# Patient Record
Sex: FEMALE | Race: WHITE | Employment: OTHER | URBAN - METROPOLITAN AREA
[De-identification: names, ages, dates, MRNs, and addresses within clinical notes are randomized per-mention and may not be internally consistent; named-entity substitution may affect disease eponyms.]

---

## 2017-10-20 ENCOUNTER — HOSPITAL ENCOUNTER (OUTPATIENT)
Dept: MAMMOGRAPHY | Age: 82
Discharge: HOME | End: 2017-10-20
Attending: SURGERY | Admitting: SURGERY

## 2017-10-20 DIAGNOSIS — Z12.31 ENCOUNTER FOR SCREENING MAMMOGRAM FOR BREAST CANCER: ICD-10-CM

## 2017-10-20 DIAGNOSIS — Z85.3 PERSONAL HISTORY OF MALIGNANT NEOPLASM OF BREAST: ICD-10-CM

## 2018-10-30 ENCOUNTER — HOSPITAL ENCOUNTER (OUTPATIENT)
Dept: PET IMAGING | Age: 83
Discharge: HOME | End: 2018-10-30
Payer: MEDICARE

## 2018-10-30 ENCOUNTER — HOSPITAL ENCOUNTER (OUTPATIENT)
Age: 83
Discharge: HOME | End: 2018-10-30
Payer: MEDICARE

## 2018-10-30 VITALS — HEIGHT: 62 IN | WEIGHT: 152 LBS | BODY MASS INDEX: 27.97 KG/M2

## 2018-10-30 DIAGNOSIS — C50.312 MALIGNANT NEOPLASM OF LOWER-INNER QUADRANT OF LEFT FEMALE BREAST, UNSPECIFIED ESTROGEN RECEPTOR STATUS (HCC): ICD-10-CM

## 2018-10-30 PROCEDURE — 3430000000 HC RX DIAGNOSTIC RADIOPHARMACEUTICAL: Performed by: INTERNAL MEDICINE

## 2018-10-30 PROCEDURE — 78815 PET IMAGE W/CT SKULL-THIGH: CPT

## 2018-10-30 PROCEDURE — A9552 F18 FDG: HCPCS | Performed by: INTERNAL MEDICINE

## 2018-10-30 RX ORDER — FLUDEOXYGLUCOSE F 18 200 MCI/ML
14.51 INJECTION, SOLUTION INTRAVENOUS
Status: COMPLETED | OUTPATIENT
Start: 2018-10-30 | End: 2018-10-30

## 2018-10-30 RX ADMIN — FLUDEOXYGLUCOSE F 18 14.51 MILLICURIE: 200 INJECTION, SOLUTION INTRAVENOUS at 08:10

## 2019-02-19 ENCOUNTER — HOSPITAL ENCOUNTER (OUTPATIENT)
Dept: PET IMAGING | Age: 84
Discharge: HOME | End: 2019-02-19
Payer: MEDICARE

## 2019-02-19 VITALS — WEIGHT: 150 LBS | HEIGHT: 62 IN | BODY MASS INDEX: 27.6 KG/M2

## 2019-02-19 DIAGNOSIS — C50.312 MALIGNANT NEOPLASM OF LOWER-INNER QUADRANT OF LEFT FEMALE BREAST, UNSPECIFIED ESTROGEN RECEPTOR STATUS (HCC): ICD-10-CM

## 2019-02-19 PROCEDURE — 3430000000 HC RX DIAGNOSTIC RADIOPHARMACEUTICAL: Performed by: INTERNAL MEDICINE

## 2019-02-19 PROCEDURE — A9552 F18 FDG: HCPCS | Performed by: INTERNAL MEDICINE

## 2019-02-19 PROCEDURE — 78815 PET IMAGE W/CT SKULL-THIGH: CPT

## 2019-02-19 RX ORDER — FLUDEOXYGLUCOSE F 18 200 MCI/ML
15.46 INJECTION, SOLUTION INTRAVENOUS
Status: COMPLETED | OUTPATIENT
Start: 2019-02-19 | End: 2019-02-19

## 2019-02-19 RX ADMIN — FLUDEOXYGLUCOSE F 18 15.46 MILLICURIE: 200 INJECTION, SOLUTION INTRAVENOUS at 10:04

## 2019-07-23 ENCOUNTER — HOSPITAL ENCOUNTER (OUTPATIENT)
Dept: PET IMAGING | Age: 84
Discharge: HOME | End: 2019-07-23
Payer: MEDICARE

## 2019-07-23 VITALS — WEIGHT: 147 LBS | HEIGHT: 62 IN | BODY MASS INDEX: 27.05 KG/M2

## 2019-07-23 DIAGNOSIS — C50.312 MALIGNANT NEOPLASM OF LOWER-INNER QUADRANT OF LEFT FEMALE BREAST, UNSPECIFIED ESTROGEN RECEPTOR STATUS (HCC): ICD-10-CM

## 2019-07-23 PROCEDURE — 3430000000 HC RX DIAGNOSTIC RADIOPHARMACEUTICAL: Performed by: INTERNAL MEDICINE

## 2019-07-23 PROCEDURE — 78815 PET IMAGE W/CT SKULL-THIGH: CPT

## 2019-07-23 PROCEDURE — A9552 F18 FDG: HCPCS | Performed by: INTERNAL MEDICINE

## 2019-07-23 RX ORDER — FLUDEOXYGLUCOSE F 18 200 MCI/ML
14.37 INJECTION, SOLUTION INTRAVENOUS
Status: COMPLETED | OUTPATIENT
Start: 2019-07-23 | End: 2019-07-23

## 2019-07-23 RX ADMIN — FLUDEOXYGLUCOSE F 18 14.37 MILLICURIE: 200 INJECTION, SOLUTION INTRAVENOUS at 10:01

## 2020-01-31 ENCOUNTER — HOSPITAL ENCOUNTER (OUTPATIENT)
Dept: PET IMAGING | Age: 85
Discharge: HOME | End: 2020-01-31
Payer: MEDICARE

## 2020-01-31 VITALS — WEIGHT: 152 LBS | BODY MASS INDEX: 27.97 KG/M2 | HEIGHT: 62 IN

## 2020-01-31 PROCEDURE — A9552 F18 FDG: HCPCS | Performed by: INTERNAL MEDICINE

## 2020-01-31 PROCEDURE — 78815 PET IMAGE W/CT SKULL-THIGH: CPT

## 2020-01-31 PROCEDURE — 3430000000 HC RX DIAGNOSTIC RADIOPHARMACEUTICAL: Performed by: INTERNAL MEDICINE

## 2020-01-31 RX ORDER — FLUDEOXYGLUCOSE F 18 200 MCI/ML
15.6 INJECTION, SOLUTION INTRAVENOUS
Status: COMPLETED | OUTPATIENT
Start: 2020-01-31 | End: 2020-01-31

## 2020-01-31 RX ADMIN — FLUDEOXYGLUCOSE F 18 15.6 MILLICURIE: 200 INJECTION, SOLUTION INTRAVENOUS at 08:45

## 2020-06-26 ENCOUNTER — HOSPITAL ENCOUNTER (OUTPATIENT)
Dept: PET IMAGING | Age: 85
Discharge: HOME | End: 2020-06-26
Payer: MEDICARE

## 2020-06-26 VITALS — BODY MASS INDEX: 28.52 KG/M2 | WEIGHT: 155 LBS | HEIGHT: 62 IN

## 2020-06-26 PROCEDURE — 3430000000 HC RX DIAGNOSTIC RADIOPHARMACEUTICAL: Performed by: INTERNAL MEDICINE

## 2020-06-26 PROCEDURE — 78815 PET IMAGE W/CT SKULL-THIGH: CPT

## 2020-06-26 PROCEDURE — A9552 F18 FDG: HCPCS | Performed by: INTERNAL MEDICINE

## 2020-06-26 RX ORDER — FLUDEOXYGLUCOSE F 18 200 MCI/ML
13.3 INJECTION, SOLUTION INTRAVENOUS
Status: COMPLETED | OUTPATIENT
Start: 2020-06-26 | End: 2020-06-26

## 2020-06-26 RX ADMIN — FLUDEOXYGLUCOSE F 18 13.3 MILLICURIE: 200 INJECTION, SOLUTION INTRAVENOUS at 10:57

## 2020-11-13 ENCOUNTER — HOSPITAL ENCOUNTER (OUTPATIENT)
Dept: PET IMAGING | Age: 85
Discharge: HOME | End: 2020-11-13
Payer: MEDICARE

## 2020-11-13 VITALS — BODY MASS INDEX: 22.45 KG/M2 | HEIGHT: 62 IN | WEIGHT: 122 LBS

## 2020-11-13 PROCEDURE — 3430000000 HC RX DIAGNOSTIC RADIOPHARMACEUTICAL: Performed by: INTERNAL MEDICINE

## 2020-11-13 PROCEDURE — A9552 F18 FDG: HCPCS | Performed by: INTERNAL MEDICINE

## 2020-11-13 PROCEDURE — 78815 PET IMAGE W/CT SKULL-THIGH: CPT

## 2020-11-13 RX ORDER — FLUDEOXYGLUCOSE F 18 200 MCI/ML
14.25 INJECTION, SOLUTION INTRAVENOUS
Status: COMPLETED | OUTPATIENT
Start: 2020-11-13 | End: 2020-11-13

## 2020-11-13 RX ADMIN — FLUDEOXYGLUCOSE F 18 14.25 MILLICURIE: 200 INJECTION, SOLUTION INTRAVENOUS at 11:52

## 2021-03-05 ENCOUNTER — HOSPITAL ENCOUNTER (OUTPATIENT)
Dept: PET IMAGING | Age: 86
Discharge: HOME | End: 2021-03-05
Payer: MEDICARE

## 2021-03-05 VITALS — HEIGHT: 62 IN | BODY MASS INDEX: 28.16 KG/M2 | WEIGHT: 153 LBS

## 2021-03-05 DIAGNOSIS — C50.312 MALIGNANT NEOPLASM OF LOWER-INNER QUADRANT OF LEFT FEMALE BREAST, UNSPECIFIED ESTROGEN RECEPTOR STATUS (HCC): ICD-10-CM

## 2021-03-05 PROCEDURE — 3430000000 HC RX DIAGNOSTIC RADIOPHARMACEUTICAL: Performed by: INTERNAL MEDICINE

## 2021-03-05 PROCEDURE — A9552 F18 FDG: HCPCS | Performed by: INTERNAL MEDICINE

## 2021-03-05 PROCEDURE — 78815 PET IMAGE W/CT SKULL-THIGH: CPT

## 2021-03-05 RX ORDER — FLUDEOXYGLUCOSE F 18 200 MCI/ML
12.77 INJECTION, SOLUTION INTRAVENOUS
Status: COMPLETED | OUTPATIENT
Start: 2021-03-05 | End: 2021-03-05

## 2021-03-05 RX ADMIN — FLUDEOXYGLUCOSE F 18 12.77 MILLICURIE: 200 INJECTION, SOLUTION INTRAVENOUS at 11:33

## 2021-05-13 ENCOUNTER — APPOINTMENT (OUTPATIENT)
Dept: GENERAL RADIOLOGY | Age: 86
DRG: 194 | End: 2021-05-13
Payer: MEDICARE

## 2021-05-13 ENCOUNTER — HOSPITAL ENCOUNTER (INPATIENT)
Age: 86
LOS: 2 days | Discharge: HOME | DRG: 194 | End: 2021-05-15
Attending: EMERGENCY MEDICINE | Admitting: HOSPITALIST
Payer: MEDICARE

## 2021-05-13 DIAGNOSIS — I50.9 ACUTE ON CHRONIC CONGESTIVE HEART FAILURE, UNSPECIFIED HEART FAILURE TYPE (HCC): Primary | ICD-10-CM

## 2021-05-13 DIAGNOSIS — J18.9 COMMUNITY ACQUIRED PNEUMONIA, UNSPECIFIED LATERALITY: ICD-10-CM

## 2021-05-13 LAB
A/G RATIO: 1.1 (ref 1.1–2.2)
ALBUMIN SERPL-MCNC: 3.7 G/DL (ref 3.4–5)
ALP BLD-CCNC: 96 U/L (ref 40–129)
ALT SERPL-CCNC: 18 U/L (ref 10–40)
ANION GAP SERPL CALCULATED.3IONS-SCNC: 13 MMOL/L (ref 3–16)
AST SERPL-CCNC: 29 U/L (ref 15–37)
BASOPHILS ABSOLUTE: 0.1 K/UL (ref 0–0.2)
BASOPHILS RELATIVE PERCENT: 2.8 %
BILIRUB SERPL-MCNC: 0.4 MG/DL (ref 0–1)
BILIRUBIN URINE: NEGATIVE
BLOOD, URINE: NEGATIVE
BUN BLDV-MCNC: 33 MG/DL (ref 7–20)
CALCIUM SERPL-MCNC: 8.8 MG/DL (ref 8.3–10.6)
CHLORIDE BLD-SCNC: 94 MMOL/L (ref 99–110)
CLARITY: CLEAR
CO2: 22 MMOL/L (ref 21–32)
COLOR: YELLOW
CREAT SERPL-MCNC: 1.9 MG/DL (ref 0.6–1.2)
EKG ATRIAL RATE: 68 BPM
EKG DIAGNOSIS: NORMAL
EKG P AXIS: 95 DEGREES
EKG P-R INTERVAL: 202 MS
EKG Q-T INTERVAL: 460 MS
EKG QRS DURATION: 102 MS
EKG QTC CALCULATION (BAZETT): 489 MS
EKG R AXIS: -46 DEGREES
EKG T AXIS: 75 DEGREES
EKG VENTRICULAR RATE: 68 BPM
EOSINOPHILS ABSOLUTE: 0.1 K/UL (ref 0–0.6)
EOSINOPHILS RELATIVE PERCENT: 2.6 %
GFR AFRICAN AMERICAN: 30
GFR NON-AFRICAN AMERICAN: 25
GLOBULIN: 3.3 G/DL
GLUCOSE BLD-MCNC: 110 MG/DL (ref 70–99)
GLUCOSE URINE: NEGATIVE MG/DL
HCT VFR BLD CALC: 29.5 % (ref 36–48)
HEMOGLOBIN: 10.2 G/DL (ref 12–16)
KETONES, URINE: NEGATIVE MG/DL
LEUKOCYTE ESTERASE, URINE: NEGATIVE
LIPASE: 31 U/L (ref 13–60)
LYMPHOCYTES ABSOLUTE: 0.6 K/UL (ref 1–5.1)
LYMPHOCYTES RELATIVE PERCENT: 25.8 %
MCH RBC QN AUTO: 31.7 PG (ref 26–34)
MCHC RBC AUTO-ENTMCNC: 34.4 G/DL (ref 31–36)
MCV RBC AUTO: 92.1 FL (ref 80–100)
MICROSCOPIC EXAMINATION: NORMAL
MONOCYTES ABSOLUTE: 0.2 K/UL (ref 0–1.3)
MONOCYTES RELATIVE PERCENT: 9.9 %
NEUTROPHILS ABSOLUTE: 1.5 K/UL (ref 1.7–7.7)
NEUTROPHILS RELATIVE PERCENT: 58.9 %
NITRITE, URINE: NEGATIVE
PDW BLD-RTO: 18.5 % (ref 12.4–15.4)
PH UA: 6 (ref 5–8)
PLATELET # BLD: 180 K/UL (ref 135–450)
PMV BLD AUTO: 7.3 FL (ref 5–10.5)
POTASSIUM SERPL-SCNC: 4.7 MMOL/L (ref 3.5–5.1)
PRO-BNP: 2199 PG/ML (ref 0–449)
PROCALCITONIN: 0.36 NG/ML (ref 0–0.15)
PROTEIN UA: NEGATIVE MG/DL
RBC # BLD: 3.21 M/UL (ref 4–5.2)
SARS-COV-2, NAAT: NOT DETECTED
SODIUM BLD-SCNC: 129 MMOL/L (ref 136–145)
SPECIFIC GRAVITY UA: 1.01 (ref 1–1.03)
TOTAL PROTEIN: 7 G/DL (ref 6.4–8.2)
TROPONIN: 0.04 NG/ML
TROPONIN: 0.04 NG/ML
TROPONIN: 0.05 NG/ML
URINE TYPE: NORMAL
UROBILINOGEN, URINE: 0.2 E.U./DL
WBC # BLD: 2.5 K/UL (ref 4–11)

## 2021-05-13 PROCEDURE — 85025 COMPLETE CBC W/AUTO DIFF WBC: CPT

## 2021-05-13 PROCEDURE — 71045 X-RAY EXAM CHEST 1 VIEW: CPT

## 2021-05-13 PROCEDURE — 2580000003 HC RX 258: Performed by: EMERGENCY MEDICINE

## 2021-05-13 PROCEDURE — 87635 SARS-COV-2 COVID-19 AMP PRB: CPT

## 2021-05-13 PROCEDURE — 2580000003 HC RX 258: Performed by: HOSPITALIST

## 2021-05-13 PROCEDURE — 99285 EMERGENCY DEPT VISIT HI MDM: CPT

## 2021-05-13 PROCEDURE — 93010 ELECTROCARDIOGRAM REPORT: CPT | Performed by: INTERNAL MEDICINE

## 2021-05-13 PROCEDURE — 84484 ASSAY OF TROPONIN QUANT: CPT

## 2021-05-13 PROCEDURE — 6360000002 HC RX W HCPCS: Performed by: EMERGENCY MEDICINE

## 2021-05-13 PROCEDURE — 80053 COMPREHEN METABOLIC PANEL: CPT

## 2021-05-13 PROCEDURE — 36415 COLL VENOUS BLD VENIPUNCTURE: CPT

## 2021-05-13 PROCEDURE — 93005 ELECTROCARDIOGRAM TRACING: CPT | Performed by: EMERGENCY MEDICINE

## 2021-05-13 PROCEDURE — 1200000000 HC SEMI PRIVATE

## 2021-05-13 PROCEDURE — 6370000000 HC RX 637 (ALT 250 FOR IP): Performed by: EMERGENCY MEDICINE

## 2021-05-13 PROCEDURE — 6370000000 HC RX 637 (ALT 250 FOR IP): Performed by: HOSPITALIST

## 2021-05-13 PROCEDURE — 83690 ASSAY OF LIPASE: CPT

## 2021-05-13 PROCEDURE — 87040 BLOOD CULTURE FOR BACTERIA: CPT

## 2021-05-13 PROCEDURE — 83880 ASSAY OF NATRIURETIC PEPTIDE: CPT

## 2021-05-13 PROCEDURE — 96374 THER/PROPH/DIAG INJ IV PUSH: CPT

## 2021-05-13 PROCEDURE — 84145 PROCALCITONIN (PCT): CPT

## 2021-05-13 PROCEDURE — 81003 URINALYSIS AUTO W/O SCOPE: CPT

## 2021-05-13 RX ORDER — ACETAMINOPHEN 325 MG/1
650 TABLET ORAL EVERY 6 HOURS PRN
Status: DISCONTINUED | OUTPATIENT
Start: 2021-05-13 | End: 2021-05-15 | Stop reason: HOSPADM

## 2021-05-13 RX ORDER — SODIUM CHLORIDE 9 MG/ML
INJECTION, SOLUTION INTRAVENOUS CONTINUOUS
Status: DISCONTINUED | OUTPATIENT
Start: 2021-05-13 | End: 2021-05-15 | Stop reason: HOSPADM

## 2021-05-13 RX ORDER — SODIUM CHLORIDE 0.9 % (FLUSH) 0.9 %
5-40 SYRINGE (ML) INJECTION PRN
Status: DISCONTINUED | OUTPATIENT
Start: 2021-05-13 | End: 2021-05-15 | Stop reason: HOSPADM

## 2021-05-13 RX ORDER — ACETAMINOPHEN 325 MG/1
650 TABLET ORAL ONCE
Status: COMPLETED | OUTPATIENT
Start: 2021-05-13 | End: 2021-05-13

## 2021-05-13 RX ORDER — LEVOTHYROXINE SODIUM 112 UG/1
112 TABLET ORAL DAILY
Status: DISCONTINUED | OUTPATIENT
Start: 2021-05-14 | End: 2021-05-15 | Stop reason: HOSPADM

## 2021-05-13 RX ORDER — CLOPIDOGREL BISULFATE 75 MG/1
75 TABLET ORAL DAILY
Status: ON HOLD | COMMUNITY
Start: 2020-09-28 | End: 2021-05-25 | Stop reason: HOSPADM

## 2021-05-13 RX ORDER — FUROSEMIDE 10 MG/ML
40 INJECTION INTRAMUSCULAR; INTRAVENOUS ONCE
Status: COMPLETED | OUTPATIENT
Start: 2021-05-13 | End: 2021-05-13

## 2021-05-13 RX ORDER — POLYETHYLENE GLYCOL 3350 17 G/17G
17 POWDER, FOR SOLUTION ORAL DAILY PRN
Status: DISCONTINUED | OUTPATIENT
Start: 2021-05-13 | End: 2021-05-15 | Stop reason: HOSPADM

## 2021-05-13 RX ORDER — FUROSEMIDE 20 MG/1
20 TABLET ORAL DAILY
COMMUNITY
Start: 2020-10-21

## 2021-05-13 RX ORDER — PROMETHAZINE HYDROCHLORIDE 25 MG/1
12.5 TABLET ORAL EVERY 6 HOURS PRN
Status: DISCONTINUED | OUTPATIENT
Start: 2021-05-13 | End: 2021-05-15 | Stop reason: HOSPADM

## 2021-05-13 RX ORDER — LISINOPRIL 20 MG/1
20 TABLET ORAL DAILY
Status: ON HOLD | COMMUNITY
Start: 2021-03-15 | End: 2021-05-15 | Stop reason: HOSPADM

## 2021-05-13 RX ORDER — PRAVASTATIN SODIUM 40 MG
40 TABLET ORAL NIGHTLY
Status: DISCONTINUED | OUTPATIENT
Start: 2021-05-13 | End: 2021-05-15 | Stop reason: HOSPADM

## 2021-05-13 RX ORDER — SODIUM CHLORIDE 0.9 % (FLUSH) 0.9 %
5-40 SYRINGE (ML) INJECTION EVERY 12 HOURS SCHEDULED
Status: DISCONTINUED | OUTPATIENT
Start: 2021-05-13 | End: 2021-05-15 | Stop reason: HOSPADM

## 2021-05-13 RX ORDER — ASPIRIN 325 MG
325 TABLET ORAL ONCE
Status: COMPLETED | OUTPATIENT
Start: 2021-05-13 | End: 2021-05-13

## 2021-05-13 RX ORDER — LORATADINE 10 MG/1
10 TABLET ORAL DAILY
Status: ON HOLD | COMMUNITY
End: 2021-05-14 | Stop reason: CLARIF

## 2021-05-13 RX ORDER — ONDANSETRON 2 MG/ML
4 INJECTION INTRAMUSCULAR; INTRAVENOUS EVERY 6 HOURS PRN
Status: DISCONTINUED | OUTPATIENT
Start: 2021-05-13 | End: 2021-05-15 | Stop reason: HOSPADM

## 2021-05-13 RX ORDER — CIPROFLOXACIN 250 MG/1
TABLET, FILM COATED ORAL
Status: ON HOLD | COMMUNITY
Start: 2021-05-10 | End: 2021-05-14 | Stop reason: CLARIF

## 2021-05-13 RX ORDER — DILTIAZEM HYDROCHLORIDE 300 MG/1
300 CAPSULE, EXTENDED RELEASE ORAL DAILY
COMMUNITY
Start: 2020-09-28

## 2021-05-13 RX ORDER — ROPINIROLE 0.25 MG/1
0.25 TABLET, FILM COATED ORAL NIGHTLY
COMMUNITY
Start: 2021-04-16

## 2021-05-13 RX ORDER — ASPIRIN 81 MG/1
81 TABLET ORAL DAILY
Status: DISCONTINUED | OUTPATIENT
Start: 2021-05-13 | End: 2021-05-15 | Stop reason: HOSPADM

## 2021-05-13 RX ORDER — SODIUM CHLORIDE 9 MG/ML
25 INJECTION, SOLUTION INTRAVENOUS PRN
Status: DISCONTINUED | OUTPATIENT
Start: 2021-05-13 | End: 2021-05-15 | Stop reason: HOSPADM

## 2021-05-13 RX ORDER — TERAZOSIN 2 MG/1
2 CAPSULE ORAL DAILY
COMMUNITY
Start: 2021-05-10

## 2021-05-13 RX ORDER — ACETAMINOPHEN 650 MG/1
650 SUPPOSITORY RECTAL EVERY 6 HOURS PRN
Status: DISCONTINUED | OUTPATIENT
Start: 2021-05-13 | End: 2021-05-15 | Stop reason: HOSPADM

## 2021-05-13 RX ADMIN — AZITHROMYCIN MONOHYDRATE 500 MG: 500 INJECTION, POWDER, LYOPHILIZED, FOR SOLUTION INTRAVENOUS at 14:13

## 2021-05-13 RX ADMIN — FUROSEMIDE 40 MG: 10 INJECTION, SOLUTION INTRAMUSCULAR; INTRAVENOUS at 12:40

## 2021-05-13 RX ADMIN — ASPIRIN 325 MG ORAL TABLET 325 MG: 325 PILL ORAL at 12:37

## 2021-05-13 RX ADMIN — CEFTRIAXONE SODIUM 1000 MG: 1 INJECTION, POWDER, FOR SOLUTION INTRAMUSCULAR; INTRAVENOUS at 13:24

## 2021-05-13 RX ADMIN — PRAVASTATIN SODIUM 40 MG: 40 TABLET ORAL at 19:54

## 2021-05-13 RX ADMIN — SODIUM CHLORIDE: 9 INJECTION, SOLUTION INTRAVENOUS at 15:53

## 2021-05-13 RX ADMIN — METOPROLOL TARTRATE 25 MG: 25 TABLET, FILM COATED ORAL at 19:53

## 2021-05-13 RX ADMIN — ACETAMINOPHEN 650 MG: 325 TABLET ORAL at 11:53

## 2021-05-13 RX ADMIN — ACETAMINOPHEN 650 MG: 325 TABLET ORAL at 19:53

## 2021-05-13 ASSESSMENT — PAIN DESCRIPTION - LOCATION: LOCATION: ABDOMEN

## 2021-05-13 ASSESSMENT — PAIN SCALES - GENERAL
PAINLEVEL_OUTOF10: 5
PAINLEVEL_OUTOF10: 5

## 2021-05-13 ASSESSMENT — PAIN DESCRIPTION - PAIN TYPE: TYPE: ACUTE PAIN

## 2021-05-13 ASSESSMENT — PAIN DESCRIPTION - ORIENTATION
ORIENTATION: LEFT;UPPER
ORIENTATION: MID

## 2021-05-13 NOTE — PROGRESS NOTES
PS sent to MD:        RE: Alexander Stanton 01/21/1926 RM: 215 Patient takes Ibrance or (palbociclib) 75mg for her cancer treatment. Patient brought home supply. Can you place order for patient so pharmacy can dose it? Thank you. Need Callback: NO CALLBACK REQ A2 HEART FAILURE ROUTINE    Addendum: Hold for now due to infection. Medication placed in patient's lock box.

## 2021-05-13 NOTE — PROGRESS NOTES
Patient arrived to unit via stretcher in stable condition with all belongings. VSS. Admission assessment completed as charted. Patient oriented to room, call light, bed, phone, lights, bathroom, and nurse. Patient educated on the daily schedule including vitals, lab draws, assessments, possible tests, schedule of MD rounding, daily weights and I's & O's. Patient instructed on current diet, how to use 8MENU, and TV. Patient verbalizes pain as 0 on 0-10 scale. Patient denies any other needs at this time. Bed is in lowest locked position, call light is within reach, and side rails up 2/4. Will continue to monitor patient for pain, discomfort or any other needs.

## 2021-05-13 NOTE — ED NOTES
Report given to Neris Wall, floor nurse.  Patient left unit with all belongings including but not limited to; shoes, clothing       Jamaica León RN  05/13/21 2894

## 2021-05-13 NOTE — H&P
Hospital Medicine History & Physical      PCP: Jono Vasques MD    Date of Admission: 5/13/2021    Date of Service: Pt seen/examined on 5/13/21 and Admitted to Inpatient with expected LOS greater than two midnights due to medical therapy. Chief Complaint: Pain underneath her lower rib cage on both sides      History Of Present Illness:     80 y.o. femalewith history of recurrent stage III left breast cancer on chemotherapy, hypertension, CKD stage III, hypothyroidism, dementia presented to emergency room with complaints of pain underneath her lower rib cage and radiating to the back for the past 1 day. . She also had a small dry cough a few days ago with shortness of breath and subjective fevers. .. No URI symptoms, nausea vomiting or diarrhea. .  She is fully vaccinated for Covid-19. .  In the emergency room, she was afebrile with no leukocytosis. . Chest x-ray showed bibasilar infiltrates/atelectasis. . Recent leukopenia of 2.4k, troponin 0 0.05. Bettey Grad EKG showed no acute ischemic changes. rapid covid test was neg  She was given IV Rocephin and azithromycin for presumed pneumonia    Past Medical History:          Diagnosis Date    Cancer [facility])     breast    Cataract     High blood pressure     Thyroid disorder        Past Surgical History:          Procedure Laterality Date    BLADDER SUSPENSION  2000    BREAST LUMPECTOMY Left 08/21/15    CATARACT REMOVAL      CHOLECYSTECTOMY      HYSTERECTOMY         Medications Prior to Admission:      Prior to Admission medications    Medication Sig Start Date End Date Taking?  Authorizing Provider   clopidogrel (PLAVIX) 75 MG tablet Take 75 mg by mouth daily 9/28/20  Yes Historical Provider, MD   dilTIAZem (TIAZAC) 300 MG extended release capsule Take 300 mg by mouth daily 9/28/20  Yes Historical Provider, MD   furosemide (LASIX) 20 MG tablet Take 20 mg by mouth daily 10/21/20  Yes Historical Provider, MD   lisinopril (PRINIVIL;ZESTRIL) 20 MG tablet 40 mg  3/15/21 Resp 18   Ht 5' 2\" (1.575 m)   Wt 153 lb 12.8 oz (69.8 kg)   SpO2 97%   BMI 28.13 kg/m²     General appearance:  No apparent distress, appears stated age and cooperative. HEENT:  Normal cephalic, atraumatic without obvious deformity. Pupils equal, round, and reactive to light. Extra ocular muscles intact. Conjunctivae/corneas clear. Neck: Supple, with full range of motion. No jugular venous distention. Trachea midline. Respiratory:  Normal respiratory effort. Clear to auscultation, bilaterally without Rales/Wheezes/Rhonchi. Cardiovascular:  Regular rate and rhythm with normal S1/S2 without murmurs, rubs or gallops. Abdomen: Soft, non-tender, non-distended with normal bowel sounds. Musculoskeletal:  No clubbing, cyanosis or edema bilaterally. Full range of motion without deformity. Skin: Skin color, texture, turgor normal.  No rashes or lesions. Neurologic:  Neurovascularly intact without any focal sensory/motor deficits. Cranial nerves: II-XII intact, grossly non-focal.  Psychiatric:  Alert and oriented, thought content appropriate, normal insight  Capillary Refill: Brisk,< 3 seconds   Peripheral Pulses: +2 palpable, equal bilaterally       CXR:  I have reviewed the CXR with the following interpretation:   EKG:  I have reviewed the EKG with the following interpretation:     Labs:     Recent Labs     05/13/21  1135   WBC 2.5*   HGB 10.2*   HCT 29.5*        Recent Labs     05/13/21  1135   *   K 4.7   CL 94*   CO2 22   BUN 33*   CREATININE 1.9*   CALCIUM 8.8     Recent Labs     05/13/21  1135   AST 29   ALT 18   BILITOT 0.4   ALKPHOS 96     No results for input(s): INR in the last 72 hours.   Recent Labs     05/13/21  1135   TROPONINI 0.05*       Urinalysis:      Lab Results   Component Value Date    NITRU Negative 05/13/2021    BLOODU Negative 05/13/2021    SPECGRAV 1.015 05/13/2021    GLUCOSEU Negative 05/13/2021         ASSESSMENT:      -Bacterial pneumonia/CAP--bibasilar infiltrates on chest x-ray, mildly elevated procalcitonin, chest pain with mild cough , SLB and subjective fevers. .rapid covid is neg. .. Continue IV Rocephin and azithromycin. . Follow-up on culture data    - JIMMIE on CKD stage III--hold diuretic and lisinopril therapy for now. start IV fluids cautiously. closely monitor renal function avoid nephrotoxic medications . Florentin Dupont Creatinine is 1.9. Florentin Dupont Last creatinine was 1.74 in care everywhere 4/19/21-unclear what the baseline is    -Hyponatremiahypovolemic,Na 129hold diuretics and start IV fluids--closely monitor sodium levels    -Mild troponin elevation in the setting of JIMMIE-doubt ACS--will cycle troponins , obtain echocardiogram      -Stage III left breast cancer, recurrent--on palbociclib 75 mg and monthly fulvestrant and trastuzumab    Follows with oncologist at [facility]    -Chemotherapy-induced anemia and neutropenia--ANC greater than 1000-similar to outpatient lab--follows with oncology--continue to monitor    -Alzheimer's dementia--mild-continue supportive care      -Recent UTI--completing 7-day course of Cipro today    -Essential hypertensionstablecontinue metoprolol. Hold HCTZ    -Osteoporosison Fosamax    -Hypothyroidismcontinue Synthroid    -Hyperlipidemiacontinue statin        DVT Prophylaxis: Lovenox  Diet: DIET CARDIAC;  Code Status: DNR-CC           Humberto Chapa MD    Thank you Narda Litten, MD for the opportunity to be involved in this patient's care. If you have any questions or concerns please feel free to contact me at [phone].

## 2021-05-13 NOTE — ED NOTES
Bed: 21  Expected date:   Expected time:   Means of arrival:   Comments:  Cyn Carter RN  05/13/21 1124

## 2021-05-13 NOTE — PROGRESS NOTES
4 Eyes Skin Assessment     The patient is being assess for  Admission    I agree that 2 RN's have performed a thorough Head to Toe Skin Assessment on the patient. ALL assessment sites listed below have been assessed. Areas assessed by both nurses: America/Lidia  [x]   Head, Face, and Ears   [x]   Shoulders, Back, and Chest  [x]   Arms, Elbows, and Hands   [x]   Coccyx, Sacrum, and Ischum  [x]   Legs, Feet, and Heels        Does the Patient have Skin Breakdown?   No         Herbie Prevention initiated:  No   Wound Care Orders initiated:  No      [facility] nurse consulted for Pressure Injury (Stage 3,4, Unstageable, DTI, NWPT, and Complex wounds):  No      Nurse 1 eSignature: Electronically signed by Beth Echevarria RN on 5/13/21 at 3:30 PM EDT    **SHARE this note so that the co-signing nurse is able to place an eSignature**    Nurse 2 eSignature: Electronically signed by Hien Peace RN on 5/13/21 at 4:04 PM EDT

## 2021-05-13 NOTE — ED NOTES
Patient identified as a positive fall risk on the ED triage fall screening. Patient placed in fall precautions which includes:  yellow fall risk bracelet on wrist, nonskid shoes on feet, \"Be Safe\" sign placed on patient's door, and bed alarm placed under patient/alarm turned on. Patient instructed on importance of not getting out of bed or ambulating without assistance for safety.           Franky De Oliveira RN  05/13/21 1348

## 2021-05-13 NOTE — ED PROVIDER NOTES
7 days    ASPIRIN 81 MG TABLET    Take 81 mg by mouth daily    B COMPLEX VITAMINS (VITAMIN B COMPLEX PO)    Take  by mouth. CALCIUM CARBONATE (CALTRATE 600 PO)    Take  by mouth. CHOLECALCIFEROL (VITAMIN D PO)    Take  by mouth. HYDROCHLOROTHIAZIDE PO    Take by mouth    LEVOTHYROXINE (SYNTHROID) 88 MCG TABLET        METOPROLOL (LOPRESSOR) 25 MG TABLET        PRAVASTATIN (PRAVACHOL) 40 MG TABLET        TRAMADOL (ULTRAM) 50 MG TABLET           ALLERGIES     Codeine    FAMILY HISTORY       Family History   Problem Relation Age of Onset    Heart Disease Mother     High Blood Pressure Mother     High Blood Pressure Father     Stroke Father     Rheum Arthritis Sister     Cancer Sister           SOCIAL HISTORY       Social History     Socioeconomic History    Marital status:       Spouse name: None    Number of children: None    Years of education: None    Highest education level: None   Occupational History    None   Social Needs    Financial resource strain: None    Food insecurity     Worry: None     Inability: None    Transportation needs     Medical: None     Non-medical: None   Tobacco Use    Smoking status: Former Smoker     Quit date: 1989     Years since quittin.3    Smokeless tobacco: Never Used   Substance and Sexual Activity    Alcohol use: No     Alcohol/week: 0.0 standard drinks     Comment: 1 beer every other week    Drug use: No    Sexual activity: None   Lifestyle    Physical activity     Days per week: None     Minutes per session: None    Stress: None   Relationships    Social connections     Talks on phone: None     Gets together: None     Attends Yarsanism service: None     Active member of club or organization: None     Attends meetings of clubs or organizations: None     Relationship status: None    Intimate partner violence     Fear of current or ex partner: None     Emotionally abused: None     Physically abused: None     Forced sexual activity: None Other Topics Concern    None   Social History Narrative    None       SCREENINGS    Crystal Coma Scale  Eye Opening: Spontaneous  Best Verbal Response: Oriented  Best Motor Response: Obeys commands  Crystal Coma Scale Score: 15          PHYSICAL EXAM    (up to 7 for level 4, 8 or more for level 5)     ED Triage Vitals [05/13/21 1128]   BP Temp Temp Source Pulse Resp SpO2 Height Weight   (!) 174/59 97.8 °F (36.6 °C) Oral 64 18 94 % -- 153 lb (69.4 kg)       Physical Exam    General appearance:  Cooperative. No acute distress. Skin:  Warm. Dry. Eye:  Extraocular movements intact. Ears, nose, mouth and throat:  Oral mucosa moist,  Neck:  Trachea midline. Heart:  Regular rate and rhythm  Perfusion:  intact  Respiratory:  Lungs clear to auscultation bilaterally. Respirations nonlabored. Abdominal:   Non distended. Nontender  Neurological:  Alert and oriented x 3. Moves all extremities spontaneously  Musculoskeletal:   Normal ROM, no deformities.   Bilateral lower extremity edema          Psychiatric:  Normal mood      DIAGNOSTIC RESULTS       Labs Reviewed   CBC WITH AUTO DIFFERENTIAL - Abnormal; Notable for the following components:       Result Value    WBC 2.5 (*)     RBC 3.21 (*)     Hemoglobin 10.2 (*)     Hematocrit 29.5 (*)     RDW 18.5 (*)     Neutrophils Absolute 1.5 (*)     Lymphocytes Absolute 0.6 (*)     All other components within normal limits    Narrative:     Performed at:  [address]   Phone [phone]   COMPREHENSIVE METABOLIC PANEL - Abnormal; Notable for the following components:    Sodium 129 (*)     Chloride 94 (*)     Glucose 110 (*)     BUN 33 (*)     CREATININE 1.9 (*)     GFR Non- 25 (*)     GFR  30 (*)     All other components within normal limits    Narrative:     Performed at:  [facility]) - [address] Phone [phone] - Abnormal; Notable for the following components:    Pro-BNP 2,199 (*)     All other components within normal limits    Narrative:     Performed at:  [address], [facility] "[employer]"   Phone [phone]   TROPONIN - Abnormal; Notable for the following components:    Troponin 0.05 (*)     All other components within normal limits    Narrative:     Performed at:  [address], [facility] "[employer]"   Phone [phone], RAPID    Narrative:     Performed at:  [address], [facility] "[employer]"   Phone [phone]   CULTURE, BLOOD 1   CULTURE, BLOOD 2   URINALYSIS    Narrative:     Performed at:  [address], [facility] "[employer]"   Phone [phone]   LIPASE    Narrative:     Performed at:  [address], [facility] "[employer]"   Phone [phone]   PROCALCITONIN       Interpretation per the Radiologist below, if obtained/available at the time of this note:    XR CHEST PORTABLE   Final Result   Cardiomegaly with mild vascular congestion. Patchy asymmetric airspace disease right lung base could represent   superimposed atelectasis or pneumonia. All other labs/imaging were within normal range or not returned as of this dictation. EMERGENCY DEPARTMENT COURSE and DIFFERENTIAL DIAGNOSIS/MDM:   Vitals:    Vitals:    05/13/21 1128 05/13/21 1241 05/13/21 1341   BP: (!) 174/59 (!) 160/81 (!) 159/65   Pulse: 64 66 69   Resp: 18 20 21   Temp: 97.8 °F (36.6 °C)     TempSrc: Oral     SpO2: 94% 98% 96%   Weight: 153 lb (69.4 kg)         EKG: Sinus rhythm rate of 68 bpm.  Left anterior fascicular block. No ST elevation. Compared to prior from 8/17/2015 no change.     Patient presents emergency department today complaining of epigastric tightness. No tenderness on abdominal exam whatsoever. More concerning for underlying cardiac source. Found to have elevated troponin and elevated BNP. Some peripheral edema noted on exam.  Concern for possible CHF as she is hypertensive here as well. Given aspirin and Lasix. Chest x-ray concerning for possible infiltrates and the patient states she has had a cough and some fever. Given Rocephin and azithromycin for possible community-acquired pneumonia and will be admitted to the hospital.  COVID-19 test sent and negative    MDM    CONSULTS     IP CONSULT TO HOSPITALIST    Critical Care:   None    REASSESSMENT          PROCEDURE     Unless otherwise noted below, none     Procedures      FINAL IMPRESSION      1. Acute on chronic congestive heart failure, unspecified heart failure type ([facility] [address]    2. Community acquired pneumonia, unspecified laterality            DISPOSITION/PLAN   DISPOSITION Admitted 05/13/2021 01:14:54 PM        PATIENT REFERRED TO:  No follow-up provider specified. DISCHARGE MEDICATIONS:  New Prescriptions    No medications on file     Controlled Substances Monitoring:     RX Monitoring 8/25/2015   Attestation The Prescription Monitoring Report for this patient was reviewed today.        (Please note that portions of this note were completed with a voice recognition program.  Efforts were made to edit the dictations but occasionally words are mis-transcribed.)    Epi Vallejo MD (electronically signed)  Attending Emergency Physician            Joseph Barclay MD  05/13/21 1400

## 2021-05-13 NOTE — PLAN OF CARE
Problem: Falls - Risk of:  Goal: Will remain free from falls  Description: Will remain free from falls  Outcome: Ongoing  Note: Patient will remain free of falls. Patient calls out appropriately. Patient utilizes call light, bed is in lowest locked position, nonskid footwear in place. Patient's room is kept free of clutter and patient is assisted with ambulation as needed. Will continue to monitor and educate patient on safety precautions. Problem: Pain:  Goal: Pain level will decrease  Description: Pain level will decrease  Outcome: Ongoing  Note: Patient will remain free of pain prior to discharge. Patient will utilize 0-10 scale to describe pain as well as onset, location, and duration. Patient will utilize nonpharmacologic and pharmacolgoic pain relief methods as educated by RN. PRN pain medications will be administered per orders as needed. Will continue to monitor patient.

## 2021-05-14 LAB
ANION GAP SERPL CALCULATED.3IONS-SCNC: 8 MMOL/L (ref 3–16)
BASOPHILS ABSOLUTE: 0 K/UL (ref 0–0.2)
BASOPHILS RELATIVE PERCENT: 2 %
BUN BLDV-MCNC: 30 MG/DL (ref 7–20)
BURR CELLS: ABNORMAL
CALCIUM SERPL-MCNC: 8.3 MG/DL (ref 8.3–10.6)
CHLORIDE BLD-SCNC: 98 MMOL/L (ref 99–110)
CO2: 25 MMOL/L (ref 21–32)
CREAT SERPL-MCNC: 1.7 MG/DL (ref 0.6–1.2)
EOSINOPHILS ABSOLUTE: 0.1 K/UL (ref 0–0.6)
EOSINOPHILS RELATIVE PERCENT: 3 %
GFR AFRICAN AMERICAN: 34
GFR NON-AFRICAN AMERICAN: 28
GLUCOSE BLD-MCNC: 83 MG/DL (ref 70–99)
HCT VFR BLD CALC: 26.5 % (ref 36–48)
HEMOGLOBIN: 9.1 G/DL (ref 12–16)
LYMPHOCYTES ABSOLUTE: 0.5 K/UL (ref 1–5.1)
LYMPHOCYTES RELATIVE PERCENT: 22 %
MACROCYTES: ABNORMAL
MCH RBC QN AUTO: 31.5 PG (ref 26–34)
MCHC RBC AUTO-ENTMCNC: 34.2 G/DL (ref 31–36)
MCV RBC AUTO: 92 FL (ref 80–100)
MONOCYTES ABSOLUTE: 0.1 K/UL (ref 0–1.3)
MONOCYTES RELATIVE PERCENT: 4 %
NEUTROPHILS ABSOLUTE: 1.7 K/UL (ref 1.7–7.7)
NEUTROPHILS RELATIVE PERCENT: 69 %
OVALOCYTES: ABNORMAL
PDW BLD-RTO: 18.9 % (ref 12.4–15.4)
PLATELET # BLD: 164 K/UL (ref 135–450)
PMV BLD AUTO: 7.4 FL (ref 5–10.5)
POIKILOCYTES: ABNORMAL
POTASSIUM REFLEX MAGNESIUM: 4.1 MMOL/L (ref 3.5–5.1)
RBC # BLD: 2.88 M/UL (ref 4–5.2)
SMUDGE CELLS: PRESENT
SODIUM BLD-SCNC: 131 MMOL/L (ref 136–145)
WBC # BLD: 2.4 K/UL (ref 4–11)

## 2021-05-14 PROCEDURE — 97110 THERAPEUTIC EXERCISES: CPT

## 2021-05-14 PROCEDURE — 6370000000 HC RX 637 (ALT 250 FOR IP): Performed by: HOSPITALIST

## 2021-05-14 PROCEDURE — 6360000002 HC RX W HCPCS: Performed by: HOSPITALIST

## 2021-05-14 PROCEDURE — 80048 BASIC METABOLIC PNL TOTAL CA: CPT

## 2021-05-14 PROCEDURE — 97116 GAIT TRAINING THERAPY: CPT

## 2021-05-14 PROCEDURE — 36415 COLL VENOUS BLD VENIPUNCTURE: CPT

## 2021-05-14 PROCEDURE — 85025 COMPLETE CBC W/AUTO DIFF WBC: CPT

## 2021-05-14 PROCEDURE — 2580000003 HC RX 258: Performed by: HOSPITALIST

## 2021-05-14 PROCEDURE — 97162 PT EVAL MOD COMPLEX 30 MIN: CPT

## 2021-05-14 PROCEDURE — 1200000000 HC SEMI PRIVATE

## 2021-05-14 RX ORDER — THIAMINE HCL 100 MG
2500 TABLET ORAL DAILY
COMMUNITY

## 2021-05-14 RX ORDER — LEVOTHYROXINE SODIUM 112 UG/1
112 TABLET ORAL DAILY
COMMUNITY

## 2021-05-14 RX ADMIN — LEVOTHYROXINE SODIUM 112 MCG: 0.11 TABLET ORAL at 05:29

## 2021-05-14 RX ADMIN — PRAVASTATIN SODIUM 40 MG: 40 TABLET ORAL at 22:17

## 2021-05-14 RX ADMIN — CEFTRIAXONE SODIUM 1000 MG: 1 INJECTION, POWDER, FOR SOLUTION INTRAMUSCULAR; INTRAVENOUS at 13:38

## 2021-05-14 RX ADMIN — ACETAMINOPHEN 650 MG: 325 TABLET ORAL at 22:17

## 2021-05-14 RX ADMIN — AZITHROMYCIN MONOHYDRATE 500 MG: 500 INJECTION, POWDER, LYOPHILIZED, FOR SOLUTION INTRAVENOUS at 14:59

## 2021-05-14 RX ADMIN — ASPIRIN 81 MG: 81 TABLET, COATED ORAL at 10:10

## 2021-05-14 RX ADMIN — METOPROLOL TARTRATE 25 MG: 25 TABLET, FILM COATED ORAL at 22:17

## 2021-05-14 RX ADMIN — METOPROLOL TARTRATE 25 MG: 25 TABLET, FILM COATED ORAL at 10:10

## 2021-05-14 RX ADMIN — SODIUM CHLORIDE: 9 INJECTION, SOLUTION INTRAVENOUS at 05:30

## 2021-05-14 RX ADMIN — ENOXAPARIN SODIUM 30 MG: 30 INJECTION SUBCUTANEOUS at 10:10

## 2021-05-14 ASSESSMENT — PAIN SCALES - GENERAL
PAINLEVEL_OUTOF10: 4
PAINLEVEL_OUTOF10: 0

## 2021-05-14 NOTE — PROGRESS NOTES
Physical Therapy    Facility/Department: [facility] A2 CARD TELEMETRY  Initial Assessment and Treatment    NAME: Briana Foy  : 1926  MRN: [id number]    Date of Service: 2021    Discharge Recommendations:  24 hour supervision or assist, Home with Home health PT   PT Equipment Recommendations  Equipment Needed: Yes  Mobility Devices: Robbie Dame: Rolling  If pt is unable to be seen after this session, please let this note serve as discharge summary. Please see case management note for discharge disposition. Thank you. Assessment   Body structures, Functions, Activity limitations: Decreased functional mobility ; Decreased strength;Decreased endurance;Decreased balance;Decreased posture;Decreased safe awareness;Decreased high-level IADLs;Decreased ADL status; Increased pain  Assessment: Patient is a 80year old female who was admitted to [facility] on 21 with pneumonia. Patient stated that she is normally independent with all mobility. Today she required SBA assist for bed mobility, CGA for transfers and min assist to ambulate up to 10 feet with hand held assistance. When patient then ambulated with a rolling walker her balance significantly improved and she only needed CGA to ambulate 50 feet x 2. Patient presented with the therapy deficits listed above. PT recommends that this patient receive home PT to address these deficits. Patient is safe for home, when medically stable, with  family assist/supervision and continued use of a rolling walker. Patient will need a rolling walker for home. PT to continue to follow. Treatment Diagnosis: Decreased independence with functional mobility  Specific instructions for Next Treatment: progress mobility as tolerated. Prognosis: Good  Decision Making: Medium Complexity  PT Education: Goals;PT Role;Precautions;Transfer Training;Plan of Care; Functional Mobility Training;Disease Specific Education;General Safety;Gait Training;Pressure Relief; Injury Prevention Patient Education: Disease Specific Education: Patient educated on importance of OOB mobility, prevention of complications of bedrest, and general safety during hospitalization. Patient verbalized understanding but will need education reinforcement. Barriers to Learning: none  REQUIRES PT FOLLOW UP: Yes  Activity Tolerance  Activity Tolerance: Patient Tolerated treatment well  Activity Tolerance: Vitals: 96% on room air 70 BPM. Post activity 147/78 97% 77 BPM       Patient Diagnosis(es): The primary encounter diagnosis was Acute on chronic congestive heart failure, unspecified heart failure type ([facility] [address] A diagnosis of Community acquired pneumonia, unspecified laterality was also pertinent to this visit. has a past medical history of Cancer ([facility] [address] Cataract, High blood pressure, and Thyroid disorder. has a past surgical history that includes Hysterectomy; Cholecystectomy; Cataract removal; bladder suspension (2000); and Breast lumpectomy (Left, 08/21/15). Restrictions  Restrictions/Precautions  Restrictions/Precautions: General Precautions, Fall Risk, Up as Tolerated  Vision/Hearing  Vision: Impaired  Vision Exceptions: Wears glasses at all times  Hearing: Within functional limits     Subjective  General  Chart Reviewed: Yes  Patient assessed for rehabilitation services?: Yes  Family / Caregiver Present: No  Referring Practitioner: Susan Gomez MD  Referral Date : 05/14/21  Follows Commands: Within Functional Limits  General Comment  Comments: Cleared for therapy by RN. patient supine in bed. Subjective  Subjective: Patient agreed to participate in therapy. Pain Screening  Patient Currently in Pain: Denies  Vital Signs  Patient Currently in Pain: Denies       Orientation  Orientation  Overall Orientation Status: Within Normal Limits  Social/Functional History  Social/Functional History  Lives With: Alone(patient said that she has 3 sons.  patient said that they visit about every other day.)  Type of Home: House  Home Layout: One level, Able to Live on Main level with bedroom/bathroom  Home Access: Stairs to enter with rails  Entrance Stairs - Number of Steps: 4  Entrance Stairs - Rails: Left  Bathroom Shower/Tub: Tub/Shower unit  Bathroom Toilet: Handicap height  Bathroom Equipment: Grab bars in shower  Home Equipment: [address], Grab bars  ADL Assistance: Needs assistance  Bath: Independent  Dressing: Independent  Grooming: Independent  Feeding: Independent  Toileting: Independent  Homemaking Assistance: Needs assistance  Meal Prep: Stand by(patient said that she does her own cooking but she has a cleaning lady that visits once a week)  Laundry: Total  Vacuuming: Total  Cleaning: Total  Ambulation Assistance: Independent  Transfer Assistance: Independent  Active : No  Patient's  Info: family drives  Occupation: Retired  Leisure & Hobbies: sudoko, puzzles  Additional Comments: 1 fall in the past 6 months. No serious injuries. Cognition   Cognition  Overall Cognitive Status: Exceptions  Arousal/Alertness: Appropriate responses to stimuli  Following Commands: Follows multistep commands with increased time; Follows multistep commands with repitition  Attention Span: Appears intact  Memory: Appears intact  Safety Judgement: Decreased awareness of need for safety  Problem Solving: Decreased awareness of errors  Insights: Decreased awareness of deficits  Initiation: Requires cues for some  Sequencing: Requires cues for some  Cognition Comment: cueing for safety.     Objective     Observation/Palpation  Posture: Fair    PROM RLE (degrees)  RLE PROM: WFL  AROM RLE (degrees)  RLE AROM: WFL  PROM LLE (degrees)  LLE PROM: WFL  AROM LLE (degrees)  LLE AROM : WFL  Strength RLE  Comment: grossly 4/5 strength bilaterally  Strength LLE  Comment: grossly 4/5 strength bilaterally     Sensation  Overall Sensation Status: WFL  Bed mobility  Supine to Sit: Stand by assistance  Sit to Supine: Unable to assess(patient in the Balance Training, Endurance Training, Patient/Caregiver Education & Training, Functional Mobility Training, Equipment Evaluation, Education, & procurement, Home Exercise Program, Transfer Training, Gait Training, ADL/Self-care Training, Pain Management, Positioning, ROM  Safety Devices  Type of devices: All fall risk precautions in place, Call light within reach, Gait belt, Patient at risk for falls, Chair alarm in place, Nurse notified, Left in chair    AM-PAC Score     AM-PAC Inpatient Mobility without Stair Climbing Raw Score : 15 (05/14/21 1809)  AM-PAC Inpatient without Stair Climbing T-Scale Score : 43.03 (05/14/21 1809)  Mobility Inpatient CMS 0-100% Score: 47.43 (05/14/21 1809)  Mobility Inpatient without Stair CMS G-Code Modifier : CK (05/14/21 1809)       Goals  Short term goals  Time Frame for Short term goals: 1 week 5/21/21  Short term goal 1: Supine <> sit with independence. Short term goal 2: Sit <> stand with independence. Short term goal 3: Bed <> chair with LRAD and modified independence. Short term goal 4: Ambulate 50 feet with LRAD and mod I  Short term goal 5: Patient will ascend 2 steps with rail and min assist.  Patient Goals   Patient goals : To become stronger and to go home.        Therapy Time   Individual Concurrent Group Co-treatment   Time In 5340         Time Out 1640         Minutes 35         Timed Code Treatment Minutes: 25 Minutes(10 minute evaluation)       Angie Vasquez PT

## 2021-05-14 NOTE — PROGRESS NOTES
Hospitalist Progress Note      PCP: Sanna Villareal MD    Date of Admission: 5/13/2021         Subjective:     Patient feels better. No chest pain or shortness of breath      Medications:  Reviewed    Infusion Medications    sodium chloride      sodium chloride 50 mL/hr at 05/14/21 0530     Scheduled Medications    pravastatin  40 mg Oral Nightly    metoprolol tartrate  25 mg Oral BID    levothyroxine  112 mcg Oral Daily    aspirin  81 mg Oral Daily    sodium chloride flush  5-40 mL Intravenous 2 times per day    enoxaparin  30 mg Subcutaneous Daily    cefTRIAXone (ROCEPHIN) IV  1,000 mg Intravenous Q24H    And    azithromycin  500 mg Intravenous Q24H     PRN Meds: sodium chloride flush, sodium chloride, promethazine **OR** ondansetron, polyethylene glycol, acetaminophen **OR** acetaminophen      Intake/Output Summary (Last 24 hours) at 5/14/2021 1225  Last data filed at 5/14/2021 0530  Gross per 24 hour   Intake 1190.83 ml   Output 1800 ml   Net -609.17 ml       Exam:    BP (!) 157/68   Pulse 64   Temp 98 °F (36.7 °C) (Oral)   Resp 14   Ht 5' 2\" (1.575 m)   Wt 153 lb 12.8 oz (69.8 kg)   SpO2 95%   BMI 28.13 kg/m²     General appearance: No apparent distress, appears stated age and cooperative. HEENT: Pupils equal, round, and reactive to light. Conjunctivae/corneas clear. Neck: Supple, with full range of motion. No jugular venous distention. Trachea midline. Respiratory:  Normal respiratory effort. Clear to auscultation, bilaterally without Rales/Wheezes/Rhonchi. Cardiovascular: Regular rate and rhythm with normal S1/S2 without murmurs, rubs or gallops. Abdomen: Soft, non-tender, non-distended with normal bowel sounds. Musculoskeletal: No clubbing, cyanosis or edema bilaterally. Full range of motion without deformity. Skin: Skin color, texture, turgor normal.  No rashes or lesions. Neurologic:  Neurovascularly intact without any focal sensory/motor deficits.  Cranial nerves: II-XII intact, grossly non-focal.  Psychiatric: Alert and oriented, thought content appropriate, normal insight  Capillary Refill: Brisk,< 3 seconds   Peripheral Pulses: +2 palpable, equal bilaterally       Labs:   Recent Labs     05/13/21  1135 05/14/21  0720   WBC 2.5* 2.4*   HGB 10.2* 9.1*   HCT 29.5* 26.5*    164     Recent Labs     05/13/21  1135 05/14/21  0720   * 131*   K 4.7 4.1   CL 94* 98*   CO2 22 25   BUN 33* 30*   CREATININE 1.9* 1.7*   CALCIUM 8.8 8.3     Recent Labs     05/13/21  1135   AST 29   ALT 18   BILITOT 0.4   ALKPHOS 96     No results for input(s): INR in the last 72 hours. Recent Labs     05/13/21  1135 05/13/21  1613 05/13/21  2200   TROPONINI 0.05* 0.04* 0.04*       Assessment/Plan:    -Bacterial pneumonia/CAP--bibasilar infiltrates on chest x-ray, mildly elevated procalcitonin, chest pain with mild cough , SLB and subjective fevers. .rapid covid is neg. .. Continue IV Rocephin and azithromycin. . Follow-up on culture data     - JIMMIE on CKD stage III--held diuretic and lisinopril therapy for now. c/w t IV fluids cautiously. closely monitor renal function avoid nephrotoxic medications . Caroline Hdz Creatinine is 1.9. .1.7.. Caroline Hdz Last creatinine was 1.74 in care everywhere 4/19/21-unclear what the baseline is     -Hyponatremiahypovolemic,Na 129--131 improving held diuretics and c/w  IV fluids--monitor sodium levels     -Mild troponin elevation in the setting of JIMMIE/CKD and infection-doubt ACS--follow-up on 2D echo        -Stage III left breast cancer, recurrent--on palbociclib 75 mg and monthly fulvestrant and trastuzumab    Follows with oncologist at [facility]     -Chemotherapy-induced anemia and neutropenia--ANC greater than 1000-similar to outpatient lab--follows with oncology--continue to monitor     -Alzheimer's dementia--mild-continue supportive care        -Recent UTI-resolved-completed antibiotics with Cipro the day of admission     -Essential hypertensionstablecontinue metoprolol.   Held HCTZ due to hyponatremia JIMMIE     -Osteoporosison Fosamax     -Hypothyroidismcontinue Synthroid     -Hyperlipidemiacontinue statin           DVT Prophylaxis: Lovenox  Diet: DIET CARDIAC;  Code Status: DNR-CC        Disposition. . Anticipate discharge home in 24 hours if she continues to improve        Alessandro Wells MD

## 2021-05-15 VITALS
DIASTOLIC BLOOD PRESSURE: 62 MMHG | SYSTOLIC BLOOD PRESSURE: 160 MMHG | BODY MASS INDEX: 28.3 KG/M2 | OXYGEN SATURATION: 95 % | HEIGHT: 62 IN | WEIGHT: 153.8 LBS | TEMPERATURE: 97.8 F | RESPIRATION RATE: 18 BRPM | HEART RATE: 64 BPM

## 2021-05-15 LAB
ANION GAP SERPL CALCULATED.3IONS-SCNC: 10 MMOL/L (ref 3–16)
BASOPHILS ABSOLUTE: 0.1 K/UL (ref 0–0.2)
BASOPHILS RELATIVE PERCENT: 2.3 %
BUN BLDV-MCNC: 22 MG/DL (ref 7–20)
CALCIUM SERPL-MCNC: 8.6 MG/DL (ref 8.3–10.6)
CHLORIDE BLD-SCNC: 101 MMOL/L (ref 99–110)
CO2: 22 MMOL/L (ref 21–32)
CREAT SERPL-MCNC: 1.4 MG/DL (ref 0.6–1.2)
EOSINOPHILS ABSOLUTE: 0.1 K/UL (ref 0–0.6)
EOSINOPHILS RELATIVE PERCENT: 2.1 %
GFR AFRICAN AMERICAN: 42
GFR NON-AFRICAN AMERICAN: 35
GLUCOSE BLD-MCNC: 100 MG/DL (ref 70–99)
HCT VFR BLD CALC: 33.3 % (ref 36–48)
HEMOGLOBIN: 10.9 G/DL (ref 12–16)
LV EF: 58 %
LVEF MODALITY: NORMAL
LYMPHOCYTES ABSOLUTE: 0.8 K/UL (ref 1–5.1)
LYMPHOCYTES RELATIVE PERCENT: 20.8 %
MCH RBC QN AUTO: 31.3 PG (ref 26–34)
MCHC RBC AUTO-ENTMCNC: 32.8 G/DL (ref 31–36)
MCV RBC AUTO: 95.2 FL (ref 80–100)
MONOCYTES ABSOLUTE: 0.2 K/UL (ref 0–1.3)
MONOCYTES RELATIVE PERCENT: 6 %
NEUTROPHILS ABSOLUTE: 2.8 K/UL (ref 1.7–7.7)
NEUTROPHILS RELATIVE PERCENT: 68.8 %
PDW BLD-RTO: 19.6 % (ref 12.4–15.4)
PLATELET # BLD: 173 K/UL (ref 135–450)
PMV BLD AUTO: 7.2 FL (ref 5–10.5)
POTASSIUM SERPL-SCNC: 4.3 MMOL/L (ref 3.5–5.1)
PROCALCITONIN: 0.24 NG/ML (ref 0–0.15)
RBC # BLD: 3.49 M/UL (ref 4–5.2)
SODIUM BLD-SCNC: 133 MMOL/L (ref 136–145)
WBC # BLD: 4 K/UL (ref 4–11)

## 2021-05-15 PROCEDURE — 6370000000 HC RX 637 (ALT 250 FOR IP): Performed by: HOSPITALIST

## 2021-05-15 PROCEDURE — 85025 COMPLETE CBC W/AUTO DIFF WBC: CPT

## 2021-05-15 PROCEDURE — C8929 TTE W OR WO FOL WCON,DOPPLER: HCPCS

## 2021-05-15 PROCEDURE — 36415 COLL VENOUS BLD VENIPUNCTURE: CPT

## 2021-05-15 PROCEDURE — 84145 PROCALCITONIN (PCT): CPT

## 2021-05-15 PROCEDURE — 6360000002 HC RX W HCPCS: Performed by: HOSPITALIST

## 2021-05-15 PROCEDURE — 80048 BASIC METABOLIC PNL TOTAL CA: CPT

## 2021-05-15 PROCEDURE — 2580000003 HC RX 258: Performed by: HOSPITALIST

## 2021-05-15 RX ORDER — AMOXICILLIN AND CLAVULANATE POTASSIUM 500; 125 MG/1; MG/1
1 TABLET, FILM COATED ORAL 2 TIMES DAILY
Qty: 10 TABLET | Refills: 0 | Status: ON HOLD | OUTPATIENT
Start: 2021-05-15 | End: 2021-05-25 | Stop reason: HOSPADM

## 2021-05-15 RX ADMIN — METOPROLOL TARTRATE 25 MG: 25 TABLET, FILM COATED ORAL at 08:54

## 2021-05-15 RX ADMIN — LEVOTHYROXINE SODIUM 112 MCG: 0.11 TABLET ORAL at 06:10

## 2021-05-15 RX ADMIN — SODIUM CHLORIDE: 9 INJECTION, SOLUTION INTRAVENOUS at 06:10

## 2021-05-15 RX ADMIN — ASPIRIN 81 MG: 81 TABLET, COATED ORAL at 08:54

## 2021-05-15 RX ADMIN — ACETAMINOPHEN 650 MG: 325 TABLET ORAL at 08:59

## 2021-05-15 RX ADMIN — ENOXAPARIN SODIUM 30 MG: 30 INJECTION SUBCUTANEOUS at 08:54

## 2021-05-15 ASSESSMENT — PAIN SCALES - GENERAL: PAINLEVEL_OUTOF10: 2

## 2021-05-15 ASSESSMENT — PAIN DESCRIPTION - PAIN TYPE: TYPE: ACUTE PAIN

## 2021-05-15 ASSESSMENT — PAIN DESCRIPTION - LOCATION
LOCATION: RIB CAGE
LOCATION: RIB CAGE

## 2021-05-15 NOTE — DISCHARGE INSTR - COC
Continuity of Care Form    Patient Name: Kenn Russell   :  1926  MRN:  [id number]    Admit date:  2021  Discharge date:  ***    Code Status Order: [facility]   Advance Directives:   [address] [facility] Documentation       Date/Time Healthcare Directive Type of Healthcare Directive Copy in [address] Agent's Name Healthcare Agent's Phone Number    [phone]  Yes, patient has an advance directive for healthcare treatment  Durable power of  for health care;Living will  No, copy requested from family  Adult Children                Admitting Physician:  Deedee Kirk MD  PCP: Philipp Mars MD    Discharging Nurse: [facility] Unit/Room#: 0215/0215-01  Discharging Unit Phone Number: ***    Emergency Contact:   Extended Emergency Contact Information  Primary Emergency Contact: [address] Phone: [phone]  Work Phone: [phone]  Mobile Phone: [phone]  Relation: Child  Secondary Emergency Contact: [facility] Phone: [phone]  Relation: Child    Past Surgical History:  Past Surgical History:   Procedure Laterality Date    BLADDER SUSPENSION      BREAST LUMPECTOMY Left 08/21/15    CATARACT REMOVAL      CHOLECYSTECTOMY      HYSTERECTOMY         Immunization History: There is no immunization history on file for this patient.     Active Problems:  Patient Active Problem List   Diagnosis Code    Breast cancer of upper-inner quadrant of left female breast ([facility] [address] C50.212    Greater trochanteric bursitis M70.60    Pneumonia J18.9       Isolation/Infection:   Isolation            No Isolation          Patient Infection Status       Infection Onset Added Last Indicated Last Indicated By Review Planned Expiration Resolved Resolved By    None active    Resolved    COVID-19 Rule Out 21 SARS-CoV-2 NAAT (Rapid) (Ordered)   21 Rule-Out Test Resulted            Nurse Assessment:  Last Vital Signs: BP (!) 160/62   Pulse 64   Temp 97.8 °F (36.6 °C) (Oral)   Resp 18   Ht 5' 2\" (1.575 m)   Wt 153 lb 12.8 oz (69.8 kg)   SpO2 95%   BMI 28.13 kg/m²     Last documented pain score (0-10 scale): Pain Level: 2  Last Weight:   Wt Readings from Last 1 Encounters:   05/13/21 153 lb 12.8 oz (69.8 kg)     Mental Status:  {IP PT MENTAL STATUS:20030:::0}    IV Access:  { NIKHIL IV ACCESS:821092572:::0}    Nursing Mobility/ADLs:  Walking   {CHP DME ADLs:674018934:::0}  Transfer  {CHP DME ADLs:190073263:::0}  Bathing  {CHP DME ADLs:175327614:::0}  Dressing  {CHP DME ADLs:386139955:::0}  Toileting  {CHP DME ADLs:745342184:::0}  Feeding  {CHP DME ADLs:969884850:::0}  Med Admin  {P DME ADLs:901944734:::0}  Med Delivery   { NIKHIL MED Delivery:349861015:::0}    Wound Care Documentation and Therapy:  Incision 08/21/15 Chest Left (Active)   Number of days: 2093        Elimination:  Continence: Bowel: {YES / FD:59610}  Bladder: {YES / UN:99159}  Urinary Catheter: {Urinary Catheter:626074924:::0}   Colostomy/Ileostomy/Ileal Conduit: {YES / AI:38785}       Date of Last BM: ***    Intake/Output Summary (Last 24 hours) at 5/15/2021 1318  Last data filed at 5/15/2021 0938  Gross per 24 hour   Intake 2230 ml   Output 1600 ml   Net 630 ml     I/O last 3 completed shifts: In: 1990 [P.O.:760;  I.V.:1230]  Out: 1600 [Urine:1600]    Safety Concerns:     508 m2M Strategies Safety Concerns:229585971:::0}    Impairments/Disabilities:      508 m2M Strategies Impairments/Disabilities:383998798:::0}    Nutrition Therapy:  Current Nutrition Therapy:   Smooth Li NIKHIL Diet List:678224620:::0}    Routes of Feeding: {CHP DME Other Feedings:300420315:::0}  Liquids: {Slp liquid thickness:45380}  Daily Fluid Restriction: {CHP DME Yes amt example:664160832:::0}  Last Modified Barium Swallow with Video (Video Swallowing Test): {Done Not Done DRPK:747535572:::5}    Treatments at the Time of Hospital Discharge:   Respiratory Treatments: ***  Oxygen Therapy:  {Therapy; copd oxygen:76694:::0}  Ventilator:    {[facility] Vent List:855803232:::0}    Rehab Therapies: {THERAPEUTIC INTERVENTION:1154439648}  Weight Bearing Status/Restrictions: {[facility] Weight Bearin:::0}  Other Medical Equipment (for information only, NOT a DME order):  {EQUIPMENT:552605929}  Other Treatments: ***    Patient's personal belongings (please select all that are sent with patient):  {CHP DME Belongings:274462918:::0}    RN SIGNATURE:  {Esignature:396818302:::0}    CASE MANAGEMENT/SOCIAL WORK SECTION    Inpatient Status Date: ***    Readmission Risk Assessment Score:  Readmission Risk              Risk of Unplanned Readmission:  17           Discharging to Facility/ Agency   Name:   Address:  Phone:  Fax:    Dialysis Facility (if applicable)   Name:  Address:  Dialysis Schedule:  Phone:  Fax:    / signature: {Esignature:627290465:::0}    PHYSICIAN SECTION    Prognosis: Guarded    Condition at Discharge: Stable    Rehab Potential (if transferring to Rehab): Guarded    Recommended Labs or Other Treatments After Discharge: BMP in 1 week    Physician Certification: I certify the above information and transfer of Zenon Bowman  is necessary for the continuing treatment of the diagnosis listed and that she requires Home Care for greater 30 days.      Update Admission H&P: No change in H&P    PHYSICIAN SIGNATURE:  Electronically signed by Jorge L Cosme MD on 5/15/21 at 1:19 PM EDT

## 2021-05-15 NOTE — PROGRESS NOTES
Patient given discharge instructions and voiced understanding via teach back. Patient discharged in stable condition with all belongings. To car via wheelchair. Home with daughter.  KEYON,YT

## 2021-05-15 NOTE — DISCHARGE SUMMARY
Hospital Discharge Summary    Patient's PCP: Nilda Rodriguez MD  Admit Date: 5/13/2021   Discharge Date: 5/15/2021    Admitting Physician: Dr. Alessandro Wells MD  [facility]   Consults: none    Brief HPI:          80 y.o. female with history of recurrent stage III left breast cancer on chemotherapy, hypertension, CKD stage III, hypothyroidism, dementia presented to emergency room with complaints of pain underneath her lower rib cage and radiating to the back for the past 1 day. . She also had a small dry cough a few days ago with shortness of breath and subjective fevers. .. No URI symptoms, nausea vomiting or diarrhea. .  She is fully vaccinated for Covid-19. .  In the emergency room, she was afebrile with no leukocytosis. . Chest x-ray showed bibasilar infiltrates/atelectasis. . Recent leukopenia of 2.4k, troponin 0 0.05. Gale Grumbles EKG showed no acute ischemic changes. rapid covid test was neg  She was given IV Rocephin and azithromycin for presumed pneumonia    She was treated for the following. .    -Bacterial pneumonia/CAP--bibasilar infiltrates on chest x-ray, mildly elevated procalcitonin, chest pain with mild cough , SLB and subjective fevers. .rapid covid is neg. .. received 3 doses of Rocephin and azithromycin in the hospital and discharged on Augmentin to complete 7-day course of antibiotics     - JIMMIE on CKD stage III--held diuretic and lisinopril therapy for now. received IV fluids cautiously. closely monitored renal function avoided nephrotoxic medications . . Creatinine improved from 1.9. .1.7..1.4.. Christy Owusu creatinine was 1.74 in care everywhere 4/19/21-unclear what the baseline is     -Hyponatremiahypovolemic,Na 129--133 improved improved with IV fluids as diuretics were held     -Mild troponin elevation in the setting of JIMMIE/CKD and infection-doubt ACS--echo showed no wall motion normalities    -Severe AS and severe pulmonary hypertension evident on echo 5/15/21--follow-up with cardiologist first        -Stage III left breast cancer, recurrent--on palbociclib 75 mg and monthly fulvestrant and trastuzumab    Follows with oncologist at [facility]     -Chemotherapy-induced anemia and neutropenia--ANC greater than 1000-similar to outpatient lab--follows with oncology--neutropenia resolved prior to discharge     -Alzheimer's dementia--mild-continue supportive care        -Recent UTI-resolved-completed antibiotics with Cipro prior to admission     -Essential hypertensionstablecontinue metoprolol.  Held HCTZ/lisinopril due to hyponatremia JIMMIE     -Osteoporosison Fosamax     -Hypothyroidismcontinue Synthroid     -Hyperlipidemiacontinue statin        Invasive procedures:  none    Discharge Diagnoses: Active Problems:    Pneumonia  Resolved Problems:    * No resolved hospital problems. *      Physical Exam: BP (!) 160/62   Pulse 64   Temp 97.8 °F (36.6 °C) (Oral)   Resp 18   Ht 5' 2\" (1.575 m)   Wt 153 lb 12.8 oz (69.8 kg)   SpO2 95%   BMI 28.13 kg/m²   Gen/overall appearance: Not in acute distress. Alert. Head: Normocephalic, atraumatic  Eyes: EOMI, good acuity  ENT:- Oral mucosa moist  Neck: No JVD, thyromegaly  CVS: Nml S1S2, no MRG, RRR  Pulm: Clear bilaterally. No crackles/wheezes  Gastrointestinal: Soft, NT/ND, +BS  Musculoskeletal: No edema. Warm  Neuro: No focal deficit. Moves extremity spontaneously. Psychiatry: Appropriate affect. Not agitated. Skin: Warm, dry with normal turgor. No rash        Significant Diagnostic Studies:    echo  Normal left ventricular systolic function with ejection fraction of 55-60%. No regional wall motion abnormalites are seen. Moderate concentric left ventricular hypertrophy. Left ventricular diastolic filling pressure is elevated. Moderate mitral stenosis. Mild mitral and tricuspid regurgitation. Severe aortic stenosis. Moderate aortic regurgitation. Systolic pulmonic artery pressure    CXR--  Cardiomegaly with mild vascular congestion. Patchy asymmetric airspace disease right lung base could represent   superimposed atelectasis or pneumonia.              Treatments: As above. Discharge Medications:     Medication List      START taking these medications    amoxicillin-clavulanate 500-125 MG per tablet  Commonly known as: Augmentin  Take 1 tablet by mouth 2 times daily for 5 days        CONTINUE taking these medications    CALTRATE 600 PO     clopidogrel 75 MG tablet  Commonly known as: PLAVIX     dilTIAZem 300 MG extended release capsule  Commonly known as: TIAZAC     Fosamax 70 MG tablet  Generic drug: alendronate     furosemide 20 MG tablet  Commonly known as: LASIX     levothyroxine 112 MCG tablet  Commonly known as: SYNTHROID     metoprolol tartrate 25 MG tablet  Commonly known as: LOPRESSOR     pravastatin 40 MG tablet  Commonly known as: PRAVACHOL     PRESERVISION AREDS 2 PO     rOPINIRole 0.25 MG tablet  Commonly known as: REQUIP     terazosin 2 MG capsule  Commonly known as: HYTRIN     traMADol 50 MG tablet  Commonly known as: ULTRAM     Vitamin B-12 2500 MCG Subl     VITAMIN D PO        STOP taking these medications    hydroCHLOROthiazide 12.5 MG tablet  Commonly known as: HYDRODIURIL     ibuprofen 600 MG tablet  Commonly known as: ADVIL;MOTRIN     lisinopril 20 MG tablet  Commonly known as: PRINIVIL;ZESTRIL     meloxicam 7.5 MG tablet  Commonly known as: Mobic           Where to Get Your Medications      These medications were sent to [facility] [address], [address] [facility] [phone] Sharyle Costa [phone]  [address] [facility], [place] [address]    Phone: [phone]   · amoxicillin-clavulanate 500-125 MG per tablet         Activity: activity as tolerated  Diet: DIET CARDIAC;       Disposition: home  Discharged Condition: Stable  Follow Up:   MD Coni Rice 34  [phone]    Schedule an appointment as soon as possible for a visit          Code status:  DNR-CC         Total time spent on discharge, finalizing medications, referrals and arranging outpatient follow up was more than 45 minutes      Thank you Dr. Eligio Monaco MD for the opportunity to be involved in this patients care.

## 2021-05-16 ENCOUNTER — TELEPHONE (OUTPATIENT)
Dept: OTHER | Facility: CLINIC | Age: 86
End: 2021-05-16

## 2021-05-16 ENCOUNTER — APPOINTMENT (OUTPATIENT)
Dept: CT IMAGING | Age: 86
DRG: 515 | End: 2021-05-16
Payer: MEDICARE

## 2021-05-16 ENCOUNTER — APPOINTMENT (OUTPATIENT)
Dept: GENERAL RADIOLOGY | Age: 86
DRG: 515 | End: 2021-05-16
Payer: MEDICARE

## 2021-05-16 ENCOUNTER — HOSPITAL ENCOUNTER (INPATIENT)
Age: 86
LOS: 9 days | Discharge: ANOTHER HOSPITAL | DRG: 515 | End: 2021-05-25
Attending: EMERGENCY MEDICINE | Admitting: INTERNAL MEDICINE
Payer: MEDICARE

## 2021-05-16 DIAGNOSIS — S22.069A CLOSED FRACTURE OF EIGHTH THORACIC VERTEBRA, UNSPECIFIED FRACTURE MORPHOLOGY, INITIAL ENCOUNTER (HCC): ICD-10-CM

## 2021-05-16 DIAGNOSIS — S22.000A COMPRESSION FRACTURE OF BODY OF THORACIC VERTEBRA (HCC): Primary | ICD-10-CM

## 2021-05-16 DIAGNOSIS — M48.54XS COMPRESSION FRACTURE OF THORACIC SPINE, NON-TRAUMATIC, SEQUELA: ICD-10-CM

## 2021-05-16 DIAGNOSIS — R53.1 GENERALIZED WEAKNESS: ICD-10-CM

## 2021-05-16 DIAGNOSIS — R55 SYNCOPE AND COLLAPSE: ICD-10-CM

## 2021-05-16 LAB
A/G RATIO: 1.1 (ref 1.1–2.2)
ALBUMIN SERPL-MCNC: 3.7 G/DL (ref 3.4–5)
ALP BLD-CCNC: 81 U/L (ref 40–129)
ALT SERPL-CCNC: 13 U/L (ref 10–40)
ANION GAP SERPL CALCULATED.3IONS-SCNC: 12 MMOL/L (ref 3–16)
AST SERPL-CCNC: 22 U/L (ref 15–37)
BACTERIA: ABNORMAL /HPF
BASOPHILS ABSOLUTE: 0 K/UL (ref 0–0.2)
BASOPHILS RELATIVE PERCENT: 0.7 %
BILIRUB SERPL-MCNC: 0.3 MG/DL (ref 0–1)
BILIRUBIN URINE: NEGATIVE
BLOOD, URINE: NEGATIVE
BUN BLDV-MCNC: 21 MG/DL (ref 7–20)
CALCIUM SERPL-MCNC: 8.7 MG/DL (ref 8.3–10.6)
CHLORIDE BLD-SCNC: 94 MMOL/L (ref 99–110)
CLARITY: CLEAR
CO2: 22 MMOL/L (ref 21–32)
COLOR: YELLOW
CREAT SERPL-MCNC: 1.2 MG/DL (ref 0.6–1.2)
EKG ATRIAL RATE: 55 BPM
EKG DIAGNOSIS: NORMAL
EKG P AXIS: 66 DEGREES
EKG P-R INTERVAL: 224 MS
EKG Q-T INTERVAL: 536 MS
EKG QRS DURATION: 102 MS
EKG QTC CALCULATION (BAZETT): 512 MS
EKG R AXIS: -44 DEGREES
EKG T AXIS: 58 DEGREES
EKG VENTRICULAR RATE: 55 BPM
EOSINOPHILS ABSOLUTE: 0 K/UL (ref 0–0.6)
EOSINOPHILS RELATIVE PERCENT: 0.4 %
EPITHELIAL CELLS, UA: ABNORMAL /HPF (ref 0–5)
GFR AFRICAN AMERICAN: 50
GFR NON-AFRICAN AMERICAN: 42
GLOBULIN: 3.4 G/DL
GLUCOSE BLD-MCNC: 161 MG/DL (ref 70–99)
GLUCOSE URINE: NEGATIVE MG/DL
HCT VFR BLD CALC: 28.7 % (ref 36–48)
HEMOGLOBIN: 9.8 G/DL (ref 12–16)
HYALINE CASTS: ABNORMAL /LPF (ref 0–2)
KETONES, URINE: NEGATIVE MG/DL
LEUKOCYTE ESTERASE, URINE: NEGATIVE
LYMPHOCYTES ABSOLUTE: 0.5 K/UL (ref 1–5.1)
LYMPHOCYTES RELATIVE PERCENT: 11.9 %
MCH RBC QN AUTO: 31.7 PG (ref 26–34)
MCHC RBC AUTO-ENTMCNC: 34 G/DL (ref 31–36)
MCV RBC AUTO: 93.3 FL (ref 80–100)
MICROSCOPIC EXAMINATION: YES
MONOCYTES ABSOLUTE: 0.2 K/UL (ref 0–1.3)
MONOCYTES RELATIVE PERCENT: 5.2 %
MUCUS: ABNORMAL /LPF
NEUTROPHILS ABSOLUTE: 3.3 K/UL (ref 1.7–7.7)
NEUTROPHILS RELATIVE PERCENT: 81.8 %
NITRITE, URINE: NEGATIVE
PDW BLD-RTO: 19 % (ref 12.4–15.4)
PH UA: 5 (ref 5–8)
PLATELET # BLD: 145 K/UL (ref 135–450)
PMV BLD AUTO: 7.3 FL (ref 5–10.5)
POTASSIUM SERPL-SCNC: 4 MMOL/L (ref 3.5–5.1)
PRO-BNP: 4951 PG/ML (ref 0–449)
PROTEIN UA: ABNORMAL MG/DL
RBC # BLD: 3.08 M/UL (ref 4–5.2)
RBC UA: ABNORMAL /HPF (ref 0–4)
SODIUM BLD-SCNC: 128 MMOL/L (ref 136–145)
SPECIFIC GRAVITY UA: >=1.03 (ref 1–1.03)
SPECIMEN STATUS: NORMAL
TOTAL PROTEIN: 7.1 G/DL (ref 6.4–8.2)
TROPONIN: 0.02 NG/ML
URINE TYPE: ABNORMAL
UROBILINOGEN, URINE: 0.2 E.U./DL
WBC # BLD: 4.1 K/UL (ref 4–11)
WBC UA: ABNORMAL /HPF (ref 0–5)

## 2021-05-16 PROCEDURE — 6360000002 HC RX W HCPCS: Performed by: EMERGENCY MEDICINE

## 2021-05-16 PROCEDURE — 1200000000 HC SEMI PRIVATE

## 2021-05-16 PROCEDURE — 83880 ASSAY OF NATRIURETIC PEPTIDE: CPT

## 2021-05-16 PROCEDURE — 94761 N-INVAS EAR/PLS OXIMETRY MLT: CPT

## 2021-05-16 PROCEDURE — 99284 EMERGENCY DEPT VISIT MOD MDM: CPT

## 2021-05-16 PROCEDURE — 70450 CT HEAD/BRAIN W/O DYE: CPT

## 2021-05-16 PROCEDURE — 6360000004 HC RX CONTRAST MEDICATION: Performed by: EMERGENCY MEDICINE

## 2021-05-16 PROCEDURE — 93010 ELECTROCARDIOGRAM REPORT: CPT | Performed by: INTERNAL MEDICINE

## 2021-05-16 PROCEDURE — 85025 COMPLETE CBC W/AUTO DIFF WBC: CPT

## 2021-05-16 PROCEDURE — 81001 URINALYSIS AUTO W/SCOPE: CPT

## 2021-05-16 PROCEDURE — 71045 X-RAY EXAM CHEST 1 VIEW: CPT

## 2021-05-16 PROCEDURE — 2700000000 HC OXYGEN THERAPY PER DAY

## 2021-05-16 PROCEDURE — 84484 ASSAY OF TROPONIN QUANT: CPT

## 2021-05-16 PROCEDURE — 71260 CT THORAX DX C+: CPT

## 2021-05-16 PROCEDURE — 93005 ELECTROCARDIOGRAM TRACING: CPT | Performed by: EMERGENCY MEDICINE

## 2021-05-16 PROCEDURE — 6370000000 HC RX 637 (ALT 250 FOR IP): Performed by: INTERNAL MEDICINE

## 2021-05-16 PROCEDURE — G0378 HOSPITAL OBSERVATION PER HR: HCPCS

## 2021-05-16 PROCEDURE — 2580000003 HC RX 258: Performed by: INTERNAL MEDICINE

## 2021-05-16 PROCEDURE — 96374 THER/PROPH/DIAG INJ IV PUSH: CPT

## 2021-05-16 PROCEDURE — 2580000003 HC RX 258: Performed by: EMERGENCY MEDICINE

## 2021-05-16 PROCEDURE — 80053 COMPREHEN METABOLIC PANEL: CPT

## 2021-05-16 RX ORDER — PRAVASTATIN SODIUM 40 MG
40 TABLET ORAL DAILY
Status: DISCONTINUED | OUTPATIENT
Start: 2021-05-17 | End: 2021-05-25 | Stop reason: HOSPADM

## 2021-05-16 RX ORDER — FAMOTIDINE 20 MG/1
20 TABLET, FILM COATED ORAL DAILY PRN
Status: DISCONTINUED | OUTPATIENT
Start: 2021-05-16 | End: 2021-05-19

## 2021-05-16 RX ORDER — DOXAZOSIN 2 MG/1
2 TABLET ORAL DAILY
Status: DISCONTINUED | OUTPATIENT
Start: 2021-05-17 | End: 2021-05-25 | Stop reason: HOSPADM

## 2021-05-16 RX ORDER — MAGNESIUM SULFATE IN WATER 40 MG/ML
2000 INJECTION, SOLUTION INTRAVENOUS PRN
Status: DISCONTINUED | OUTPATIENT
Start: 2021-05-16 | End: 2021-05-20

## 2021-05-16 RX ORDER — ROPINIROLE 0.5 MG/1
0.25 TABLET, FILM COATED ORAL NIGHTLY
Status: DISCONTINUED | OUTPATIENT
Start: 2021-05-16 | End: 2021-05-25 | Stop reason: HOSPADM

## 2021-05-16 RX ORDER — SODIUM CHLORIDE 0.9 % (FLUSH) 0.9 %
10 SYRINGE (ML) INJECTION PRN
Status: DISCONTINUED | OUTPATIENT
Start: 2021-05-16 | End: 2021-05-25 | Stop reason: HOSPADM

## 2021-05-16 RX ORDER — SODIUM CHLORIDE 0.9 % (FLUSH) 0.9 %
10 SYRINGE (ML) INJECTION EVERY 12 HOURS SCHEDULED
Status: DISCONTINUED | OUTPATIENT
Start: 2021-05-16 | End: 2021-05-25 | Stop reason: HOSPADM

## 2021-05-16 RX ORDER — ACETAMINOPHEN 325 MG/1
650 TABLET ORAL EVERY 6 HOURS PRN
Status: DISCONTINUED | OUTPATIENT
Start: 2021-05-16 | End: 2021-05-25 | Stop reason: HOSPADM

## 2021-05-16 RX ORDER — LEVOTHYROXINE SODIUM 112 UG/1
112 TABLET ORAL DAILY
Status: DISCONTINUED | OUTPATIENT
Start: 2021-05-17 | End: 2021-05-25 | Stop reason: HOSPADM

## 2021-05-16 RX ORDER — ONDANSETRON 2 MG/ML
4 INJECTION INTRAMUSCULAR; INTRAVENOUS EVERY 6 HOURS PRN
Status: DISCONTINUED | OUTPATIENT
Start: 2021-05-16 | End: 2021-05-25 | Stop reason: HOSPADM

## 2021-05-16 RX ORDER — VITAMIN B COMPLEX
1000 TABLET ORAL DAILY
Status: DISCONTINUED | OUTPATIENT
Start: 2021-05-17 | End: 2021-05-25 | Stop reason: HOSPADM

## 2021-05-16 RX ORDER — CLOPIDOGREL BISULFATE 75 MG/1
75 TABLET ORAL DAILY
Status: DISCONTINUED | OUTPATIENT
Start: 2021-05-17 | End: 2021-05-25 | Stop reason: HOSPADM

## 2021-05-16 RX ORDER — SODIUM CHLORIDE 9 MG/ML
25 INJECTION, SOLUTION INTRAVENOUS PRN
Status: DISCONTINUED | OUTPATIENT
Start: 2021-05-16 | End: 2021-05-25 | Stop reason: HOSPADM

## 2021-05-16 RX ORDER — 0.9 % SODIUM CHLORIDE 0.9 %
500 INTRAVENOUS SOLUTION INTRAVENOUS ONCE
Status: COMPLETED | OUTPATIENT
Start: 2021-05-16 | End: 2021-05-16

## 2021-05-16 RX ORDER — METOPROLOL TARTRATE 50 MG/1
50 TABLET, FILM COATED ORAL 2 TIMES DAILY
Status: DISCONTINUED | OUTPATIENT
Start: 2021-05-16 | End: 2021-05-25 | Stop reason: HOSPADM

## 2021-05-16 RX ORDER — ACETAMINOPHEN 650 MG/1
650 SUPPOSITORY RECTAL EVERY 6 HOURS PRN
Status: DISCONTINUED | OUTPATIENT
Start: 2021-05-16 | End: 2021-05-25 | Stop reason: HOSPADM

## 2021-05-16 RX ORDER — ONDANSETRON 2 MG/ML
4 INJECTION INTRAMUSCULAR; INTRAVENOUS ONCE
Status: COMPLETED | OUTPATIENT
Start: 2021-05-16 | End: 2021-05-16

## 2021-05-16 RX ORDER — AMOXICILLIN AND CLAVULANATE POTASSIUM 500; 125 MG/1; MG/1
1 TABLET, FILM COATED ORAL 2 TIMES DAILY
Status: DISCONTINUED | OUTPATIENT
Start: 2021-05-16 | End: 2021-05-18

## 2021-05-16 RX ORDER — TRAMADOL HYDROCHLORIDE 50 MG/1
50 TABLET ORAL EVERY 8 HOURS PRN
Status: DISCONTINUED | OUTPATIENT
Start: 2021-05-16 | End: 2021-05-18

## 2021-05-16 RX ORDER — DILTIAZEM HYDROCHLORIDE 300 MG/1
300 CAPSULE, COATED, EXTENDED RELEASE ORAL DAILY
Status: DISCONTINUED | OUTPATIENT
Start: 2021-05-17 | End: 2021-05-25 | Stop reason: HOSPADM

## 2021-05-16 RX ORDER — SODIUM CHLORIDE 9 MG/ML
1000 INJECTION, SOLUTION INTRAVENOUS ONCE
Status: COMPLETED | OUTPATIENT
Start: 2021-05-16 | End: 2021-05-17

## 2021-05-16 RX ORDER — PROMETHAZINE HYDROCHLORIDE 25 MG/1
12.5 TABLET ORAL EVERY 6 HOURS PRN
Status: DISCONTINUED | OUTPATIENT
Start: 2021-05-16 | End: 2021-05-25 | Stop reason: HOSPADM

## 2021-05-16 RX ORDER — LABETALOL HYDROCHLORIDE 5 MG/ML
10 INJECTION, SOLUTION INTRAVENOUS EVERY 4 HOURS PRN
Status: DISCONTINUED | OUTPATIENT
Start: 2021-05-16 | End: 2021-05-25 | Stop reason: HOSPADM

## 2021-05-16 RX ORDER — POTASSIUM CHLORIDE 7.45 MG/ML
10 INJECTION INTRAVENOUS PRN
Status: DISCONTINUED | OUTPATIENT
Start: 2021-05-16 | End: 2021-05-20

## 2021-05-16 RX ADMIN — METOPROLOL TARTRATE 50 MG: 50 TABLET, FILM COATED ORAL at 23:22

## 2021-05-16 RX ADMIN — SODIUM CHLORIDE 500 ML: 9 INJECTION, SOLUTION INTRAVENOUS at 15:02

## 2021-05-16 RX ADMIN — Medication 10 ML: at 21:19

## 2021-05-16 RX ADMIN — IOPAMIDOL 75 ML: 755 INJECTION, SOLUTION INTRAVENOUS at 17:16

## 2021-05-16 RX ADMIN — SODIUM CHLORIDE 1000 ML: 9 INJECTION, SOLUTION INTRAVENOUS at 21:19

## 2021-05-16 RX ADMIN — ONDANSETRON 4 MG: 2 INJECTION INTRAMUSCULAR; INTRAVENOUS at 15:02

## 2021-05-16 RX ADMIN — ROPINIROLE HYDROCHLORIDE 0.25 MG: 0.5 TABLET, FILM COATED ORAL at 23:22

## 2021-05-16 RX ADMIN — AMOXICILLIN AND CLAVULANATE POTASSIUM 1 TABLET: 500; 125 TABLET, FILM COATED ORAL at 23:50

## 2021-05-16 RX ADMIN — TRAMADOL HYDROCHLORIDE 50 MG: 50 TABLET, FILM COATED ORAL at 23:29

## 2021-05-16 ASSESSMENT — ENCOUNTER SYMPTOMS
WHEEZING: 0
ABDOMINAL PAIN: 0
VOMITING: 1
STRIDOR: 0
NAUSEA: 1
SHORTNESS OF BREATH: 0
CHEST TIGHTNESS: 0
DIARRHEA: 0
COUGH: 0

## 2021-05-16 ASSESSMENT — PAIN DESCRIPTION - LOCATION
LOCATION: BACK
LOCATION: BACK

## 2021-05-16 ASSESSMENT — PAIN SCALES - GENERAL
PAINLEVEL_OUTOF10: 0
PAINLEVEL_OUTOF10: 8
PAINLEVEL_OUTOF10: 4
PAINLEVEL_OUTOF10: 0

## 2021-05-16 ASSESSMENT — PAIN DESCRIPTION - FREQUENCY: FREQUENCY: INTERMITTENT

## 2021-05-16 ASSESSMENT — PAIN DESCRIPTION - DESCRIPTORS: DESCRIPTORS: ACHING

## 2021-05-16 NOTE — ED PROVIDER NOTES
[address] [facility] A2 CARD TELEMETRY  EMERGENCYDEPARTMENT ENCOUNTER      Pt Name: Rosa Elena Banegas  MRN: [id number]  [place] 1/21/1926  Date of evaluation: 5/16/2021  Paul Leger MD    CHIEF COMPLAINT       Chief Complaint   Patient presents with    Seizures     witnessed Shaking episode \"possible seizure\" per granddaughter. Pt states she never lost consciousness Recent admission here for pneumonia    Nausea         HISTORY OF PRESENT ILLNESS   (Location/Symptom, Timing/Onset,Context/Setting, Quality, Duration, Modifying Factors, Severity)  Note limiting factors. Rosa Elena Banegas is a 80 y.o. female who presents to the emergency department for syncope vs seizure. Patient remembers being helped to the bathroom by her granddaughter that she was on the toilet urinating when she felt like she was falling backwards. He states that she felt like she was going to pass out but did not do so. Recalls the entire event. Denies chest pain, shortness of breath.  -Per niece, patient was given tramadol at 1145 and took a nap for an hour. When she woke up, helped her to go to the bathroom. Patient sat down in the toilet and she scooted back and patient reported that she was not feeling good. Followed by an episode where she was witnessed having whole body shaking states that her eyes were open and heads were tilted back with upper extremity flexed and lower extremity extended. This still lasted for 30 to 45 seconds where she did not respond verbally. Granddaughter then grabbed her phone to call 911 and as soon as she did, patient stopped came to asking 'where am I, what happened.'  Patient knew where she was in who she was. She then repeated that she did not feel good and had an episode of emesis. HPI    Nursing Notes were reviewed. REVIEW OF SYSTEMS    (2-9 systems for level 4, 10 or more for level 5)     Review of Systems   Constitutional: Negative for activity change, appetite change, diaphoresis and fever. Respiratory: Negative for cough, chest tightness, shortness of breath, wheezing and stridor. Cardiovascular: Negative for chest pain and palpitations. Gastrointestinal: Positive for nausea and vomiting. Negative for abdominal pain and diarrhea. Genitourinary: Negative for dysuria and flank pain. Skin: Negative for rash and wound. Neurological: Positive for seizures and syncope. Negative for dizziness, weakness, light-headedness, numbness and headaches. Except as noted above the remainder of the review of systems was reviewedand negative.        PAST MEDICAL HISTORY     Past Medical History:   Diagnosis Date    Cancer [facility])     breast    Cataract     High blood pressure     Thyroid disorder          SURGICAL HISTORY       Past Surgical History:   Procedure Laterality Date    BLADDER SUSPENSION  2000    BREAST LUMPECTOMY Left 08/21/15    CATARACT REMOVAL      CHOLECYSTECTOMY      HYSTERECTOMY           CURRENT MEDICATIONS       Current Discharge Medication List      CONTINUE these medications which have NOT CHANGED    Details   amoxicillin-clavulanate (AUGMENTIN) 500-125 MG per tablet Take 1 tablet by mouth 2 times daily for 5 days  Qty: 10 tablet, Refills: 0      Cyanocobalamin (VITAMIN B-12) 2500 MCG SUBL Take 2,500 mcg by mouth daily      levothyroxine (SYNTHROID) 112 MCG tablet Take 112 mcg by mouth Daily      clopidogrel (PLAVIX) 75 MG tablet Take 75 mg by mouth daily      dilTIAZem (TIAZAC) 300 MG extended release capsule Take 300 mg by mouth daily      furosemide (LASIX) 20 MG tablet Take 20 mg by mouth daily      rOPINIRole (REQUIP) 0.25 MG tablet Take 0.25 mg by mouth nightly As needed for restless leg syndrome      terazosin (HYTRIN) 2 MG capsule Take 2 mg by mouth daily       Multiple Vitamins-Minerals (PRESERVISION AREDS 2 PO) Take 1 tablet by mouth 2 times daily       alendronate (FOSAMAX) 70 MG tablet Take 70 mg by mouth every 7 days      traMADol (ULTRAM) 50 MG tablet Take 50 mg by mouth every 8 hours as needed for Pain.       pravastatin (PRAVACHOL) 40 MG tablet Take 40 mg by mouth daily       metoprolol tartrate (LOPRESSOR) 25 MG tablet Take 50 mg by mouth 2 times daily       Cholecalciferol (VITAMIN D PO) Take by mouth daily       Calcium Carbonate (CALTRATE 600 PO) Take 1 tablet by mouth daily              ALLERGIES     Morphine and related, Nitrofurantoin, Ciprofloxacin, Codeine, and Sympathomimetics    FAMILY HISTORY       Family History   Problem Relation Age of Onset    Heart Disease Mother     High Blood Pressure Mother     High Blood Pressure Father     Stroke Father     Rheum Arthritis Sister     Cancer Sister           SOCIAL HISTORY       Social History     Socioeconomic History    Marital status:      Spouse name: None    Number of children: None    Years of education: None    Highest education level: None   Occupational History    None   Tobacco Use    Smoking status: Former Smoker     Quit date: 1989     Years since quittin.3    Smokeless tobacco: Never Used   Substance and Sexual Activity    Alcohol use: No     Alcohol/week: 0.0 standard drinks     Comment: 1 beer every other week    Drug use: No    Sexual activity: None   Other Topics Concern    None   Social History Narrative    None     Social Determinants of Health     Financial Resource Strain:     Difficulty of Paying Living Expenses:    Food Insecurity:     Worried About Running Out of Food in the Last Year:     Ran Out of Food in the Last Year:    Transportation Needs:     Lack of Transportation (Medical):      Lack of Transportation (Non-Medical):    Physical Activity:     Days of Exercise per Week:     Minutes of Exercise per Session:    Stress:     Feeling of Stress :    Social Connections:     Frequency of Communication with Friends and Family:     Frequency of Social Gatherings with Friends and Family:     Attends [employer] Services:     Active Member of Clubs or Organizations:     Attends Club or Organization Meetings:     Marital Status:    Intimate Partner Violence:     Fear of Current or Ex-Partner:     Emotionally Abused:     Physically Abused:     Sexually Abused:        SCREENINGS             PHYSICAL EXAM    (up to 7 for level 4, 8 ormore for level 5)     ED Triage Vitals [05/16/21 1335]   BP Temp Temp Source Pulse Resp SpO2 Height Weight   (!) 151/66 97.4 °F (36.3 °C) Oral 59 16 94 % 5' 2\" (1.575 m) 153 lb (69.4 kg)       Physical Exam  Constitutional:       General: She is not in acute distress. Appearance: Normal appearance. She is well-developed. She is not ill-appearing or toxic-appearing. Comments: Sitting in bed comfortably, speaking in full sentences, following verbal commands appropriately. Not in acute distress     HENT:      Head: Normocephalic and atraumatic. Eyes:      Conjunctiva/sclera: Conjunctivae normal.      Pupils: Pupils are equal, round, and reactive to light. Cardiovascular:      Rate and Rhythm: Normal rate and regular rhythm. Heart sounds: Normal heart sounds. No murmur heard. No friction rub. No gallop. Pulmonary:      Effort: Pulmonary effort is normal. No respiratory distress. Breath sounds: Normal breath sounds. No decreased breath sounds, wheezing, rhonchi or rales. Abdominal:      General: Bowel sounds are normal. There is no distension. Palpations: Abdomen is soft. Tenderness: There is no abdominal tenderness. There is no guarding or rebound. Musculoskeletal:         General: No tenderness or deformity. Normal range of motion. Cervical back: Normal range of motion and neck supple. Skin:     General: Skin is warm and dry. Findings: No rash. Neurological:      General: No focal deficit present. Mental Status: She is alert and oriented to person, place, and time. GCS: GCS eye subscore is 4. GCS verbal subscore is 5. GCS motor subscore is 6. Cranial Nerves:  No cranial nerve deficit. Sensory: No sensory deficit. Comments: No facial droop; slurred speech, or protonator drift noted. Moving all four extremities   Psychiatric:         Behavior: Behavior is cooperative. DIAGNOSTIC RESULTS     EKG: All EKG's are interpreted by the Emergency Department Physicianwho either signs or Co-signs this chart in the absence of a cardiologist.    The Ekg interpreted by me shows  sinus bradycardia, rate=55   Axis is   Normal  QTc is  512  Intervals and Durations are unremarkable. ST Segments: no acute change  No significant change from prior EKG dated 5/13/21    RADIOLOGY:   Non-plain film images such as CT, Ultrasound and MRI are read by the radiologist. Plain radiographic images are visualized and preliminarily interpreted by the emergency physician with the below findings:      Interpretation per the Radiologist below, if available at the time of this note:    CT CHEST PULMONARY EMBOLISM W CONTRAST   Final Result   1. No pulmonary embolism. 2. Cardiomegaly with a small right pleural effusion and mild interstitial   edema in the lung bases. 3. Small scattered bilateral pulmonary nodules without significant change   from 03/05/2021 measuring up to approximately 6 mm. 4. Unchanged mediastinal and right hilar lymphadenopathy. Stable to mildly   improved left axillary lymphadenopathy. 5. Moderate T8 vertebral body compression fracture new from 03/05/2021 with 5   mm retropulsion. If clinically indicated further evaluation could be   obtained with MRI. RECOMMENDATIONS:   Fleischner Society guidelines for follow-up and management of incidentally   detected pulmonary nodules:      Multiple Solid Nodules:      Nodule size less than 6 mm   In a high-risk patient, optional CT at 12 months. - Low risk patients include individuals with minimal or absent history of   smoking and other known risk factors.       - High risk patients include individuals with a history or smoking or known   risk factors. Radiology 2017 http://pubs. rsna.org/doi/full/10.1148/radiol. 8458269607         CT HEAD WO CONTRAST   Final Result   No acute intracranial abnormality. Mild paranasal and left mastoid air cell disease. XR CHEST PORTABLE   Final Result   No acute process. Improved aeration right lung base.                ED BEDSIDE ULTRASOUND:   Performed by ED Physician - none    LABS:  Labs Reviewed   CBC WITH AUTO DIFFERENTIAL - Abnormal; Notable for the following components:       Result Value    RBC 3.08 (*)     Hemoglobin 9.8 (*)     Hematocrit 28.7 (*)     RDW 19.0 (*)     Lymphocytes Absolute 0.5 (*)     All other components within normal limits    Narrative:     Performed at:  [place] [address] [address]   Phone [phone]   COMPREHENSIVE METABOLIC PANEL - Abnormal; Notable for the following components:    Sodium 128 (*)     Chloride 94 (*)     Glucose 161 (*)     BUN 21 (*)     GFR Non- 42 (*)     GFR  50 (*)     All other components within normal limits    Narrative:     Performed at:  [address] [place] [address] [facility]   Phone [phone]   TROPONIN - Abnormal; Notable for the following components:    Troponin 0.02 (*)     All other components within normal limits    Narrative:     Performed at:  [address] [place] [address] [facility]   Phone [phone]   URINALYSIS - Abnormal; Notable for the following components:    Protein, UA TRACE (*)     All other components within normal limits    Narrative:     Performed at:  [address] [place] [address] [facility]   Phone [phone]   MICROSCOPIC URINALYSIS - Abnormal; Notable for the following components:    Mucus, UA Rare (*)     Bacteria, UA 1+ (*)     All other components within normal limits    Narrative:     Performed at:  [facility]) [facility]  [address],  [address], [address] [place]   Phone [phone] - Abnormal; Notable for the following components:    Pro-BNP 4,951 (*)     All other components within normal limits    Narrative:     Performed at:  [facility]) [facility]  [address],  [address], [address] [place]   Phone [phone]   [address] TESTING    Narrative:     Performed at:  [facility]) [facility]  [address],  [address], [email or web address]   Phone [phone]   COMPREHENSIVE METABOLIC PANEL W/ REFLEX TO MG FOR LOW K   CBC WITH AUTO DIFFERENTIAL       All other labs were within normal range ornot returned as of this dictation. EMERGENCY DEPARTMENT COURSE and DIFFERENTIAL DIAGNOSIS/MDM:   Vitals:    Vitals:    05/16/21 2037 05/16/21 2109 05/16/21 2310 05/16/21 2349   BP: (!) 140/62 (!) 175/73 (!) 173/70 (!) 159/67   Pulse: 67 73 75 70   Resp: 16 18 18    Temp:  96.9 °F (36.1 °C) 97.5 °F (36.4 °C)    TempSrc:  Axillary Oral    SpO2: 92% 96% 100%    Weight:  154 lb (69.9 kg)     Height:  5' 2\" (1.575 m)           [facility]    ED COURSE/MDM    -Eloy Martinez is a 80 y.o. female with history of recurrent stage III breast cancer on chemotherapy, hypertension, CKD stage III who  presents to ED for possible syncope versus seizure episode. Patient alert and oriented. Per granddaughter who was helping patient to the bathroom, she had witnessed patient having whole body convulsion that lasted for less than 1 minute. Patient states that she remembers the entire event, does not recall shaking. Does state that she was feeling lightheaded and had some nausea.  -Lab work is significant for mild hyponatremia of 128, BUN of 21, troponin 0 0.02 which is improved from 5/13/2021 where 0.04.  H&H appears to be at baseline around 9-10.  -Chest x-ray shows no acute process. Improved aeration right lung base  -CT head shows no acute intracranial abnormality. Mild paranasal and left mastoid air cell disease  -Orthostatics: Negative for orthostatic hypotension  -UA: Negative nitrite, leukocyte esterase, 3-5 WBC, 0-2 RBC, 0-1 epithelial cells  -Of note patient was admitted 5/13/2021 for bacterial pneumonia. She was given 3 doses of Rocephin azithromycin the hospital and discharged on Augmentin. Family expresses that they thought she was discharged too early. -Given the patient is currently on chemotherapy, with nurse noting that patient was hypoxic (88%) on room air upon arrival, decided to do a CT PE to rule out PE. Scan shows no pulmonary embolism. Cardiomegaly with a small right pleural effusion and mild interstitial edema in the lung bases. Small scattered bilateral pulmonary nodules without significant change from 3/5/2021. Unchanged mediastinal and right hilar lymphadenopathy. Moderate T8 vertebral body compression fracture new from 3/5/2021 with 5 mm retropulsion.  -Perfect served hospitalists, Yamilet Johnston for admission for syncope versus seizure episode, generalized weakness as noted by family. However she states that she will be waiting until the night hospitalist arrives for him to evaluate patient. I then spoke with the nocturnist, Dr. Travon Flannery regarding  patient's presentation and findings and reason for admission. He however states that he does not feel she needs to be admitted. She is \"Comfort Care\" and feel that there is not much to be done for her inpatient. He is wondering what she would be observed for. I informed him that at this point she had a syncopal episode unclear etiology followed by several episodes of emesis. Nurse noted that she was also hypoxic, and she was recently discharged for pneumonia. Family is also concerned that she was discharged too soon and has been having generalized weakness.   He is requesting consult and discussion with neurologist who then if they recommend admission he will put in admission orders. Neurologist was consulted. However before I was able to speak with them, Dr. Flor Mcdonough informed me that due to the new vertebral body compression fracture that was seen on the CT scan, he would admit her. REASSESSMENT      Well appearing, non toxic, alert, oriented speaking in full sentences and hemodynamically stable upon admission      CRITICAL CARE TIME   Total Critical Care time was 0 minutes, excluding separately reportableprocedures. There was a high probability of clinicallysignificant/life threatening deterioration in the patient's condition which required my urgent intervention. CONSULTS:  IP CONSULT TO HOSPITALIST  IP CONSULT TO NEUROLOGY  IP CONSULT TO PALLIATIVE CARE  IP CONSULT TO SPINE    PROCEDURES:  Unless otherwise noted below, none     Procedures    FINAL IMPRESSION      1. Compression fracture of body of thoracic vertebra (HCC)    2. Syncope and collapse    3. Generalized weakness          DISPOSITION/PLAN   DISPOSITION        PATIENT REFERREDTO:  No follow-up provider specified.     DISCHARGE MEDICATIONS:  Current Discharge Medication List             (Please note that portions of this note were completed with a voice recognition program.  Efforts were made to edit the dictations but occasionally wordsare mis-transcribed.)    Vu León MD (electronically signed)  Attending Emergency Physician            uV León MD  05/16/21 [phone]

## 2021-05-16 NOTE — ED NOTES
Bed: 02  Expected date:   Expected time:   Means of arrival:   Comments:  M48 ut     Radha Kolb RN  05/16/21 3308

## 2021-05-16 NOTE — ED NOTES

## 2021-05-17 LAB
A/G RATIO: 1.1 (ref 1.1–2.2)
ALBUMIN SERPL-MCNC: 3.2 G/DL (ref 3.4–5)
ALP BLD-CCNC: 70 U/L (ref 40–129)
ALT SERPL-CCNC: 10 U/L (ref 10–40)
ANION GAP SERPL CALCULATED.3IONS-SCNC: 8 MMOL/L (ref 3–16)
AST SERPL-CCNC: 18 U/L (ref 15–37)
BASOPHILS ABSOLUTE: 0 K/UL (ref 0–0.2)
BASOPHILS RELATIVE PERCENT: 1.1 %
BILIRUB SERPL-MCNC: 0.3 MG/DL (ref 0–1)
BLOOD CULTURE, ROUTINE: NORMAL
BUN BLDV-MCNC: 16 MG/DL (ref 7–20)
CALCIUM SERPL-MCNC: 8.1 MG/DL (ref 8.3–10.6)
CHLORIDE BLD-SCNC: 101 MMOL/L (ref 99–110)
CO2: 24 MMOL/L (ref 21–32)
CREAT SERPL-MCNC: 1 MG/DL (ref 0.6–1.2)
CULTURE, BLOOD 2: NORMAL
EOSINOPHILS ABSOLUTE: 0.1 K/UL (ref 0–0.6)
EOSINOPHILS RELATIVE PERCENT: 2.6 %
GFR AFRICAN AMERICAN: >60
GFR NON-AFRICAN AMERICAN: 51
GLOBULIN: 2.9 G/DL
GLUCOSE BLD-MCNC: 95 MG/DL (ref 70–99)
HCT VFR BLD CALC: 26.1 % (ref 36–48)
HEMOGLOBIN: 8.7 G/DL (ref 12–16)
IRON SATURATION: 31 % (ref 15–50)
IRON: 65 UG/DL (ref 37–145)
LYMPHOCYTES ABSOLUTE: 0.5 K/UL (ref 1–5.1)
LYMPHOCYTES RELATIVE PERCENT: 14.9 %
MCH RBC QN AUTO: 31.5 PG (ref 26–34)
MCHC RBC AUTO-ENTMCNC: 33.4 G/DL (ref 31–36)
MCV RBC AUTO: 94.3 FL (ref 80–100)
MONOCYTES ABSOLUTE: 0.2 K/UL (ref 0–1.3)
MONOCYTES RELATIVE PERCENT: 7.4 %
NEUTROPHILS ABSOLUTE: 2.5 K/UL (ref 1.7–7.7)
NEUTROPHILS RELATIVE PERCENT: 74 %
PDW BLD-RTO: 19.3 % (ref 12.4–15.4)
PLATELET # BLD: 134 K/UL (ref 135–450)
PMV BLD AUTO: 7.6 FL (ref 5–10.5)
POTASSIUM REFLEX MAGNESIUM: 4.3 MMOL/L (ref 3.5–5.1)
RBC # BLD: 2.77 M/UL (ref 4–5.2)
SODIUM BLD-SCNC: 133 MMOL/L (ref 136–145)
TOTAL IRON BINDING CAPACITY: 210 UG/DL (ref 260–445)
TOTAL PROTEIN: 6.1 G/DL (ref 6.4–8.2)
WBC # BLD: 3.4 K/UL (ref 4–11)

## 2021-05-17 PROCEDURE — 1200000000 HC SEMI PRIVATE

## 2021-05-17 PROCEDURE — 99222 1ST HOSP IP/OBS MODERATE 55: CPT | Performed by: PSYCHIATRY & NEUROLOGY

## 2021-05-17 PROCEDURE — 85025 COMPLETE CBC W/AUTO DIFF WBC: CPT

## 2021-05-17 PROCEDURE — 80053 COMPREHEN METABOLIC PANEL: CPT

## 2021-05-17 PROCEDURE — 2580000003 HC RX 258: Performed by: INTERNAL MEDICINE

## 2021-05-17 PROCEDURE — G0378 HOSPITAL OBSERVATION PER HR: HCPCS

## 2021-05-17 PROCEDURE — 83550 IRON BINDING TEST: CPT

## 2021-05-17 PROCEDURE — 94761 N-INVAS EAR/PLS OXIMETRY MLT: CPT

## 2021-05-17 PROCEDURE — 6370000000 HC RX 637 (ALT 250 FOR IP): Performed by: INTERNAL MEDICINE

## 2021-05-17 PROCEDURE — 83540 ASSAY OF IRON: CPT

## 2021-05-17 PROCEDURE — 2700000000 HC OXYGEN THERAPY PER DAY

## 2021-05-17 PROCEDURE — 36415 COLL VENOUS BLD VENIPUNCTURE: CPT

## 2021-05-17 RX ORDER — METHOCARBAMOL 500 MG/1
500 TABLET, FILM COATED ORAL 4 TIMES DAILY
Status: DISCONTINUED | OUTPATIENT
Start: 2021-05-17 | End: 2021-05-25 | Stop reason: HOSPADM

## 2021-05-17 RX ADMIN — METOPROLOL TARTRATE 50 MG: 50 TABLET, FILM COATED ORAL at 20:32

## 2021-05-17 RX ADMIN — Medication 10 ML: at 20:33

## 2021-05-17 RX ADMIN — TRAMADOL HYDROCHLORIDE 50 MG: 50 TABLET, FILM COATED ORAL at 09:28

## 2021-05-17 RX ADMIN — DILTIAZEM HYDROCHLORIDE 300 MG: 300 CAPSULE, EXTENDED RELEASE ORAL at 09:25

## 2021-05-17 RX ADMIN — Medication 10 ML: at 09:26

## 2021-05-17 RX ADMIN — AMOXICILLIN AND CLAVULANATE POTASSIUM 1 TABLET: 500; 125 TABLET, FILM COATED ORAL at 16:27

## 2021-05-17 RX ADMIN — TRAMADOL HYDROCHLORIDE 50 MG: 50 TABLET, FILM COATED ORAL at 20:32

## 2021-05-17 RX ADMIN — METHOCARBAMOL TABLETS 500 MG: 500 TABLET, COATED ORAL at 12:14

## 2021-05-17 RX ADMIN — AMOXICILLIN AND CLAVULANATE POTASSIUM 1 TABLET: 500; 125 TABLET, FILM COATED ORAL at 09:28

## 2021-05-17 RX ADMIN — METOPROLOL TARTRATE 50 MG: 50 TABLET, FILM COATED ORAL at 09:26

## 2021-05-17 RX ADMIN — ROPINIROLE HYDROCHLORIDE 0.25 MG: 0.5 TABLET, FILM COATED ORAL at 20:32

## 2021-05-17 RX ADMIN — METHOCARBAMOL TABLETS 500 MG: 500 TABLET, COATED ORAL at 16:27

## 2021-05-17 RX ADMIN — CLOPIDOGREL BISULFATE 75 MG: 75 TABLET ORAL at 09:26

## 2021-05-17 RX ADMIN — LEVOTHYROXINE SODIUM 112 MCG: 0.11 TABLET ORAL at 05:59

## 2021-05-17 RX ADMIN — Medication 1000 UNITS: at 09:26

## 2021-05-17 RX ADMIN — DOXAZOSIN 2 MG: 2 TABLET ORAL at 09:26

## 2021-05-17 RX ADMIN — PRAVASTATIN SODIUM 40 MG: 40 TABLET ORAL at 09:25

## 2021-05-17 RX ADMIN — METHOCARBAMOL TABLETS 500 MG: 500 TABLET, COATED ORAL at 20:32

## 2021-05-17 ASSESSMENT — PAIN SCALES - GENERAL
PAINLEVEL_OUTOF10: 5
PAINLEVEL_OUTOF10: 5
PAINLEVEL_OUTOF10: 10

## 2021-05-17 NOTE — CARE COORDINATION
CASE MANAGEMENT INITIAL ASSESSMENT      Reviewed chart and completed assessment with:patient and daughter at bedside  Explained Case Management role/services. Primary contact information:see below  Health Care Decision Maker :   Primary Decision Maker: Norberto Mcdaniel - [phone]    Secondary Decision Maker: Arias Duncan - Zaheer - [phone]      Can this person be reached and be able to respond quickly, such as within a few minutes or hours? Yes    Admit date/status:obs  Diagnosis:Compression fracture of thoracic spine, non-traumatic, sequela   Is this a Readmission?:  Yes, pt was IPTA, had no needs last admission     Insurance:BCBS Medicare   Precert required for SNF: Yes       3 night stay required: No    Living arrangements, Adls, care needs, prior to admission:Pt lives in a ranch home alone w/family support. Family had been staying with her prior to admission    [address] at home:  none    Services in the home and/or outpatient, prior to [address] Notification (HEN):not initiated    Barriers to discharge:none    Plan/comments:Pt w/compression fracture, waiting on MD clearance to work w/PT/OT, possible w/brace. Pt on 2L O2, none at home, given IS. Pt is open to [facility], tanya works for company, unsure of name, will find out. Pt is open to SNF, if needed.   Frank Cronin RN       [facility] on chart for MD signature

## 2021-05-17 NOTE — CONSULTS
In patient Neurology consult        [facility] Neurology      MD Kevin Topete  1926    Date of Service: 2021    Referring Physician: Dorothy Ballard MD      Reason for the consult and CC: Acute syncope versus seizure    History was obtained from chart review and, discussion with the patient and her family. HPI:   The patient is a 80y.o.  years old female with multiple medical problems and history of breast cancer, chronic kidney disease, hypertension and hypothyroid who was admitted to the hospital yesterday with acute syncopal episode. Symptoms started few hours prior to admission. She went to the commode and had significant nausea and vomiting followed by brief LOC for at least 30 to 40 seconds. Patient's family witnessed brief upper extremity jerking for few seconds but no postictal state or tongue biting. No aura or warning. Degree was severe. No falling or trauma or injury. No other relieving or aggravating factors. No clear triggers. She came to the ED for evaluation after calling 911. Initial work-up with neuroimaging was unremarkable. She was admitted. Today she denies any new symptom except for thoracic compression fracture pain. Other review of system was unremarkable.       Family History   Problem Relation Age of Onset    Heart Disease Mother     High Blood Pressure Mother     High Blood Pressure Father     Stroke Father     Rheum Arthritis Sister     Cancer Sister      Past Surgical History:   Procedure Laterality Date    BLADDER SUSPENSION      BREAST LUMPECTOMY Left 08/21/15    CATARACT REMOVAL      CHOLECYSTECTOMY      HYSTERECTOMY          Past Medical History:   Diagnosis Date    Cancer ([address]     breast    Cataract     High blood pressure     Thyroid disorder      Social History     Tobacco Use    Smoking status: Former Smoker     Quit date: 1989     Years since quittin.3    Smokeless tobacco: Never Used   Substance Use Topics    Alcohol use: No     Alcohol/week: 0.0 standard drinks     Comment: 1 beer every other week    Drug use: No     Allergies   Allergen Reactions    Morphine And Related Shortness Of Breath     Other reaction(s): Hallucinations  palpitations & diaphoresis      Nitrofurantoin Shortness Of Breath    Ciprofloxacin Diarrhea    Codeine Other (See Comments)     HALLUCINATIONS    Sympathomimetics Other (See Comments)     sudafed = 'jumpy'     Current Facility-Administered Medications   Medication Dose Route Frequency Provider Last Rate Last Admin    methocarbamol (ROBAXIN) tablet 500 mg  500 mg Oral 4x Daily Staci Camejo MD   500 mg at 05/17/21 1214    sodium chloride flush 0.9 % injection 10 mL  10 mL Intravenous 2 times per day Linda Clap SCOTTY Chandan, DO   10 mL at 05/17/21 0926    sodium chloride flush 0.9 % injection 10 mL  10 mL Intravenous PRN Hermila Rosenbergzi, DO        0.9 % sodium chloride infusion  25 mL Intravenous PRN Linda Rosenbergzi, DO        potassium chloride 10 mEq/100 mL IVPB (Peripheral Line)  10 mEq Intravenous PRN Rachel Hawley, DO        magnesium sulfate 2000 mg in 50 mL IVPB premix  2,000 mg Intravenous PRN Linda Clap SCOTTY Rosenbergzi, DO        promethazine (PHENERGAN) tablet 12.5 mg  12.5 mg Oral Q6H PRN Lyubovd SCOTTY Rosenbergzi, DO        Or    ondansetron (ZOFRAN) injection 4 mg  4 mg Intravenous Q6H PRN Pedro Luismad A Chandan, DO        famotidine (PEPCID) tablet 20 mg  20 mg Oral Daily PRN Rachel Rosenbergzi, DO        acetaminophen (TYLENOL) tablet 650 mg  650 mg Oral Q6H PRN Lyubovd SCOTTY Chandan, DO        Or    acetaminophen (TYLENOL) suppository 650 mg  650 mg Rectal Q6H PRN Pedro Luismad A Chandan, DO        amoxicillin-clavulanate (AUGMENTIN) 500-125 MG per tablet 1 tablet  1 tablet Oral BID Ahmad SCOTTY Rosenbergzi, DO   1 tablet at 05/17/21 0928    clopidogrel (PLAVIX) tablet 75 mg  75 mg Oral Daily Ahmad SCOTTY Rosenbergzi, DO   75 mg at 05/17/21 0926    dilTIAZem (CARDIZEM CD) extended release capsule 300 mg  300 mg Oral Daily Lyubovd A Chandan, DO   300 mg at 05/17/21 0925    levothyroxine (SYNTHROID) tablet 112 mcg  112 mcg Oral Daily [facility] SCOTTY BullardChandan, DO   112 mcg at 05/17/21 0559    metoprolol tartrate (LOPRESSOR) tablet 50 mg  50 mg Oral BID [facility], DO   50 mg at 05/17/21 0926    pravastatin (PRAVACHOL) tablet 40 mg  40 mg Oral Daily [facility] Chandan, DO   40 mg at 05/17/21 0925    rOPINIRole (REQUIP) tablet 0.25 mg  0.25 mg Oral [facility]  Chandan, DO   0.25 mg at 05/16/21 2322    doxazosin (CARDURA) tablet 2 mg  2 mg Oral Daily [facility] Chandan, DO   2 mg at 05/17/21 4008    traMADol (ULTRAM) tablet 50 mg  50 mg Oral Q8H PRN [facility] Haw[facility], DO   50 mg at 05/17/21 3591    Vitamin D (CHOLECALCIFEROL) tablet 1,000 Units  1,000 Units Oral Daily [facility] SCOTTY Rosenbergzi, DO   1,000 Units at 05/17/21 1016    labetalol (NORMODYNE;TRANDATE) injection 10 mg  10 mg Intravenous Q4H PRN [facility] SCOTTY BullardChandan, DO           ROS : A 10-14 system review of constitutional, cardiovascular, respiratory, eyes, musculoskeletal, endocrine, GI, ENT, skin, hematological, genitourinary, psychiatric and neurologic systems was obtained and updated today and is unremarkable except as mentioned in my HPI      Exam:     Constitutional:   Vitals:    05/17/21 0424 05/17/21 0459 05/17/21 0759 05/17/21 1146   BP:   (!) 159/73 130/66   Pulse:  58 63 57   Resp:   18 18   Temp:   97.9 °F (36.6 °C)    TempSrc:   Oral    SpO2:   99% 97%   Weight: 155 lb 9.6 oz (70.6 kg)      Height:           General appearance:  Normal development and appear in no acute distress. Eye:  Fundus of the eye: Funduscopic examination cannot be performed due to COVID19 restrictions and precautions. Neck: supple  Cardiovascular: No lower leg edema with good pulsation. Mental Status:   Oriented to person, place, problem, and time. Memory: Poor immediate recall. Intact remote memory  Normal attention span and concentration.   Language: intact naming, repeating and fluency   Poor fund of pressure medications  Telemetry  PT and OT  At some point she will need MRI of the brain if the decision to continue with aggressive measures regarding her cancer history versus palliative. I do not feel she will tplerate an MRI at this point giving her thoracic compression pain  Falling precaution  Discussed with her family  I do not feel EEG would change our management at this point unless symptoms recur  Can be discharged once medically stable  Please call for questions. Thank you for referring such patient. If you have any questions regarding my consult note, please don't hesitate to call me. Paola Ware MD  [phone]    This dictation was generated by voice recognition computer software.  Although all attempts are made to edit the dictation for accuracy, there may be errors in the  transcription that are not intended

## 2021-05-17 NOTE — PLAN OF CARE
Problem: Falls - Risk of:  Goal: Absence of physical injury  Description: Absence of physical injury  5/17/2021 1013 by Campos Keller RN  Outcome: Ongoing  Note: Pt will remain free from falls throughout hospital stay. Fall precautions in place, bed alarm on, bed in lowest position with wheels locked and side rails 2/4 up. Room door open and hourly rounding completed. Will continue to monitor throughout shift. Problem: Skin Integrity:  Goal: Absence of new skin breakdown  Description: Absence of new skin breakdown  5/17/2021 1013 by Campos Keller RN  Outcome: Ongoing  Note: Pt is at risk for skin breakdown. Pt will have skin assessments every shift, Q2 turns, heels elevated off of the bed, and friction and shear prevented when possible. Will continue to monitor for signs of skin breakdown and enforce prevention measures. Problem: Pain:  Goal: Control of chronic pain  Description: Control of chronic pain  5/17/2021 1013 by Campos Keller RN  Outcome: Ongoing  Note: Pt will be satisfied with pain control. Pt uses numeric pain rating scale with reassessments after pain med administration. Will continue to monitor progression throughout shift.

## 2021-05-17 NOTE — PROGRESS NOTES
4 Eyes Skin Assessment     The patient is being assess for  Admission    I agree that 2 RN's have performed a thorough Head to Toe Skin Assessment on the patient. ALL assessment sites listed below have been assessed. Areas assessed by both nurses:   [x]   Head, Face, and Ears   [x]   Shoulders, Back, and Chest  [x]   Arms, Elbows, and Hands   [x]   Coccyx, Sacrum, and Ischum  [x]   Legs, Feet, and Heels        Does the Patient have Skin Breakdown?   No         Herbie Prevention initiated:  yes  Wound Care Orders initiated:  No      [facility] nurse consulted for Pressure Injury (Stage 3,4, Unstageable, DTI, NWPT, and Complex wounds):  No      Nurse 1 eSignature: Electronically signed by Meeta Shell RN on 5/16/21 at 10:45 PM EDT    **SHARE this note so that the co-signing nurse is able to place an eSignature**    Nurse 2 eSignature: Electronically signed by Carly Ureña RN on 5/16/21 at 11:09 PM EDT

## 2021-05-17 NOTE — H&P
Hospital Medicine History & Physical      PCP: Jono Vasques MD    Date of Admission: 5/16/2021    Date of Service: Pt seen/examined on 5/16/2021  Pt seen/examined face to face on and admitted as observation with expected LOS less than two midnights but that can change depending on respose to medical therapy and clinical progress. Chief Complaint:    Chief Complaint   Patient presents with    Seizures     witnessed Shaking episode \"possible seizure\" per granddaughter. Pt states she never lost consciousness Recent admission here for pneumonia    Nausea        History Of Present Illness:      80 y.o. female who presented to [facility] with past medical history of stage III recurrent breast cancer left status post chemotherapy, hypertension, CKD stage III, hypothyroidism, baseline dementia presented to the ED after episodes of shaking that was witnessed by granddaughter who is a nurse. Patient reported that she remembers the episode where she felt like she is passing out but she did not pass out and was in the bathroom per niece patient was given tramadol and took a nap for an hour when she woke up she wanted to use the methadone that she helped her to use the bathroom. Patient sat down on the toilet after from standing and then scooted back in (the patient did not feel good after position change. Patient started shaking the whole body and her eyes opened with upper extremity flexed and lower extremity extended lasting for 30 minutes and patient was not responding verbally. 911 was called at the scene. Patient regained consciousness lasting for seconds. I also spoke with daughter reported the patient is DNR CC and has been having fatigue that worsened this past week and was sent back home so they can take care of her. Patient continues to be compliant with medications for recent bacterial pneumonia taking Augmentin for 7-day course.   In regards to patient's breast cancer she continues to be (PRAVACHOL) 40 MG tablet Take 40 mg by mouth daily  2/6/15   Historical Provider, MD   metoprolol tartrate (LOPRESSOR) 25 MG tablet Take 50 mg by mouth 2 times daily  2/6/15   Historical Provider, MD   Cholecalciferol (VITAMIN D PO) Take by mouth daily     Historical Provider, MD   Calcium Carbonate (CALTRATE 600 PO) Take 1 tablet by mouth daily     Historical Provider, MD       Allergies:  Morphine and related, Nitrofurantoin, Ciprofloxacin, Codeine, and Sympathomimetics    Social History:          TOBACCO:   reports that she quit smoking about 32 years ago. She has never used smokeless tobacco.  ETOH:   reports no history of alcohol use. E-Cigarettes/Vaping Use     Questions Responses    E-Cigarette/Vaping Use     Start Date     Passive Exposure     Quit Date     Counseling Given     Comments             Family History:      Reviewed in detail         Problem Relation Age of Onset    Heart Disease Mother     High Blood Pressure Mother     High Blood Pressure Father     Stroke Father     Rheum Arthritis Sister     Cancer Sister        REVIEW OF SYSTEMS:     Constitutional:  No Fever, No Chills, No Night Sweats  ENT/Mouth:  No Nasal Congestion,  No Hoarseness, No new mouth lesion  Eyes:  No Eye Pain, No Redness, No Discharge  Cardiovascular:  No Chest Pain, No Orthopnea, No Palpitations  Respiratory:  No Cough, No Sputum, No Dyspnea  Gastrointestinal: No Vomiting, No Diarrhea, No abdominal pain  Genitourinary: No Urinary Frequency, No Hematuria, No Urinary pain  Musculoskeletal:  + worsening Arthralgias, + worsening Myalgias  Skin:  No new Skin Lesions, No new skin rash  Neuro:  No new weakness, No new numbness.   Psych:  No suicial ideation, No Violence ideation    PHYSICAL EXAM PERFORMED:    BP (!) 175/73   Pulse 73   Temp 96.9 °F (36.1 °C) (Axillary)   Resp 18   Ht 5' 2\" (1.575 m)   Wt 154 lb (69.9 kg)   SpO2 96%   BMI 28.17 kg/m²     General appearance:  mild acute distress, appears older than

## 2021-05-17 NOTE — PROGRESS NOTES
Physical Therapy/Occupational Therapy    Chart review complete.  Per chart review and conversation with RN, hold therapy eval until Neurosurgery consult is completed/    Brendan Mann, PT, DPT   Wolf Pritchard, OT

## 2021-05-17 NOTE — PROGRESS NOTES
Rales/Wheezes/Rhonchi. Cardiovascular: Regular rate and rhythm with normal S1/S2 without murmurs, rubs or gallops. Abdomen: Soft, non-tender, non-distended with normal bowel sounds. Musculoskeletal: No clubbing, cyanosis or edema bilaterally. Full range of motion without deformity. Skin: Skin color, texture, turgor normal.  No rashes or lesions. Neurologic:  Neurovascularly intact without any focal sensory/motor deficits. Cranial nerves: II-XII intact, grossly non-focal.  Psychiatric: Alert and oriented, thought content appropriate, normal insight  Capillary Refill: Brisk,3 seconds, normal   Peripheral Pulses: +2 palpable, equal bilaterally       Labs:   Recent Labs     05/15/21  0915 05/16/21  1343 05/17/21  0727   WBC 4.0 4.1 3.4*   HGB 10.9* 9.8* 8.7*   HCT 33.3* 28.7* 26.1*    145 134*     Recent Labs     05/15/21  0915 05/16/21  1343 05/17/21  0726   * 128* 133*   K 4.3 4.0 4.3    94* 101   CO2 22 22 24   BUN 22* 21* 16   CREATININE 1.4* 1.2 1.0   CALCIUM 8.6 8.7 8.1*     Recent Labs     05/16/21  1343 05/17/21  0726   AST 22 18   ALT 13 10   BILITOT 0.3 0.3   ALKPHOS 81 70     No results for input(s): INR in the last 72 hours. Recent Labs     05/16/21  1343   TROPONINI 0.02*       Urinalysis:      Lab Results   Component Value Date    NITRU Negative 05/16/2021    WBCUA 3-5 05/16/2021    BACTERIA 1+ 05/16/2021    RBCUA 0-2 05/16/2021    BLOODU Negative 05/16/2021    SPECGRAV >=1.030 05/16/2021    GLUCOSEU Negative 05/16/2021       Radiology:  CT CHEST PULMONARY EMBOLISM W CONTRAST   Final Result   1. No pulmonary embolism. 2. Cardiomegaly with a small right pleural effusion and mild interstitial   edema in the lung bases. 3. Small scattered bilateral pulmonary nodules without significant change   from 03/05/2021 measuring up to approximately 6 mm. 4. Unchanged mediastinal and right hilar lymphadenopathy. Stable to mildly   improved left axillary lymphadenopathy.    5. Moderate T8 vertebral body compression fracture new from 03/05/2021 with 5   mm retropulsion. If clinically indicated further evaluation could be   obtained with MRI. RECOMMENDATIONS:   Fleischner Society guidelines for follow-up and management of incidentally   detected pulmonary nodules:      Multiple Solid Nodules:      Nodule size less than 6 mm   In a high-risk patient, optional CT at 12 months. - Low risk patients include individuals with minimal or absent history of   smoking and other known risk factors. - High risk patients include individuals with a history or smoking or known   risk factors. Radiology 2017 http://pubs. rsna.org/doi/full/10.1148/radiol. 5388403624         CT HEAD WO CONTRAST   Final Result   No acute intracranial abnormality. Mild paranasal and left mastoid air cell disease. XR CHEST PORTABLE   Final Result   No acute process. Improved aeration right lung base. Assessment/Plan:    Active Hospital Problems    Diagnosis     Syncope and collapse [R55]     Compression fracture of body of thoracic vertebra (HCC) [S22.000A]     Generalized weakness [R53.1]     HTN (hypertension), benign [I10]     Compression fracture of thoracic spine, non-traumatic, sequela [M48.54XS]      PLAN:      Acute compression fracture of the thoracic spine:  No identifiable traumatic condition noted. Last imaging was on 03/2021  Palliative care consulted  Deferred any surgical procedures to be done  Spine surgery consulted. Patient to be seen later today. Continue analgesic treatment.     Presyncope:  Consistent with vasovagal.  Seen by neurology. Nothing further recommended except that at some point MRI of the brain may be needed. Awaiting resolution of the back pain caused by thoracic spine fracture for MRI of the brain can be performed. Does not seem to be orthostatic.     Previously diagnosed bacterial pneumonia  Continue Augmentin    Chronic kidney disease stage

## 2021-05-18 LAB
A/G RATIO: 1.1 (ref 1.1–2.2)
ALBUMIN SERPL-MCNC: 3.1 G/DL (ref 3.4–5)
ALP BLD-CCNC: 78 U/L (ref 40–129)
ALT SERPL-CCNC: 9 U/L (ref 10–40)
ANION GAP SERPL CALCULATED.3IONS-SCNC: 7 MMOL/L (ref 3–16)
ANISOCYTOSIS: ABNORMAL
AST SERPL-CCNC: 18 U/L (ref 15–37)
BANDED NEUTROPHILS RELATIVE PERCENT: 3 % (ref 0–7)
BASOPHILS ABSOLUTE: 0 K/UL (ref 0–0.2)
BASOPHILS RELATIVE PERCENT: 0 %
BILIRUB SERPL-MCNC: 0.3 MG/DL (ref 0–1)
BUN BLDV-MCNC: 15 MG/DL (ref 7–20)
CALCIUM SERPL-MCNC: 8.1 MG/DL (ref 8.3–10.6)
CHLORIDE BLD-SCNC: 99 MMOL/L (ref 99–110)
CO2: 23 MMOL/L (ref 21–32)
CREAT SERPL-MCNC: 1.3 MG/DL (ref 0.6–1.2)
EOSINOPHILS ABSOLUTE: 0.2 K/UL (ref 0–0.6)
EOSINOPHILS RELATIVE PERCENT: 6 %
GFR AFRICAN AMERICAN: 46
GFR NON-AFRICAN AMERICAN: 38
GLOBULIN: 2.8 G/DL
GLUCOSE BLD-MCNC: 91 MG/DL (ref 70–99)
HCT VFR BLD CALC: 26.2 % (ref 36–48)
HEMOGLOBIN: 8.8 G/DL (ref 12–16)
LYMPHOCYTES ABSOLUTE: 0.6 K/UL (ref 1–5.1)
LYMPHOCYTES RELATIVE PERCENT: 17 %
MACROCYTES: ABNORMAL
MCH RBC QN AUTO: 31.6 PG (ref 26–34)
MCHC RBC AUTO-ENTMCNC: 33.7 G/DL (ref 31–36)
MCV RBC AUTO: 94 FL (ref 80–100)
MICROCYTES: ABNORMAL
MONOCYTES ABSOLUTE: 0.2 K/UL (ref 0–1.3)
MONOCYTES RELATIVE PERCENT: 5 %
NEUTROPHILS ABSOLUTE: 2.7 K/UL (ref 1.7–7.7)
NEUTROPHILS RELATIVE PERCENT: 69 %
OVALOCYTES: ABNORMAL
PDW BLD-RTO: 19.4 % (ref 12.4–15.4)
PLATELET # BLD: 138 K/UL (ref 135–450)
PMV BLD AUTO: 7.6 FL (ref 5–10.5)
POTASSIUM REFLEX MAGNESIUM: 4.4 MMOL/L (ref 3.5–5.1)
RBC # BLD: 2.78 M/UL (ref 4–5.2)
SODIUM BLD-SCNC: 129 MMOL/L (ref 136–145)
TOTAL PROTEIN: 5.9 G/DL (ref 6.4–8.2)
WBC # BLD: 3.8 K/UL (ref 4–11)

## 2021-05-18 PROCEDURE — 97110 THERAPEUTIC EXERCISES: CPT

## 2021-05-18 PROCEDURE — 2580000003 HC RX 258: Performed by: INTERNAL MEDICINE

## 2021-05-18 PROCEDURE — 36415 COLL VENOUS BLD VENIPUNCTURE: CPT

## 2021-05-18 PROCEDURE — 94761 N-INVAS EAR/PLS OXIMETRY MLT: CPT

## 2021-05-18 PROCEDURE — 80053 COMPREHEN METABOLIC PANEL: CPT

## 2021-05-18 PROCEDURE — 97116 GAIT TRAINING THERAPY: CPT

## 2021-05-18 PROCEDURE — 6370000000 HC RX 637 (ALT 250 FOR IP): Performed by: INTERNAL MEDICINE

## 2021-05-18 PROCEDURE — 85025 COMPLETE CBC W/AUTO DIFF WBC: CPT

## 2021-05-18 PROCEDURE — 97162 PT EVAL MOD COMPLEX 30 MIN: CPT

## 2021-05-18 PROCEDURE — 1200000000 HC SEMI PRIVATE

## 2021-05-18 PROCEDURE — G0378 HOSPITAL OBSERVATION PER HR: HCPCS

## 2021-05-18 PROCEDURE — 2700000000 HC OXYGEN THERAPY PER DAY

## 2021-05-18 RX ORDER — HYDROCODONE BITARTRATE AND ACETAMINOPHEN 5; 325 MG/1; MG/1
1 TABLET ORAL EVERY 6 HOURS PRN
Status: DISCONTINUED | OUTPATIENT
Start: 2021-05-18 | End: 2021-05-25 | Stop reason: HOSPADM

## 2021-05-18 RX ORDER — GABAPENTIN 100 MG/1
100 CAPSULE ORAL 3 TIMES DAILY
Status: DISCONTINUED | OUTPATIENT
Start: 2021-05-18 | End: 2021-05-25 | Stop reason: HOSPADM

## 2021-05-18 RX ORDER — SODIUM CHLORIDE 9 MG/ML
INJECTION, SOLUTION INTRAVENOUS CONTINUOUS
Status: DISCONTINUED | OUTPATIENT
Start: 2021-05-18 | End: 2021-05-21

## 2021-05-18 RX ORDER — LIDOCAINE 4 G/G
1 PATCH TOPICAL DAILY
Status: DISCONTINUED | OUTPATIENT
Start: 2021-05-18 | End: 2021-05-25 | Stop reason: HOSPADM

## 2021-05-18 RX ADMIN — Medication 1000 UNITS: at 09:22

## 2021-05-18 RX ADMIN — HYDROCODONE BITARTRATE AND ACETAMINOPHEN 1 TABLET: 5; 325 TABLET ORAL at 20:22

## 2021-05-18 RX ADMIN — PRAVASTATIN SODIUM 40 MG: 40 TABLET ORAL at 09:22

## 2021-05-18 RX ADMIN — GABAPENTIN 100 MG: 100 CAPSULE ORAL at 14:15

## 2021-05-18 RX ADMIN — TRAMADOL HYDROCHLORIDE 50 MG: 50 TABLET, FILM COATED ORAL at 06:38

## 2021-05-18 RX ADMIN — METHOCARBAMOL TABLETS 500 MG: 500 TABLET, COATED ORAL at 12:46

## 2021-05-18 RX ADMIN — GABAPENTIN 100 MG: 100 CAPSULE ORAL at 20:22

## 2021-05-18 RX ADMIN — LEVOTHYROXINE SODIUM 112 MCG: 0.11 TABLET ORAL at 05:07

## 2021-05-18 RX ADMIN — DOXAZOSIN 2 MG: 2 TABLET ORAL at 09:21

## 2021-05-18 RX ADMIN — HYDROCODONE BITARTRATE AND ACETAMINOPHEN 1 TABLET: 5; 325 TABLET ORAL at 14:34

## 2021-05-18 RX ADMIN — METHOCARBAMOL TABLETS 500 MG: 500 TABLET, COATED ORAL at 17:05

## 2021-05-18 RX ADMIN — AMOXICILLIN AND CLAVULANATE POTASSIUM 1 TABLET: 500; 125 TABLET, FILM COATED ORAL at 09:21

## 2021-05-18 RX ADMIN — ROPINIROLE HYDROCHLORIDE 0.25 MG: 0.5 TABLET, FILM COATED ORAL at 20:21

## 2021-05-18 RX ADMIN — METHOCARBAMOL TABLETS 500 MG: 500 TABLET, COATED ORAL at 20:21

## 2021-05-18 RX ADMIN — METHOCARBAMOL TABLETS 500 MG: 500 TABLET, COATED ORAL at 09:21

## 2021-05-18 RX ADMIN — SODIUM CHLORIDE: 9 INJECTION, SOLUTION INTRAVENOUS at 12:46

## 2021-05-18 RX ADMIN — CLOPIDOGREL BISULFATE 75 MG: 75 TABLET ORAL at 09:22

## 2021-05-18 RX ADMIN — METOPROLOL TARTRATE 50 MG: 50 TABLET, FILM COATED ORAL at 20:21

## 2021-05-18 ASSESSMENT — PAIN DESCRIPTION - DESCRIPTORS: DESCRIPTORS: SPASM

## 2021-05-18 ASSESSMENT — PAIN SCALES - GENERAL
PAINLEVEL_OUTOF10: 8
PAINLEVEL_OUTOF10: 5
PAINLEVEL_OUTOF10: 5
PAINLEVEL_OUTOF10: 8
PAINLEVEL_OUTOF10: 0
PAINLEVEL_OUTOF10: 0
PAINLEVEL_OUTOF10: 8

## 2021-05-18 ASSESSMENT — PAIN DESCRIPTION - PROGRESSION: CLINICAL_PROGRESSION: NOT CHANGED

## 2021-05-18 ASSESSMENT — PAIN DESCRIPTION - FREQUENCY
FREQUENCY: INTERMITTENT

## 2021-05-18 ASSESSMENT — PAIN DESCRIPTION - ORIENTATION
ORIENTATION: LEFT
ORIENTATION: LEFT

## 2021-05-18 ASSESSMENT — PAIN DESCRIPTION - PAIN TYPE: TYPE: ACUTE PAIN

## 2021-05-18 ASSESSMENT — PAIN DESCRIPTION - ONSET: ONSET: GRADUAL

## 2021-05-18 ASSESSMENT — PAIN - FUNCTIONAL ASSESSMENT
PAIN_FUNCTIONAL_ASSESSMENT: PREVENTS OR INTERFERES WITH ALL ACTIVE AND SOME PASSIVE ACTIVITIES
PAIN_FUNCTIONAL_ASSESSMENT: PREVENTS OR INTERFERES WITH MANY ACTIVE NOT PASSIVE ACTIVITIES
PAIN_FUNCTIONAL_ASSESSMENT: PREVENTS OR INTERFERES WITH MANY ACTIVE NOT PASSIVE ACTIVITIES

## 2021-05-18 ASSESSMENT — PAIN DESCRIPTION - LOCATION: LOCATION: BACK

## 2021-05-18 NOTE — CONSULTS
Palliative Care Initial Note  Palliative Care Admit date:  5/18/21  Reason for c/s:  GOC, Code status    Advance Directives:  Pt has executed AD's and designated her son, Fabio Singh, as her healthcare proxy and dtr, Adeel Green, as the first alternate. They will provide copies. Pt has a DNR-CC code status & will need a physician-signed copy of the [facility] DNR. Plan of care/goals:  Spoke w/ [facility] [place] via phone, Sierra Muhammad is apparently out of town at this time. She has a good understanding of current issues and that we are awaiting f/u from [address] [place] [address]. [facility] did shared \"since this is a disc issue, we would be open to some procedures, if mom were just put into a twilight and not put under general anesthesia. \"   If therapy were to recommend SNF level of care, family would also be inclined to follow that course w/ the hope of eventual return to home. Social/Spiritual:  PTA, pt lived independently @ home. If pt goes home w/ HHC, dtr would want to use [address] where her nephew, Natalie Villafuerte works; Arya's phone is [phone]. Plan: Will ask hospitalist to sign an [place] DNR for home use.   Therapy recommended home w/ 24hr supervision and HHC/PT    Symptom Assessment:         Symptoms       Present Y/N          Medications    Doses in last          24 hours    Pain C/o \"aching\" or \"spasm\" back pain, rated 10/0-10 @ worst.  Tramadol 50mg Q8 PRN    Robaxin 500mg QID              3              Scheduled     N/V      Date of last BM                5/16      Anxiety/agitation       Depression       SOB          On R/A      Appetite       Sleep       Performance Status              Reason for consult:  _X_ Advance Care Planning  ___ Transition of Care Planning  ___ Psychosocial/Spiritual Support  ___ Symptom Management                                                                                                                                          Tracy Harris RN

## 2021-05-18 NOTE — PROGRESS NOTES
Consult called to [address] [place] [address] Neurosurgery again at [phone] on 5/18/2021   Nurse made aware.  Daniella Valenzuela  PCA

## 2021-05-18 NOTE — PROGRESS NOTES
gallops. Abdomen: Soft, non-tender, non-distended with normal bowel sounds. Musculoskeletal: No clubbing, cyanosis or edema bilaterally. Full range of motion without deformity. Skin: Skin color, texture, turgor normal.  No rashes or lesions. Neurologic:  Neurovascularly intact without any focal sensory/motor deficits. Cranial nerves: II-XII intact, grossly non-focal.  Psychiatric: Alert and oriented, thought content appropriate, normal insight      Labs:   Recent Labs     05/16/21  1343 05/17/21  0727 05/18/21  0610   WBC 4.1 3.4* 3.8*   HGB 9.8* 8.7* 8.8*   HCT 28.7* 26.1* 26.2*    134* 138     Recent Labs     05/16/21  1343 05/17/21  0726 05/18/21  0610   * 133* 129*   K 4.0 4.3 4.4   CL 94* 101 99   CO2 22 24 23   BUN 21* 16 15   CREATININE 1.2 1.0 1.3*   CALCIUM 8.7 8.1* 8.1*     Recent Labs     05/16/21  1343 05/17/21  0726 05/18/21  0610   AST 22 18 18   ALT 13 10 9*   BILITOT 0.3 0.3 0.3   ALKPHOS 81 70 78     No results for input(s): INR in the last 72 hours. Recent Labs     05/16/21  1343   TROPONINI 0.02*       Urinalysis:      Lab Results   Component Value Date    NITRU Negative 05/16/2021    WBCUA 3-5 05/16/2021    BACTERIA 1+ 05/16/2021    RBCUA 0-2 05/16/2021    BLOODU Negative 05/16/2021    SPECGRAV >=1.030 05/16/2021    GLUCOSEU Negative 05/16/2021       Radiology:  CT CHEST PULMONARY EMBOLISM W CONTRAST   Final Result   1. No pulmonary embolism. 2. Cardiomegaly with a small right pleural effusion and mild interstitial   edema in the lung bases. 3. Small scattered bilateral pulmonary nodules without significant change   from 03/05/2021 measuring up to approximately 6 mm. 4. Unchanged mediastinal and right hilar lymphadenopathy. Stable to mildly   improved left axillary lymphadenopathy. 5. Moderate T8 vertebral body compression fracture new from 03/05/2021 with 5   mm retropulsion. If clinically indicated further evaluation could be   obtained with MRI. RECOMMENDATIONS:   Fleischner Society guidelines for follow-up and management of incidentally   detected pulmonary nodules:      Multiple Solid Nodules:      Nodule size less than 6 mm   In a high-risk patient, optional CT at 12 months. - Low risk patients include individuals with minimal or absent history of   smoking and other known risk factors. - High risk patients include individuals with a history or smoking or known   risk factors. Radiology 2017 http://pubs. rsna.org/doi/full/10.1148/radiol. 8773964926         CT HEAD WO CONTRAST   Final Result   No acute intracranial abnormality. Mild paranasal and left mastoid air cell disease. XR CHEST PORTABLE   Final Result   No acute process. Improved aeration right lung base. Assessment/Plan:    Active Hospital Problems    Diagnosis     Syncope and collapse [R55]     Compression fracture of body of thoracic vertebra (HCC) [S22.000A]     Generalized weakness [R53.1]     HTN (hypertension), benign [I10]     Compression fracture of thoracic spine, non-traumatic, sequela [M48.54XS]        Acute compression fracture of T8-non traumatic.  -Palliative care consulted  -Spine surgery consulted. Essentially bracing v kyphoplasty. Patient wants kyphoplasty.  -Continue pain control. Added gabapentin, lidocaine patch and norco.     Presyncope:Consistent with vasovagal.?seizure. CT head nonacute. Not orthostatic on vitals.  -Patient was seen by neurology. MRI to be considered at some point given history of malignancy. EEG unlikely to . Previously diagnosed bacterial pneumonia  CT chest does not show pneumonia  -DC Augmentin    Chronic kidney disease stage III [baseline creatinine 1.3-1.6]  -Renal function activity stable    Anemia-consistent with chronic disease  -Hgb stable. Patient will need OLIVIA outpatient.     Hyponatremia-possibly hypovolemic  -Start IVF    Stage III left breast cancer, recurrent--on palbociclib 75 mg and monthly fulvestrant and trastuzumab    Follows with oncologist at [facility]    Pulmonary nodules and mediastinal lymphadenopathy  -Pt to follow up with Oncology. Informed family of the findings. Family present at bedside. Questions and concerns were addressed    DVT Prophylaxis: Holding anticoagulation pending spine surgery consult.   Diet: DIET GENERAL;  Code Status: DNR-CC    PT/OT Eval Status: ordered    Dispo -DC once sodium improved and pending decision on plan for vertebral fractures    Jamal Kent MD

## 2021-05-18 NOTE — PROGRESS NOTES
Report given to [facility]. Call light and bedside table within reach. No needs voiced at this time. Bed in lowest position, brakes locked. Bed alarm on and in place.

## 2021-05-18 NOTE — PROGRESS NOTES
Mayte Rodriguez at [address] [place] [address] is supposed to have Dr Hussein Price call [facility] RN back for a patient update, per drs request.     Jeremie Broderick PCA

## 2021-05-18 NOTE — CONSULTS
Neurosurgery Consult Note    IP CONSULT TO HOSPITALIST  IP CONSULT TO NEUROLOGY  IP CONSULT TO PALLIATIVE CARE  IP CONSULT TO SPINE    Subjective:  CHIEF COMPLAINT / HPI:  Allyson Nageotte is a 80 y.o. female admitted for   Chief Complaint   Patient presents with    Seizures     witnessed Shaking episode \"possible seizure\" per granddaughter. Pt states she never lost consciousness Recent admission here for pneumonia    Nausea     81 y/o woman with mid/lower back pain since last week when she was admitted for pneumonia. Went home and had shaking episode. CT at that time demonstrated a T8 compression fracture new since March. History of breast CA on medical management. Currently she is walking in the room with a walker but with significant back pain. Minimal pain when lying still. No leg complaints. Seen today with her granddaughter.        ALLERGIES:  Allergies   Allergen Reactions    Morphine And Related Shortness Of Breath     Other reaction(s): Hallucinations  palpitations & diaphoresis      Nitrofurantoin Shortness Of Breath    Ciprofloxacin Diarrhea    Codeine Other (See Comments)     HALLUCINATIONS    Sympathomimetics Other (See Comments)     sudafed = 'jumpy'        CURRENT MEDS:  Current Facility-Administered Medications: 0.9 % sodium chloride infusion, , Intravenous, Continuous  methocarbamol (ROBAXIN) tablet 500 mg, 500 mg, Oral, 4x Daily  sodium chloride flush 0.9 % injection 10 mL, 10 mL, Intravenous, 2 times per day  sodium chloride flush 0.9 % injection 10 mL, 10 mL, Intravenous, PRN  0.9 % sodium chloride infusion, 25 mL, Intravenous, PRN  potassium chloride 10 mEq/100 mL IVPB (Peripheral Line), 10 mEq, Intravenous, PRN  magnesium sulfate 2000 mg in 50 mL IVPB premix, 2,000 mg, Intravenous, PRN  promethazine (PHENERGAN) tablet 12.5 mg, 12.5 mg, Oral, Q6H PRN **OR** ondansetron (ZOFRAN) injection 4 mg, 4 mg, Intravenous, Q6H PRN  famotidine (PEPCID) tablet 20 mg, 20 mg, Oral, Daily PRN  acetaminophen (TYLENOL) tablet 650 mg, 650 mg, Oral, Q6H PRN **OR** acetaminophen (TYLENOL) suppository 650 mg, 650 mg, Rectal, Q6H PRN  clopidogrel (PLAVIX) tablet 75 mg, 75 mg, Oral, Daily  dilTIAZem (CARDIZEM CD) extended release capsule 300 mg, 300 mg, Oral, Daily  levothyroxine (SYNTHROID) tablet 112 mcg, 112 mcg, Oral, Daily  metoprolol tartrate (LOPRESSOR) tablet 50 mg, 50 mg, Oral, BID  pravastatin (PRAVACHOL) tablet 40 mg, 40 mg, Oral, Daily  rOPINIRole (REQUIP) tablet 0.25 mg, 0.25 mg, Oral, Nightly  doxazosin (CARDURA) tablet 2 mg, 2 mg, Oral, Daily  traMADol (ULTRAM) tablet 50 mg, 50 mg, Oral, Q8H PRN  Vitamin D (CHOLECALCIFEROL) tablet 1,000 Units, 1,000 Units, Oral, Daily  labetalol (NORMODYNE;TRANDATE) injection 10 mg, 10 mg, Intravenous, Q4H PRN    PAST MEDICAL HISTORY:  Past Medical History:   Diagnosis Date    Cancer ([facility] [address]     breast    Cataract     High blood pressure     Thyroid disorder         PAST SURGICAL HISTORY:   has a past surgical history that includes Hysterectomy; Cholecystectomy; Cataract removal; bladder suspension (2000); and Breast lumpectomy (Left, 08/21/15). SOCIAL HISTORY:   reports that she quit smoking about 32 years ago. She has never used smokeless tobacco. She reports that she does not drink alcohol and does not use drugs. FAMILY HISTORY:  Family History   Problem Relation Age of Onset    Heart Disease Mother     High Blood Pressure Mother     High Blood Pressure Father     Stroke Father     Rheum Arthritis Sister     Cancer Sister        REVIEW OF SYSTEMS:  See HPI.      PHYSICAL EXAMINATION:  VITALS:  BP (!) 117/51   Pulse 64   Temp 97.6 °F (36.4 °C) (Oral)   Resp 12   Ht 5' 2\" (1.575 m)   Wt 153 lb 14.4 oz (69.8 kg)   SpO2 93%   BMI 28.15 kg/m²   Gen: elderly female, NAD  Neuro: AAO speech clear and fluent   BLE strength symmetric and appropriate, sensation intact    Tender to palpation lower thoracic spine on left    LABORATORY DATA:   CBC: Lab Results   Component Value Date    WBC 3.8 05/18/2021    HGB 8.8 05/18/2021    HCT 26.2 05/18/2021    MCV 94.0 05/18/2021     05/18/2021     BMP:   Lab Results   Component Value Date     05/18/2021    K 4.4 05/18/2021    CL 99 05/18/2021    CO2 23 05/18/2021    BUN 15 05/18/2021    CREATININE 1.3 05/18/2021    CALCIUM 8.1 05/18/2021     PT/INR: No results found for: PROTIME, INR  APTT: No results found for: APTT    IMAGING STUDIES:           CT HEAD WO CONTRAST    Result Date: 5/16/2021  EXAMINATION: CT OF THE HEAD WITHOUT CONTRAST  5/16/2021 2:10 pm TECHNIQUE: CT of the head was performed without the administration of intravenous contrast. Dose modulation, iterative reconstruction, and/or weight based adjustment of the mA/kV was utilized to reduce the radiation dose to as low as reasonably achievable. COMPARISON: None. HISTORY: ORDERING SYSTEM PROVIDED HISTORY: possible seizure TECHNOLOGIST PROVIDED HISTORY: Has a \"code stroke\" or \"stroke alert\" been called? ->No Reason for exam:->possible seizure Decision Support Exception - unselect if not a suspected or confirmed emergency medical condition->Emergency Medical Condition (MA) Reason for Exam: got sick to stomach and almost passed out Acuity: Acute Type of Exam: Initial FINDINGS: BRAIN/VENTRICLES: Mild diffuse atrophy again noted not unexpected for age. No suspicious areas of low attenuation appreciated within the brain parenchyma. Areas of low attenuation compatible with small vessel ischemic change again noted. No acute intracranial hemorrhage or mass effect is noted. ORBITS: The visualized portion of the orbits demonstrate no acute abnormality. SINUSES: Air-fluid level within the left sphenoid air cells noted. Visualized paranasal sinuses are otherwise unremarkable. Partial opacification noted of the left mastoid air cells. SOFT TISSUES/SKULL:  No acute abnormality of the visualized skull or soft tissues.      No acute intracranial abnormality. Mild paranasal and left mastoid air cell disease. CT CHEST PULMONARY EMBOLISM W CONTRAST    Result Date: 5/16/2021  EXAMINATION: CTA OF THE CHEST 5/16/2021 5:07 pm TECHNIQUE: CTA of the chest was performed after the administration of intravenous contrast.  Multiplanar reformatted images are provided for review. MIP images are provided for review. Dose modulation, iterative reconstruction, and/or weight based adjustment of the mA/kV was utilized to reduce the radiation dose to as low as reasonably achievable. COMPARISON: Chest radiograph 05/16/2021. PET-CT 03/05/2021. HISTORY: ORDERING SYSTEM PROVIDED HISTORY: hypoxia [facility] syncope TECHNOLOGIST PROVIDED HISTORY: Reason for exam:->hypoxia [facility] syncope Decision Support Exception - unselect if not a suspected or confirmed emergency medical condition->Emergency Medical Condition (MA) Reason for Exam: shortness of breath,hypoxic Acuity: Acute Type of Exam: Initial Additional signs and symptoms: hx of breast cancer Relevant Medical/Surgical History: pt unable to raise her arms for scan FINDINGS: Pulmonary Arteries: Pulmonary arteries are adequately opacified for evaluation. No evidence of intraluminal filling defect to suggest pulmonary embolism. Main pulmonary artery is normal in caliber. Mediastinum: The thoracic aorta is normal in caliber with mild to moderate atherosclerotic vascular disease. Coronary artery atherosclerotic vascular calcifications are seen. No acute abnormality in the branch vessels of the superior mediastinum and lower neck. Moderate cardiomegaly. No pericardial effusion. The mediastinal esophagus and thyroid gland are unremarkable. Mediastinal and right hilar lymphadenopathy without significant change from 03/05/2021. Lungs/pleura: The central airways are patent. Small right pleural effusion. No left pleural effusion. No pneumothorax. Mild emphysematous changes. Mild bilateral dependent atelectasis.   Mild smooth bilateral interlobular septal thickening most pronounced in the lung bases. No consolidation. 6 mm nodule in the anterior segment of the right upper lobe on image 58 series 3 without significant change. 3 mm nodule in the right lung apex on image 25 not significantly changed. 5 mm nodule in the right middle lobe on image 54 not significantly changed. 5 mm nodule in the lingula on image 65 without significant change. Several other scattered small pulmonary nodules without significant change. No new pulmonary nodule identified. Upper Abdomen: Limited images through the upper abdomen are unremarkable. Soft Tissues/Bones: Left axillary lymphadenopathy stable to mildly decreased in size compared with 03/05/2021. Surgical clips in the left breast.  There is a moderate T8 vertebral body compression fracture new from 03/05/2021 with up to approximately 5 mm retropulsion. 1. No pulmonary embolism. 2. Cardiomegaly with a small right pleural effusion and mild interstitial edema in the lung bases. 3. Small scattered bilateral pulmonary nodules without significant change from 03/05/2021 measuring up to approximately 6 mm. 4. Unchanged mediastinal and right hilar lymphadenopathy. Stable to mildly improved left axillary lymphadenopathy. 5. Moderate T8 vertebral body compression fracture new from 03/05/2021 with 5 mm retropulsion. If clinically indicated further evaluation could be obtained with MRI. RECOMMENDATIONS: Fleischner Society guidelines for follow-up and management of incidentally detected pulmonary nodules: Multiple Solid Nodules: Nodule size less than 6 mm In a high-risk patient, optional CT at 12 months. - Low risk patients include individuals with minimal or absent history of smoking and other known risk factors. - High risk patients include individuals with a history or smoking or known risk factors. Radiology 2017 http://pubs. rsna.org/doi/full/10.1148/radiol. 5268097616       IMPRESSION/PLAN:  T8 compression fracture without associated trauma. Based on CT does not appear to be related to a tumor. Discussed above with patient and family and reviewed recommendations. Essentially bracing v kyphoplasty. Reviewed both. She is leaning towards kyphoplasty to more rapidly resolve her pain and improve her stability in order to not lose further function. This appears reasonable especially with her advanced age, the associated immobility of the brace may start and downward decline which is not reversible. Will await their decision. Ok to mobilize in the meantime. Thank you again for this consultation.     Caron Chatman MD  5/18/2021

## 2021-05-18 NOTE — PROGRESS NOTES
Occupational Therapy  Failed Attempt: Neurosurgery consult complete; note indicate \"ok to mobilize\". Pt asleep, supine in bed upon OT arrival. Family requesting that therapy hold at this time to allow pt to rest. Family and RN report that pt was struggling with high levels of pain today and was just able to get comfortable enough to rest. Will hold per family request and follow up another date as pt is appropriate.      Sebas Munroe, OTR/L [id number]

## 2021-05-19 LAB
ANION GAP SERPL CALCULATED.3IONS-SCNC: 5 MMOL/L (ref 3–16)
BUN BLDV-MCNC: 13 MG/DL (ref 7–20)
CALCIUM SERPL-MCNC: 8.3 MG/DL (ref 8.3–10.6)
CHLORIDE BLD-SCNC: 102 MMOL/L (ref 99–110)
CO2: 24 MMOL/L (ref 21–32)
CREAT SERPL-MCNC: 1 MG/DL (ref 0.6–1.2)
GFR AFRICAN AMERICAN: >60
GFR NON-AFRICAN AMERICAN: 51
GLUCOSE BLD-MCNC: 103 MG/DL (ref 70–99)
HCT VFR BLD CALC: 26.7 % (ref 36–48)
HEMOGLOBIN: 9.1 G/DL (ref 12–16)
MCH RBC QN AUTO: 32.1 PG (ref 26–34)
MCHC RBC AUTO-ENTMCNC: 34.2 G/DL (ref 31–36)
MCV RBC AUTO: 94.1 FL (ref 80–100)
PDW BLD-RTO: 19.2 % (ref 12.4–15.4)
PLATELET # BLD: 144 K/UL (ref 135–450)
PMV BLD AUTO: 7.5 FL (ref 5–10.5)
POTASSIUM REFLEX MAGNESIUM: 4.8 MMOL/L (ref 3.5–5.1)
RBC # BLD: 2.84 M/UL (ref 4–5.2)
SODIUM BLD-SCNC: 131 MMOL/L (ref 136–145)
WBC # BLD: 4.7 K/UL (ref 4–11)

## 2021-05-19 PROCEDURE — G0378 HOSPITAL OBSERVATION PER HR: HCPCS

## 2021-05-19 PROCEDURE — 6370000000 HC RX 637 (ALT 250 FOR IP): Performed by: INTERNAL MEDICINE

## 2021-05-19 PROCEDURE — 97166 OT EVAL MOD COMPLEX 45 MIN: CPT

## 2021-05-19 PROCEDURE — 2700000000 HC OXYGEN THERAPY PER DAY

## 2021-05-19 PROCEDURE — 36415 COLL VENOUS BLD VENIPUNCTURE: CPT

## 2021-05-19 PROCEDURE — 2580000003 HC RX 258: Performed by: INTERNAL MEDICINE

## 2021-05-19 PROCEDURE — 97535 SELF CARE MNGMENT TRAINING: CPT

## 2021-05-19 PROCEDURE — 97530 THERAPEUTIC ACTIVITIES: CPT

## 2021-05-19 PROCEDURE — 85027 COMPLETE CBC AUTOMATED: CPT

## 2021-05-19 PROCEDURE — 1200000000 HC SEMI PRIVATE

## 2021-05-19 PROCEDURE — 94761 N-INVAS EAR/PLS OXIMETRY MLT: CPT

## 2021-05-19 PROCEDURE — 80048 BASIC METABOLIC PNL TOTAL CA: CPT

## 2021-05-19 RX ORDER — FAMOTIDINE 20 MG/1
10 TABLET, FILM COATED ORAL DAILY PRN
Status: DISPENSED | OUTPATIENT
Start: 2021-05-19 | End: 2021-05-21

## 2021-05-19 RX ORDER — NEOMYCIN SULFATE, POLYMYXIN B SULFATE, HYDROCORTISONE 3.5; 10000; 1 MG/ML; [USP'U]/ML; MG/ML
1 SOLUTION/ DROPS AURICULAR (OTIC) EVERY 6 HOURS SCHEDULED
Status: DISCONTINUED | OUTPATIENT
Start: 2021-05-19 | End: 2021-05-25 | Stop reason: HOSPADM

## 2021-05-19 RX ADMIN — NEOMYCIN SULFATE, POLYMYXIN B SULFATE, HYDROCORTISONE 1 DROP: 3.5; 10000; 1 SOLUTION/ DROPS AURICULAR (OTIC) at 18:59

## 2021-05-19 RX ADMIN — PRAVASTATIN SODIUM 40 MG: 40 TABLET ORAL at 10:26

## 2021-05-19 RX ADMIN — HYDROCODONE BITARTRATE AND ACETAMINOPHEN 1 TABLET: 5; 325 TABLET ORAL at 21:01

## 2021-05-19 RX ADMIN — ROPINIROLE HYDROCHLORIDE 0.25 MG: 0.5 TABLET, FILM COATED ORAL at 21:01

## 2021-05-19 RX ADMIN — METOPROLOL TARTRATE 50 MG: 50 TABLET, FILM COATED ORAL at 21:01

## 2021-05-19 RX ADMIN — METHOCARBAMOL TABLETS 500 MG: 500 TABLET, COATED ORAL at 10:25

## 2021-05-19 RX ADMIN — GABAPENTIN 100 MG: 100 CAPSULE ORAL at 21:01

## 2021-05-19 RX ADMIN — NEOMYCIN SULFATE, POLYMYXIN B SULFATE, HYDROCORTISONE 1 DROP: 3.5; 10000; 1 SOLUTION/ DROPS AURICULAR (OTIC) at 23:58

## 2021-05-19 RX ADMIN — GABAPENTIN 100 MG: 100 CAPSULE ORAL at 10:25

## 2021-05-19 RX ADMIN — CLOPIDOGREL BISULFATE 75 MG: 75 TABLET ORAL at 10:26

## 2021-05-19 RX ADMIN — GABAPENTIN 100 MG: 100 CAPSULE ORAL at 14:25

## 2021-05-19 RX ADMIN — SODIUM CHLORIDE: 9 INJECTION, SOLUTION INTRAVENOUS at 19:45

## 2021-05-19 RX ADMIN — Medication 1000 UNITS: at 10:25

## 2021-05-19 RX ADMIN — FAMOTIDINE 10 MG: 20 TABLET, FILM COATED ORAL at 10:24

## 2021-05-19 RX ADMIN — DOXAZOSIN 2 MG: 2 TABLET ORAL at 10:25

## 2021-05-19 RX ADMIN — METHOCARBAMOL TABLETS 500 MG: 500 TABLET, COATED ORAL at 21:01

## 2021-05-19 RX ADMIN — METHOCARBAMOL TABLETS 500 MG: 500 TABLET, COATED ORAL at 17:59

## 2021-05-19 RX ADMIN — METOPROLOL TARTRATE 50 MG: 50 TABLET, FILM COATED ORAL at 10:25

## 2021-05-19 RX ADMIN — LEVOTHYROXINE SODIUM 112 MCG: 0.11 TABLET ORAL at 05:58

## 2021-05-19 RX ADMIN — DILTIAZEM HYDROCHLORIDE 300 MG: 300 CAPSULE, EXTENDED RELEASE ORAL at 10:26

## 2021-05-19 RX ADMIN — METHOCARBAMOL TABLETS 500 MG: 500 TABLET, COATED ORAL at 14:26

## 2021-05-19 RX ADMIN — SODIUM CHLORIDE: 9 INJECTION, SOLUTION INTRAVENOUS at 05:58

## 2021-05-19 ASSESSMENT — PAIN SCALES - GENERAL: PAINLEVEL_OUTOF10: 4

## 2021-05-19 ASSESSMENT — PAIN DESCRIPTION - LOCATION: LOCATION: BACK

## 2021-05-19 ASSESSMENT — PAIN DESCRIPTION - ORIENTATION: ORIENTATION: LEFT

## 2021-05-19 NOTE — PROGRESS NOTES
VSS - afebrile. Pt is alert and oriented x 4 with no history of falls. Assessment completed as charted. Bed is in lowest position with 2/4 bed rails raised, bed alarm turned on, wheels locked and call light within reach - patient wearing non-skid socks and verbalizes understanding to call out for assistance. No further requests at this time. Will continue to monitor.      Vitals:    05/18/21 2013   BP: (!) 152/67   Pulse: 72   Resp: 18   Temp: 97.8 °F (36.6 °C)   SpO2: 96%

## 2021-05-19 NOTE — PROGRESS NOTES
Hospitalist Progress Note      PCP: Berna Lombardo MD    Date of Admission: 5/16/2021    Hospital Course: 79yo F who is admitted after having a presyncope episode on the toilet seat. Description sounds like vasovagal.  Was evaluated by neurology. MRI of the brain may be needed sometime down the road. EEG not recommended. Subjective:   Patient has better control of back pain. She is lying comfortable in bed. Medications:  Reviewed    Infusion Medications    sodium chloride 75 mL/hr at 05/19/21 0558    sodium chloride       Scheduled Medications    gabapentin  100 mg Oral TID    lidocaine  1 patch Transdermal Daily    methocarbamol  500 mg Oral 4x Daily    sodium chloride flush  10 mL Intravenous 2 times per day    clopidogrel  75 mg Oral Daily    dilTIAZem  300 mg Oral Daily    levothyroxine  112 mcg Oral Daily    metoprolol tartrate  50 mg Oral BID    pravastatin  40 mg Oral Daily    rOPINIRole  0.25 mg Oral Nightly    doxazosin  2 mg Oral Daily    Vitamin D  1,000 Units Oral Daily     PRN Meds: famotidine, HYDROcodone 5 mg - acetaminophen, sodium chloride flush, sodium chloride, potassium chloride, magnesium sulfate, promethazine **OR** ondansetron, acetaminophen **OR** acetaminophen, labetalol      Intake/Output Summary (Last 24 hours) at 5/19/2021 1153  Last data filed at 5/19/2021 0931  Gross per 24 hour   Intake 2030 ml   Output 1100 ml   Net 930 ml       Physical Exam Performed:    BP (!) 166/69   Pulse 74   Temp 98 °F (36.7 °C) (Oral)   Resp 16   Ht 5' 2\" (1.575 m)   Wt 156 lb 1.6 oz (70.8 kg)   SpO2 96%   BMI 28.55 kg/m²     General appearance: No apparent distress, appears stated age and cooperative. on nasal 02  HEENT: Pupils equal, round, and reactive to light. Conjunctivae/corneas clear. Neck: Supple, with full range of motion. No jugular venous distention. Trachea midline. Respiratory:  Normal respiratory effort.  Clear to auscultation, bilaterally without Rales/Wheezes/Rhonchi. Cardiovascular: Regular rate and rhythm with normal S1/S2 without murmurs, rubs or gallops. Abdomen: Soft, non-tender, non-distended with normal bowel sounds. Musculoskeletal: No clubbing, cyanosis or edema bilaterally. Full range of motion without deformity. Skin: Skin color, texture, turgor normal.  No rashes or lesions. Neurologic:  Neurovascularly intact without any focal sensory/motor deficits. Cranial nerves: II-XII intact, grossly non-focal.  Psychiatric: Alert and oriented, thought content appropriate, normal insight      Labs:   Recent Labs     05/16/21  1343 05/17/21  0727 05/18/21  0610   WBC 4.1 3.4* 3.8*   HGB 9.8* 8.7* 8.8*   HCT 28.7* 26.1* 26.2*    134* 138     Recent Labs     05/16/21  1343 05/17/21  0726 05/18/21  0610   * 133* 129*   K 4.0 4.3 4.4   CL 94* 101 99   CO2 22 24 23   BUN 21* 16 15   CREATININE 1.2 1.0 1.3*   CALCIUM 8.7 8.1* 8.1*     Recent Labs     05/16/21  1343 05/17/21  0726 05/18/21  0610   AST 22 18 18   ALT 13 10 9*   BILITOT 0.3 0.3 0.3   ALKPHOS 81 70 78     No results for input(s): INR in the last 72 hours. Recent Labs     05/16/21  1343   TROPONINI 0.02*       Urinalysis:      Lab Results   Component Value Date    NITRU Negative 05/16/2021    WBCUA 3-5 05/16/2021    BACTERIA 1+ 05/16/2021    RBCUA 0-2 05/16/2021    BLOODU Negative 05/16/2021    SPECGRAV >=1.030 05/16/2021    GLUCOSEU Negative 05/16/2021       Radiology:  CT CHEST PULMONARY EMBOLISM W CONTRAST   Final Result   1. No pulmonary embolism. 2. Cardiomegaly with a small right pleural effusion and mild interstitial   edema in the lung bases. 3. Small scattered bilateral pulmonary nodules without significant change   from 03/05/2021 measuring up to approximately 6 mm. 4. Unchanged mediastinal and right hilar lymphadenopathy. Stable to mildly   improved left axillary lymphadenopathy.    5. Moderate T8 vertebral body compression fracture new from 03/05/2021 with 5   mm retropulsion. If clinically indicated further evaluation could be   obtained with MRI. RECOMMENDATIONS:   Fleischner Society guidelines for follow-up and management of incidentally   detected pulmonary nodules:      Multiple Solid Nodules:      Nodule size less than 6 mm   In a high-risk patient, optional CT at 12 months. - Low risk patients include individuals with minimal or absent history of   smoking and other known risk factors. - High risk patients include individuals with a history or smoking or known   risk factors. Radiology 2017 http://pubs. rsna.org/doi/full/10.1148/radiol. 4878483924         CT HEAD WO CONTRAST   Final Result   No acute intracranial abnormality. Mild paranasal and left mastoid air cell disease. XR CHEST PORTABLE   Final Result   No acute process. Improved aeration right lung base. Assessment/Plan:    Active Hospital Problems    Diagnosis     Syncope and collapse [R55]     Compression fracture of body of thoracic vertebra (HCC) [S22.000A]     Generalized weakness [R53.1]     HTN (hypertension), benign [I10]     Compression fracture of thoracic spine, non-traumatic, sequela [M48.54XS]        Acute compression fracture of T8-non traumatic.  -Palliative care consulted  -Spine surgery consulted. Essentially bracing v kyphoplasty. Patient wants kyphoplasty.  -Continue pain control. continue gabapentin, lidocaine patch and norco.     Presyncope:Consistent with vasovagal.?seizure. CT head nonacute. Not orthostatic on vitals.  -Patient was seen by neurology. MRI to be considered at some point given history of malignancy. EEG unlikely to . Previously diagnosed bacterial pneumonia  CT chest does not show pneumonia  -Discontinued antibiotic on 5/18/2021    Chronic kidney disease stage III [baseline creatinine 1.3-1.6]  -Renal function activity stable. Await lab work.     Anemia-consistent with chronic disease  -Hgb stable. Patient will need OLIVIA outpatient. Hyponatremia-possibly hypovolemic  -Continue IVF. Improved. Hypertension  -BP elevated likely due to pain, control pain    Stage III left breast cancer, recurrent--on palbociclib 75 mg and monthly fulvestrant and trastuzumab    Follow[facility]    Pulmonary nodules and mediastinal lymphadenopathy  -Pt to follow up with Oncology. Informed family of the findings. Left Ear ache  -Not able to assess. Empiric otic abx. DVT Prophylaxis: Holding anticoagulation pending spine surgery consult.   Diet: DIET GENERAL;  Code Status: DNR-CC    PT/OT Eval Status: ordered    [address] pending kyphoplasty    Jamal Kent MD

## 2021-05-19 NOTE — PROGRESS NOTES
Occupational Therapy   Occupational Therapy Initial Assessment and treatment    Date: 2021   Patient Name: Neymar Ohara  MRN: [id number]     : 1926    Date of Service: 2021    Discharge Recommendations:  24 hour supervision or assist, Home with Home health OT    If pt discharges prior to next session, this note will serve as discharge summary. See case management note for discharge disposition. Assessment   Performance deficits / Impairments: Decreased functional mobility ; Decreased ADL status; Decreased strength;Decreased safe awareness;Decreased cognition;Decreased endurance;Decreased balance  Assessment: Pt admitted with T8 compression fx, presents with above deficits and would benefit from skilled OT to promote return to baseline. Pt normally lives alone and reports she was independent PTA, per CM note, family was staying with pt PTA, recommend home with 24 hr SPV/assist at discharge. Prognosis: Fair  OT Education: OT Role;Plan of Care;ADL Adaptive Strategies;Transfer Training  Patient Education: disease specific: log roll tech, OT role, safety wtih transfers  REQUIRES OT FOLLOW UP: Yes  Activity Tolerance  Activity Tolerance: Patient limited by pain; Patient limited by fatigue  Activity Tolerance: pt fatigues easily with min activity 166/69 HR 74, O2 96% on 2L, drops to 86% on RA when going to restroom, rebounds with PLB  Safety Devices  Safety Devices in place: Yes  Type of devices: Gait belt;Bed alarm in place; Left in bed;Call light within reach           Patient Diagnosis(es): The primary encounter diagnosis was Compression fracture of body of thoracic vertebra ([address] Diagnoses of Syncope and collapse and Generalized weakness were also pertinent to this visit. has a past medical history of Cancer ([address] Cataract, High blood pressure, and Thyroid disorder. has a past surgical history that includes Hysterectomy; Cholecystectomy;  Cataract removal; bladder suspension (); and toilet  Toilet Transfer: Minimal assistance  ADL  Feeding: Supervision;Setup  Grooming: Supervision  LE Dressing: Maximum assistance  Toileting: Maximum assistance (clothing mgmt and posterior waqas care after BM, pt able to wipe front)  Additional Comments: pt impulsive, reports needing to use restroom, needs cues for safety and assist for line mgmt, pt holding onto walls, doors, etc to get to restroom and declines to use RW  Tone RUE  RUE Tone: Normotonic  Tone LUE  LUE Tone: Normotonic  Coordination  Movements Are Fluid And Coordinated: Yes        Transfers  Sit to stand: Contact guard assistance  Stand to sit: Contact guard assistance     Cognition  Overall Cognitive Status: Exceptions  Arousal/Alertness: Delayed responses to stimuli  Following Commands: Follows one step commands with repetition; Follows one step commands with increased time  Attention Span: Attends with cues to redirect  Memory: Decreased short term memory  Problem Solving: Decreased awareness of errors  Insights: Decreased awareness of deficits  Initiation: Requires cues for some  Sequencing: Requires cues for some                 LUE AROM (degrees)  LUE AROM : WFL  Left Hand AROM (degrees)  Left Hand AROM: WFL  RUE AROM (degrees)  RUE AROM : WFL  Right Hand AROM (degrees)  Right Hand AROM: WFL  LUE Strength  Gross LUE Strength: WFL  RUE Strength  Gross RUE Strength: WFL                   Plan   Plan  Times per week: 3-5x/wk      AM-PAC Score        AM-[facility] Inpatient Daily Activity Raw Score: 14 (05/19/21 0944)  AM-PAC Inpatient ADL T-Scale Score : 33.39 (05/19/21 0944)  ADL Inpatient CMS 0-100% Score: 59.67 (05/19/21 0944)  ADL Inpatient CMS G-Code Modifier : CK (05/19/21 0944)    Goals  Short term goals  Time Frame for Short term goals: 1 week by 5/26  Short term goal 1: Pt will complete LB dressing with min A or less  Short term goal 2: Pt will complete toileting with CGA  Short term goal 3: Pt will tolerate standing at sink for 2-3 grooming tasks  Short term goal 4: Pt will tolerate B UE ex x 20 reps w/o fatigue  Patient Goals   Patient goals :  \"Go home\"       Therapy Time   Individual Concurrent Group Co-treatment   Time In 6879         Time Out 0930         Minutes 33         Timed Code Treatment Minutes: 23 Minutes (10 min eval)       Jude Keating, OT

## 2021-05-19 NOTE — CARE COORDINATION
Family wanting to move forward w/kypho. CM will continue to follow for needs and will reassess plan pending updated PT/OT recs post op.   Mila Marr RN

## 2021-05-20 ENCOUNTER — APPOINTMENT (OUTPATIENT)
Dept: MRI IMAGING | Age: 86
DRG: 515 | End: 2021-05-20
Payer: MEDICARE

## 2021-05-20 LAB
ANION GAP SERPL CALCULATED.3IONS-SCNC: 5 MMOL/L (ref 3–16)
BUN BLDV-MCNC: 11 MG/DL (ref 7–20)
CALCIUM SERPL-MCNC: 8.1 MG/DL (ref 8.3–10.6)
CHLORIDE BLD-SCNC: 104 MMOL/L (ref 99–110)
CO2: 23 MMOL/L (ref 21–32)
CREAT SERPL-MCNC: 1 MG/DL (ref 0.6–1.2)
GFR AFRICAN AMERICAN: >60
GFR NON-AFRICAN AMERICAN: 51
GLUCOSE BLD-MCNC: 100 MG/DL (ref 70–99)
HCT VFR BLD CALC: 25.5 % (ref 36–48)
HEMOGLOBIN: 8.7 G/DL (ref 12–16)
MCH RBC QN AUTO: 32.6 PG (ref 26–34)
MCHC RBC AUTO-ENTMCNC: 34.1 G/DL (ref 31–36)
MCV RBC AUTO: 95.6 FL (ref 80–100)
PDW BLD-RTO: 19.9 % (ref 12.4–15.4)
PLATELET # BLD: 144 K/UL (ref 135–450)
PMV BLD AUTO: 7.4 FL (ref 5–10.5)
POTASSIUM REFLEX MAGNESIUM: 4.8 MMOL/L (ref 3.5–5.1)
RBC # BLD: 2.66 M/UL (ref 4–5.2)
SODIUM BLD-SCNC: 132 MMOL/L (ref 136–145)
WBC # BLD: 4.4 K/UL (ref 4–11)

## 2021-05-20 PROCEDURE — 2580000003 HC RX 258: Performed by: INTERNAL MEDICINE

## 2021-05-20 PROCEDURE — 2700000000 HC OXYGEN THERAPY PER DAY

## 2021-05-20 PROCEDURE — 1200000000 HC SEMI PRIVATE

## 2021-05-20 PROCEDURE — 80048 BASIC METABOLIC PNL TOTAL CA: CPT

## 2021-05-20 PROCEDURE — 97110 THERAPEUTIC EXERCISES: CPT

## 2021-05-20 PROCEDURE — 97535 SELF CARE MNGMENT TRAINING: CPT

## 2021-05-20 PROCEDURE — 6370000000 HC RX 637 (ALT 250 FOR IP): Performed by: INTERNAL MEDICINE

## 2021-05-20 PROCEDURE — 94761 N-INVAS EAR/PLS OXIMETRY MLT: CPT

## 2021-05-20 PROCEDURE — 6360000004 HC RX CONTRAST MEDICATION: Performed by: REGISTERED NURSE

## 2021-05-20 PROCEDURE — A9579 GAD-BASE MR CONTRAST NOS,1ML: HCPCS | Performed by: REGISTERED NURSE

## 2021-05-20 PROCEDURE — 70553 MRI BRAIN STEM W/O & W/DYE: CPT

## 2021-05-20 PROCEDURE — 36415 COLL VENOUS BLD VENIPUNCTURE: CPT

## 2021-05-20 PROCEDURE — 72146 MRI CHEST SPINE W/O DYE: CPT

## 2021-05-20 PROCEDURE — 85027 COMPLETE CBC AUTOMATED: CPT

## 2021-05-20 RX ADMIN — HYDROCODONE BITARTRATE AND ACETAMINOPHEN 1 TABLET: 5; 325 TABLET ORAL at 22:48

## 2021-05-20 RX ADMIN — GADOTERIDOL 13 ML: 279.3 INJECTION, SOLUTION INTRAVENOUS at 15:44

## 2021-05-20 RX ADMIN — METHOCARBAMOL TABLETS 500 MG: 500 TABLET, COATED ORAL at 22:48

## 2021-05-20 RX ADMIN — METHOCARBAMOL TABLETS 500 MG: 500 TABLET, COATED ORAL at 17:55

## 2021-05-20 RX ADMIN — METHOCARBAMOL TABLETS 500 MG: 500 TABLET, COATED ORAL at 09:50

## 2021-05-20 RX ADMIN — GABAPENTIN 100 MG: 100 CAPSULE ORAL at 14:35

## 2021-05-20 RX ADMIN — NEOMYCIN SULFATE, POLYMYXIN B SULFATE, HYDROCORTISONE 1 DROP: 3.5; 10000; 1 SOLUTION/ DROPS AURICULAR (OTIC) at 12:39

## 2021-05-20 RX ADMIN — DOXAZOSIN 2 MG: 2 TABLET ORAL at 09:50

## 2021-05-20 RX ADMIN — GABAPENTIN 100 MG: 100 CAPSULE ORAL at 09:50

## 2021-05-20 RX ADMIN — NEOMYCIN SULFATE, POLYMYXIN B SULFATE, HYDROCORTISONE 1 DROP: 3.5; 10000; 1 SOLUTION/ DROPS AURICULAR (OTIC) at 22:49

## 2021-05-20 RX ADMIN — PRAVASTATIN SODIUM 40 MG: 40 TABLET ORAL at 09:50

## 2021-05-20 RX ADMIN — DILTIAZEM HYDROCHLORIDE 300 MG: 300 CAPSULE, EXTENDED RELEASE ORAL at 09:49

## 2021-05-20 RX ADMIN — SODIUM CHLORIDE: 9 INJECTION, SOLUTION INTRAVENOUS at 09:48

## 2021-05-20 RX ADMIN — NEOMYCIN SULFATE, POLYMYXIN B SULFATE, HYDROCORTISONE 1 DROP: 3.5; 10000; 1 SOLUTION/ DROPS AURICULAR (OTIC) at 06:00

## 2021-05-20 RX ADMIN — NEOMYCIN SULFATE, POLYMYXIN B SULFATE, HYDROCORTISONE 1 DROP: 3.5; 10000; 1 SOLUTION/ DROPS AURICULAR (OTIC) at 17:56

## 2021-05-20 RX ADMIN — ROPINIROLE HYDROCHLORIDE 0.25 MG: 0.5 TABLET, FILM COATED ORAL at 22:48

## 2021-05-20 RX ADMIN — Medication 1000 UNITS: at 09:49

## 2021-05-20 RX ADMIN — HYDROCODONE BITARTRATE AND ACETAMINOPHEN 1 TABLET: 5; 325 TABLET ORAL at 06:00

## 2021-05-20 RX ADMIN — GABAPENTIN 100 MG: 100 CAPSULE ORAL at 22:48

## 2021-05-20 RX ADMIN — METOPROLOL TARTRATE 50 MG: 50 TABLET, FILM COATED ORAL at 22:48

## 2021-05-20 RX ADMIN — Medication 10 ML: at 22:49

## 2021-05-20 RX ADMIN — METHOCARBAMOL TABLETS 500 MG: 500 TABLET, COATED ORAL at 14:35

## 2021-05-20 RX ADMIN — HYDROCODONE BITARTRATE AND ACETAMINOPHEN 1 TABLET: 5; 325 TABLET ORAL at 14:35

## 2021-05-20 RX ADMIN — METOPROLOL TARTRATE 50 MG: 50 TABLET, FILM COATED ORAL at 09:50

## 2021-05-20 RX ADMIN — LEVOTHYROXINE SODIUM 112 MCG: 0.11 TABLET ORAL at 06:00

## 2021-05-20 ASSESSMENT — PAIN SCALES - GENERAL
PAINLEVEL_OUTOF10: 5
PAINLEVEL_OUTOF10: 5

## 2021-05-20 ASSESSMENT — PAIN DESCRIPTION - PAIN TYPE: TYPE: ACUTE PAIN

## 2021-05-20 ASSESSMENT — PAIN DESCRIPTION - LOCATION: LOCATION: BACK

## 2021-05-20 ASSESSMENT — PAIN DESCRIPTION - DESCRIPTORS: DESCRIPTORS: SPASM

## 2021-05-20 NOTE — PLAN OF CARE
Problem: Falls - Risk of:  Goal: Will remain free from falls  Description: Will remain free from falls  Outcome: Ongoing  Note: Pt at risk for falls. Educated on fall precautions. Pt alert and oriented, calls out appropriately. Bed alarm active and audible. Non-skid socks on. Pt remains free from falls, will continue to monitor.

## 2021-05-20 NOTE — PROGRESS NOTES
Occupational Therapy  Facility/Department: [facility] A2 CARD TELEMETRY  Daily Treatment Note  NAME: Verito Wisdom  : 1926  MRN: [id number]    Date of Service: 2021    Discharge Recommendations:  24 hour supervision or assist, Home with Home health OT      If pt discharges prior to next session, this note will serve as discharge summary. See case management note for discharge disposition. Assessment   Performance deficits / Impairments: Decreased functional mobility ; Decreased ADL status; Decreased strength;Decreased safe awareness;Decreased cognition;Decreased endurance;Decreased balance  Assessment: Pt cooperative with encouragement, cont to fatigue easily with min activity. Pt declines OOB d/t fatigue and \"spasms\". Pt requires increased time for grooming tasks and UE ex with rest breaks, fatigues easily. RN reports pt to have MRI before kyphoplasty. COnt POC. OT Education: OT Role;Plan of Care;ADL Adaptive Strategies;Transfer Training  Patient Education: disease specific: log roll tech, OT role, UE ex  REQUIRES OT FOLLOW UP: Yes  Activity Tolerance  Activity Tolerance: Patient limited by pain; Patient limited by fatigue  Activity Tolerance: fatigues easily with min activity 136/50 HR 65 O2 92%  Safety Devices  Safety Devices in place: Yes  Type of devices: Gait belt;Bed alarm in place; Left in bed;Call light within reach         Patient Diagnosis(es): The primary encounter diagnosis was Compression fracture of body of thoracic vertebra ([address] Diagnoses of Syncope and collapse and Generalized weakness were also pertinent to this visit. has a past medical history of Cancer ([address] Cataract, High blood pressure, and Thyroid disorder. has a past surgical history that includes Hysterectomy; Cholecystectomy; Cataract removal; bladder suspension (); and Breast lumpectomy (Left, 08/21/15).     Restrictions  Restrictions/Precautions  Restrictions/Precautions: Fall Risk, Up as Tolerated, General Precautions  Position Activity Restriction  Spinal Precautions: pt has moderate T8 compression fx with 5mm retropusion per x ray report  Other position/activity restrictions: 2L O2  Subjective   General  Chart Reviewed: Yes  Patient assessed for rehabilitation services?: Yes  Family / Caregiver Present: No  Referring Practitioner: LOBITO Junior MD  Diagnosis: T8 comp fx  Subjective  Subjective: Pt supine, c/o increased spasms  General Comment  Comments: RN clears for bed level activity, reports pt to have MRI  Pain Assessment  Pain Level: 5  Pain Type: Acute pain  Pain Location: Back  Pain Descriptors: Spasm  Vital Signs  Patient Currently in Pain: Yes   Orientation  Orientation  Overall Orientation Status: Within Functional Limits  Objective    ADL  Feeding: Supervision;Setup  Grooming: Supervision;Setup  Toileting: Dependent/Total (pure wick)              Cognition  Overall Cognitive Status: Exceptions  Arousal/Alertness: Delayed responses to stimuli  Following Commands: Follows one step commands with repetition; Follows one step commands with increased time  Attention Span: Attends with cues to redirect  Memory: Decreased short term memory  Safety Judgement: Decreased awareness of need for safety  Problem Solving: Decreased awareness of errors  Insights: Decreased awareness of deficits  Initiation: Requires cues for some  Sequencing: Requires cues for some                    Type of ROM/Therapeutic Exercise  Type of ROM/Therapeutic Exercise: AROM  Exercises  Shoulder Flexion: x10  Elbow Flexion: x10  Elbow Extension: x10  Supination: x10  Pronation: x10  Wrist Flexion: x10  Wrist Extension: x10  Finger Flexion: x10  Finger Extension: x10       Plan   Plan  Times per week: 3-5x/wk    AM-PAC Score        AM-[facility] Inpatient Daily Activity Raw Score: 14 (05/19/21 0944)  AM-PAC Inpatient ADL T-Scale Score : 33.39 (05/19/21 0944)  ADL Inpatient CMS 0-100% Score: 59.67 (05/19/21 0944)  ADL Inpatient CMS G-Code Modifier : CK (05/19/21 5854)    Goals  Short term goals  Time Frame for Short term goals: 1 week by 5/26  Short term goal 1: Pt will complete LB dressing with min A or less-ongoing  Short term goal 2: Pt will complete toileting with CGA-ongoing  Short term goal 3: Pt will tolerate standing at sink for 2-3 grooming tasks-ongoing  Short term goal 4: Pt will tolerate B UE ex x 20 reps w/o fatigue-ongoing, x 10 on 5/20  Patient Goals   Patient goals :  \"Go home\"       Therapy Time   Individual Concurrent Group Co-treatment   Time In 9140         Time Out 0920         Minutes 23         Timed Code Treatment Minutes: Roderick 285, OT

## 2021-05-20 NOTE — PROGRESS NOTES
Hospitalist Progress Note      PCP: Ge Estes MD    Date of Admission: 5/16/2021    Chief Complaint: Near syncope     Hospital Course: 80year old female who was admitted to [facility] after having a presyncopal episode on the toilet seat. Subjective:   Pt is on 1 LPM. Afebrile. VSS. +back pain. No dyspnea or chest pain. No N/V/D. Pt's grand-daughter is at bedside. Medications:  Reviewed    Infusion Medications    sodium chloride 75 mL/hr at 05/20/21 0948    sodium chloride       Scheduled Medications    neomycin-polymyxin-hydrocortisone  1 drop Left Ear 4 times per day    gabapentin  100 mg Oral TID    lidocaine  1 patch Transdermal Daily    methocarbamol  500 mg Oral 4x Daily    sodium chloride flush  10 mL Intravenous 2 times per day    [Held by provider] clopidogrel  75 mg Oral Daily    dilTIAZem  300 mg Oral Daily    levothyroxine  112 mcg Oral Daily    metoprolol tartrate  50 mg Oral BID    pravastatin  40 mg Oral Daily    rOPINIRole  0.25 mg Oral Nightly    doxazosin  2 mg Oral Daily    Vitamin D  1,000 Units Oral Daily     PRN Meds: famotidine, HYDROcodone 5 mg - acetaminophen, sodium chloride flush, sodium chloride, potassium chloride, magnesium sulfate, promethazine **OR** ondansetron, acetaminophen **OR** acetaminophen, labetalol      Intake/Output Summary (Last 24 hours) at 5/20/2021 1654  Last data filed at 5/20/2021 1442  Gross per 24 hour   Intake 120 ml   Output 1400 ml   Net -1280 ml       Physical Exam Performed:    BP (!) 108/51   Pulse 63   Temp 97 °F (36.1 °C) (Oral)   Resp 18   Ht 5' 2\" (1.575 m)   Wt 155 lb 6.4 oz (70.5 kg)   SpO2 93%   BMI 28.42 kg/m²     General appearance: No apparent distress, appears stated age and cooperative. HEENT: Pupils equal, round, and reactive to light. Conjunctivae/corneas clear. Neck: Supple, with full range of motion. No jugular venous distention. Trachea midline. Respiratory:  Normal respiratory effort.  Clear to auscultation, bilaterally without Rales/Wheezes/Rhonchi. Cardiovascular: Regular rate and rhythm with normal S1/S2 without murmurs, rubs or gallops. Abdomen: Soft, non-tender, non-distended with normal bowel sounds. Musculoskeletal: No clubbing, cyanosis or edema bilaterally. Full range of motion without deformity. Skin: Skin color, texture, turgor normal.  No rashes or lesions. Neurologic:  Neurovascularly intact without any focal sensory/motor deficits. Cranial nerves: II-XII intact, grossly non-focal.  Psychiatric: Alert and oriented, thought content appropriate, normal insight  Capillary Refill: Brisk,3 seconds, normal   Peripheral Pulses: +2 palpable, equal bilaterally       Labs:   Recent Labs     05/18/21  0610 05/19/21  1314 05/20/21  0815   WBC 3.8* 4.7 4.4   HGB 8.8* 9.1* 8.7*   HCT 26.2* 26.7* 25.5*    144 144     Recent Labs     05/18/21  0610 05/19/21  1314 05/20/21  0816   * 131* 132*   K 4.4 4.8 4.8   CL 99 102 104   CO2 23 24 23   BUN 15 13 11   CREATININE 1.3* 1.0 1.0   CALCIUM 8.1* 8.3 8.1*     Recent Labs     05/18/21  0610   AST 18   ALT 9*   BILITOT 0.3   ALKPHOS 78     No results for input(s): INR in the last 72 hours. No results for input(s): Tania Ends in the last 72 hours. Urinalysis:      Lab Results   Component Value Date    NITRU Negative 05/16/2021    WBCUA 3-5 05/16/2021    BACTERIA 1+ 05/16/2021    RBCUA 0-2 05/16/2021    BLOODU Negative 05/16/2021    SPECGRAV >=1.030 05/16/2021    GLUCOSEU Negative 05/16/2021       Radiology:  MRI THORACIC SPINE WO CONTRAST   Final Result   1. Acute/subacute T8 compression fracture demonstrating 70% loss of height. 2. Mild spinal canal stenosis at T8 secondary to 5 mm of retropulsion of the   posterior margin of the vertebral body into the spinal canal.   3. Interval development of the small bilateral pleural effusions. CT CHEST PULMONARY EMBOLISM W CONTRAST   Final Result   1. No pulmonary embolism.    2. Cardiomegaly with a small right pleural effusion and mild interstitial   edema in the lung bases. 3. Small scattered bilateral pulmonary nodules without significant change   from 03/05/2021 measuring up to approximately 6 mm. 4. Unchanged mediastinal and right hilar lymphadenopathy. Stable to mildly   improved left axillary lymphadenopathy. 5. Moderate T8 vertebral body compression fracture new from 03/05/2021 with 5   mm retropulsion. If clinically indicated further evaluation could be   obtained with MRI. RECOMMENDATIONS:   Fleischner Society guidelines for follow-up and management of incidentally   detected pulmonary nodules:      Multiple Solid Nodules:      Nodule size less than 6 mm   In a high-risk patient, optional CT at 12 months. - Low risk patients include individuals with minimal or absent history of   smoking and other known risk factors. - High risk patients include individuals with a history or smoking or known   risk factors. Radiology 2017 http://pubs. rsna.org/doi/full/10.1148/radiol. 5866298741         CT HEAD WO CONTRAST   Final Result   No acute intracranial abnormality. Mild paranasal and left mastoid air cell disease. XR CHEST PORTABLE   Final Result   No acute process. Improved aeration right lung base. MRI BRAIN W WO CONTRAST    (Results Pending)       Assessment/Plan:    Active Hospital Problems    Diagnosis     Syncope and collapse [R55]     Compression fracture of body of thoracic vertebra (HCC) [S22.000A]     Generalized weakness [R53.1]     HTN (hypertension), benign [I10]     Compression fracture of thoracic spine, non-traumatic, sequela [M48.54XS]        Non-traumatic acute T8 compression fracture   - Spine surgery consulted who recommended bracing v kyphoplasty. Pt wants to proceed with kyphoplasty.  IR consulted who requested MRI of thoracic spine for further evaluation.  - Continue gabapentin, robaxin, lidocaine patch and norco as needed.     Near syncope   - Consistent with vasovagal vs ?seizure. - CT head nonacute. Orthostatic vitals were negative. - Neurology consulted. Recommended MRI which showed no acute infarct, mass or hemorrhage. EEG unlikely to .  - ECHO showed normal LVEF of 55-60%, no RWMAs, LVDFP is elevated, mod mitral stenosis, mild MR and TR, severe aortic stenosis, mod AR, elevated SPAP at 63 mmHG consistent with severe PHTN. - Pt follows with Cardiology as an oupt, only wants medical treatment for her known AS as noted in her EMR.    - Continue to monitor on telemetry.      Previously diagnosed bacterial pneumonia  - CT chest does not show pneumonia. - Discontinued antibiotic on 5/18/2021.     Chronic kidney disease stage 3  - Baseline creatinine 1.3-1.6. Cr currently 1.0.  - Monitor closely. - Avoid nephrotoxics as able.      Anemia  - Consistent with chronic disease.  - Hgb stable. No overt signs of bleeding. Pt will need OLIVIA outpatient.     Hyponatremia  - Possibly hypovolemic.  - Improving with IV fluids, continue.      Hypertension  - BP elevated likely due to pain. - Continue her home medications. - Monitor closely.      Stage III left breast cancer, recurrent  - On palbociclib 75 mg and monthly fulvestrant and trastuzumab    - Follows with oncologist at [facility].     Pulmonary nodules and mediastinal lymphadenopathy  - Pt to follow up with Oncology. Informed family. Chronic cognitive impairment and underlying dementia  - Continue supportive care.       DVT Prophylaxis: SCDs  Diet: DIET GENERAL;  Code Status: DNR-CC    PT/OT Eval Status: will need after kyphoplasty     Dispo - ~2 days    JENI Gomez - CNP

## 2021-05-20 NOTE — CARE COORDINATION
Pt in need of MRI and kypho. Pt will need new PT/OT eval post procedure. Once done, CM will assist w/home w/HHC or SNF, pending recs.   Ophelia Wheeler RN

## 2021-05-20 NOTE — PROGRESS NOTES
Per Dr Charmayne Lamer, pt will need MRI prior to consult for kyphoplasty, primary RN made aware and she will update medical team.

## 2021-05-20 NOTE — PROGRESS NOTES
Physical Therapy  Pt off floor for MRI. Will continue to follow for POC.      Amara Stephen, PT, DPT

## 2021-05-21 ENCOUNTER — APPOINTMENT (OUTPATIENT)
Dept: INTERVENTIONAL RADIOLOGY/VASCULAR | Age: 86
DRG: 515 | End: 2021-05-21
Payer: MEDICARE

## 2021-05-21 ENCOUNTER — APPOINTMENT (OUTPATIENT)
Dept: GENERAL RADIOLOGY | Age: 86
DRG: 515 | End: 2021-05-21
Payer: MEDICARE

## 2021-05-21 LAB
ANION GAP SERPL CALCULATED.3IONS-SCNC: 4 MMOL/L (ref 3–16)
BUN BLDV-MCNC: 10 MG/DL (ref 7–20)
CALCIUM SERPL-MCNC: 8.3 MG/DL (ref 8.3–10.6)
CHLORIDE BLD-SCNC: 103 MMOL/L (ref 99–110)
CO2: 24 MMOL/L (ref 21–32)
CREAT SERPL-MCNC: 0.9 MG/DL (ref 0.6–1.2)
GFR AFRICAN AMERICAN: >60
GFR NON-AFRICAN AMERICAN: 58
GLUCOSE BLD-MCNC: 101 MG/DL (ref 70–99)
HCT VFR BLD CALC: 25.8 % (ref 36–48)
HEMOGLOBIN: 8.6 G/DL (ref 12–16)
MCH RBC QN AUTO: 31.9 PG (ref 26–34)
MCHC RBC AUTO-ENTMCNC: 33.5 G/DL (ref 31–36)
MCV RBC AUTO: 95.3 FL (ref 80–100)
PDW BLD-RTO: 20 % (ref 12.4–15.4)
PLATELET # BLD: 162 K/UL (ref 135–450)
PMV BLD AUTO: 7.4 FL (ref 5–10.5)
POTASSIUM REFLEX MAGNESIUM: 5 MMOL/L (ref 3.5–5.1)
PRO-BNP: 2362 PG/ML (ref 0–449)
PROCALCITONIN: 0.19 NG/ML (ref 0–0.15)
RBC # BLD: 2.7 M/UL (ref 4–5.2)
SODIUM BLD-SCNC: 131 MMOL/L (ref 136–145)
WBC # BLD: 5.2 K/UL (ref 4–11)

## 2021-05-21 PROCEDURE — 6370000000 HC RX 637 (ALT 250 FOR IP): Performed by: INTERNAL MEDICINE

## 2021-05-21 PROCEDURE — 83880 ASSAY OF NATRIURETIC PEPTIDE: CPT

## 2021-05-21 PROCEDURE — 94761 N-INVAS EAR/PLS OXIMETRY MLT: CPT

## 2021-05-21 PROCEDURE — 85027 COMPLETE CBC AUTOMATED: CPT

## 2021-05-21 PROCEDURE — 97116 GAIT TRAINING THERAPY: CPT

## 2021-05-21 PROCEDURE — 2700000000 HC OXYGEN THERAPY PER DAY

## 2021-05-21 PROCEDURE — 80048 BASIC METABOLIC PNL TOTAL CA: CPT

## 2021-05-21 PROCEDURE — 71045 X-RAY EXAM CHEST 1 VIEW: CPT

## 2021-05-21 PROCEDURE — 6360000002 HC RX W HCPCS: Performed by: REGISTERED NURSE

## 2021-05-21 PROCEDURE — 84145 PROCALCITONIN (PCT): CPT

## 2021-05-21 PROCEDURE — 2580000003 HC RX 258: Performed by: INTERNAL MEDICINE

## 2021-05-21 PROCEDURE — 36415 COLL VENOUS BLD VENIPUNCTURE: CPT

## 2021-05-21 PROCEDURE — 97110 THERAPEUTIC EXERCISES: CPT

## 2021-05-21 PROCEDURE — 97530 THERAPEUTIC ACTIVITIES: CPT

## 2021-05-21 PROCEDURE — 1200000000 HC SEMI PRIVATE

## 2021-05-21 RX ORDER — FUROSEMIDE 10 MG/ML
20 INJECTION INTRAMUSCULAR; INTRAVENOUS ONCE
Status: COMPLETED | OUTPATIENT
Start: 2021-05-21 | End: 2021-05-21

## 2021-05-21 RX ADMIN — NEOMYCIN SULFATE, POLYMYXIN B SULFATE, HYDROCORTISONE 1 DROP: 3.5; 10000; 1 SOLUTION/ DROPS AURICULAR (OTIC) at 07:31

## 2021-05-21 RX ADMIN — HYDROCODONE BITARTRATE AND ACETAMINOPHEN 1 TABLET: 5; 325 TABLET ORAL at 16:34

## 2021-05-21 RX ADMIN — Medication 1000 UNITS: at 09:22

## 2021-05-21 RX ADMIN — METOPROLOL TARTRATE 50 MG: 50 TABLET, FILM COATED ORAL at 21:29

## 2021-05-21 RX ADMIN — NEOMYCIN SULFATE, POLYMYXIN B SULFATE, HYDROCORTISONE 1 DROP: 3.5; 10000; 1 SOLUTION/ DROPS AURICULAR (OTIC) at 13:17

## 2021-05-21 RX ADMIN — GABAPENTIN 100 MG: 100 CAPSULE ORAL at 21:29

## 2021-05-21 RX ADMIN — NEOMYCIN SULFATE, POLYMYXIN B SULFATE, HYDROCORTISONE 1 DROP: 3.5; 10000; 1 SOLUTION/ DROPS AURICULAR (OTIC) at 18:38

## 2021-05-21 RX ADMIN — METHOCARBAMOL TABLETS 500 MG: 500 TABLET, COATED ORAL at 13:17

## 2021-05-21 RX ADMIN — METHOCARBAMOL TABLETS 500 MG: 500 TABLET, COATED ORAL at 16:34

## 2021-05-21 RX ADMIN — METOPROLOL TARTRATE 50 MG: 50 TABLET, FILM COATED ORAL at 09:22

## 2021-05-21 RX ADMIN — FUROSEMIDE 20 MG: 10 INJECTION, SOLUTION INTRAMUSCULAR; INTRAVENOUS at 18:38

## 2021-05-21 RX ADMIN — LEVOTHYROXINE SODIUM 112 MCG: 0.11 TABLET ORAL at 07:31

## 2021-05-21 RX ADMIN — PRAVASTATIN SODIUM 40 MG: 40 TABLET ORAL at 09:23

## 2021-05-21 RX ADMIN — Medication 10 ML: at 21:29

## 2021-05-21 RX ADMIN — HYDROCODONE BITARTRATE AND ACETAMINOPHEN 1 TABLET: 5; 325 TABLET ORAL at 09:23

## 2021-05-21 RX ADMIN — DOXAZOSIN 2 MG: 2 TABLET ORAL at 09:23

## 2021-05-21 RX ADMIN — ROPINIROLE HYDROCHLORIDE 0.25 MG: 0.5 TABLET, FILM COATED ORAL at 21:29

## 2021-05-21 RX ADMIN — METHOCARBAMOL TABLETS 500 MG: 500 TABLET, COATED ORAL at 09:23

## 2021-05-21 RX ADMIN — GABAPENTIN 100 MG: 100 CAPSULE ORAL at 09:23

## 2021-05-21 RX ADMIN — METHOCARBAMOL TABLETS 500 MG: 500 TABLET, COATED ORAL at 21:29

## 2021-05-21 RX ADMIN — GABAPENTIN 100 MG: 100 CAPSULE ORAL at 13:17

## 2021-05-21 RX ADMIN — DILTIAZEM HYDROCHLORIDE 300 MG: 300 CAPSULE, EXTENDED RELEASE ORAL at 09:22

## 2021-05-21 ASSESSMENT — ENCOUNTER SYMPTOMS
FACIAL SWELLING: 0
CHEST TIGHTNESS: 0
ABDOMINAL PAIN: 0
SHORTNESS OF BREATH: 0
COUGH: 0
NAUSEA: 0
BACK PAIN: 1
EYE REDNESS: 0
DIARRHEA: 0
CONSTIPATION: 0
SORE THROAT: 0
RHINORRHEA: 0
EYE PAIN: 0

## 2021-05-21 ASSESSMENT — PAIN DESCRIPTION - LOCATION: LOCATION: ABDOMEN

## 2021-05-21 ASSESSMENT — PAIN SCALES - GENERAL
PAINLEVEL_OUTOF10: 5
PAINLEVEL_OUTOF10: 8

## 2021-05-21 NOTE — PROGRESS NOTES
Hospitalist Progress Note      PCP: Deandre Jimenez MD    Date of Admission: 5/16/2021    Chief Complaint: Near syncope     Hospital Course: 80year old female who was admitted to [facility] after having a presyncopal episode on the toilet seat. Subjective:   Pt is on 1 LPM. Afebrile. VSS. +back pain. Mild dyspnea. No chest pain. No N/V/D. Pt's grand-daughter is at bedside. Medications:  Reviewed    Infusion Medications    sodium chloride       Scheduled Medications    furosemide  20 mg Intravenous Once    neomycin-polymyxin-hydrocortisone  1 drop Left Ear 4 times per day    gabapentin  100 mg Oral TID    lidocaine  1 patch Transdermal Daily    methocarbamol  500 mg Oral 4x Daily    sodium chloride flush  10 mL Intravenous 2 times per day    [Held by provider] clopidogrel  75 mg Oral Daily    dilTIAZem  300 mg Oral Daily    levothyroxine  112 mcg Oral Daily    metoprolol tartrate  50 mg Oral BID    pravastatin  40 mg Oral Daily    rOPINIRole  0.25 mg Oral Nightly    doxazosin  2 mg Oral Daily    Vitamin D  1,000 Units Oral Daily     PRN Meds: famotidine, HYDROcodone 5 mg - acetaminophen, sodium chloride flush, sodium chloride, promethazine **OR** ondansetron, acetaminophen **OR** acetaminophen, labetalol      Intake/Output Summary (Last 24 hours) at 5/21/2021 1645  Last data filed at 5/21/2021 1101  Gross per 24 hour   Intake 100 ml   Output 600 ml   Net -500 ml       Physical Exam Performed:    /61   Pulse 56   Temp 97.8 °F (36.6 °C) (Oral)   Resp 16   Ht 5' 2\" (1.575 m)   Wt 158 lb 14.4 oz (72.1 kg)   SpO2 96%   BMI 29.06 kg/m²     General appearance: No apparent distress, appears stated age and cooperative. HEENT: Pupils equal, round, and reactive to light. Conjunctivae/corneas clear. Neck: Supple, with full range of motion. No jugular venous distention. Trachea midline. Respiratory:  Normal respiratory effort.  Clear to auscultation, bilaterally without cranial mass hemorrhage or hematoma. No abnormal enhancement on postcontrast imaging. MRI THORACIC SPINE WO CONTRAST   Final Result   1. Acute/subacute T8 compression fracture demonstrating 70% loss of height. 2. Mild spinal canal stenosis at T8 secondary to 5 mm of retropulsion of the   posterior margin of the vertebral body into the spinal canal.   3. Interval development of the small bilateral pleural effusions. CT CHEST PULMONARY EMBOLISM W CONTRAST   Final Result   1. No pulmonary embolism. 2. Cardiomegaly with a small right pleural effusion and mild interstitial   edema in the lung bases. 3. Small scattered bilateral pulmonary nodules without significant change   from 03/05/2021 measuring up to approximately 6 mm. 4. Unchanged mediastinal and right hilar lymphadenopathy. Stable to mildly   improved left axillary lymphadenopathy. 5. Moderate T8 vertebral body compression fracture new from 03/05/2021 with 5   mm retropulsion. If clinically indicated further evaluation could be   obtained with MRI. RECOMMENDATIONS:   Fleischner Society guidelines for follow-up and management of incidentally   detected pulmonary nodules:      Multiple Solid Nodules:      Nodule size less than 6 mm   In a high-risk patient, optional CT at 12 months. - Low risk patients include individuals with minimal or absent history of   smoking and other known risk factors. - High risk patients include individuals with a history or smoking or known   risk factors. Radiology 2017 http://pubs. rsna.org/doi/full/10.1148/radiol. 2111409862         CT HEAD WO CONTRAST   Final Result   No acute intracranial abnormality. Mild paranasal and left mastoid air cell disease. XR CHEST PORTABLE   Final Result   No acute process. Improved aeration right lung base.              Assessment/Plan:    Active Hospital Problems    Diagnosis     Syncope and collapse [R55]     Compression fracture of body of thoracic vertebra (HCC) [S22.000A]     Generalized weakness [R53.1]     HTN (hypertension), benign [I10]     Compression fracture of thoracic spine, non-traumatic, sequela [M48.54XS]        Non-traumatic acute T8 compression fracture   - Spine surgery consulted who recommended bracing vs kyphoplasty. Pt wants to proceed with kyphoplasty. IR consulted. Potential plan for kypho on 5/24.  - Continue gabapentin, robaxin, lidocaine patch and norco as needed.     Near syncope   - Consistent with vasovagal vs ?seizure. - CT head nonacute. Orthostatic vitals were negative. - Neurology consulted. Recommended MRI which showed no acute infarct, mass or hemorrhage. EEG unlikely to .  - ECHO showed normal LVEF of 55-60%, no RWMAs, LVDFP is elevated, mod mitral stenosis, mild MR and TR, severe aortic stenosis, mod AR, elevated SPAP at 63 mmHG consistent with severe PHTN. - Pt follows with Cardiology as an oupt, only wants medical treatment for her known AS as noted in her EMR.    - Continue to monitor on telemetry. Mild hypoxemia   - Likely multifactorial due to atelectasis and pulmonary edema/pleural effusions from IV fluids.   - Fluids dc'd. x1 dose of 20 mg lasix has been ordered. - Encourage pulmonary toilet with IS, cough, deep breathing, OOB/ambulation.   - Wean supplemental O2 as tolerated. She is on .5 LPM.      Previously diagnosed bacterial pneumonia  - CT chest did not show pneumonia. - Discontinued antibiotic on 5/18/2021.  - Pt remains afebrile with normal WBC.      Chronic kidney disease stage 3  - Baseline creatinine 1.3-1.6. Cr currently 1.0.  - Monitor closely. - Avoid nephrotoxics as able.      Anemia  - Consistent with chronic disease.  - No overt signs of bleeding.   - Hgb stable. Monitor closely.      Hyponatremia (improved)  - Possibly hypovolemic.  - S/p with IV fluids.      Hypertension  - Controlled. - Continue her home medications. - Monitor closely.    Stage III left breast cancer, recurrent  - On palbociclib 75 mg and monthly fulvestrant and trastuzumab    - Follows with oncologist at [facility].     Pulmonary nodules and mediastinal lymphadenopathy  - Pt to follow up with Oncology. Informed family. Chronic cognitive impairment and underlying dementia  - Continue supportive care. DVT Prophylaxis: SCDs  Diet: DIET GENERAL;  Diet NPO Time Specified Exceptions are: Ice Chips, Sips with Meds, Popsicles  Code Status: DNR-CC    PT/OT Eval Status: will need after kyphoplasty     Dispo - Potential plan for kyphoplasty on 5/24.     [facility], APRN - CNP

## 2021-05-21 NOTE — PROGRESS NOTES
Called to verify any update on Kyphoplasty scheduling, no update at this time, will call back at a later time

## 2021-05-21 NOTE — CARE COORDINATION
Pt followed by IM and Neuro. Per RN, pt will have Kypho on Monday at 2pm w/Dr Khadijah Miranda 855. Pt will need new PT/OT recs pending sx. If SNF rec'd, family would like pt to d/c to [facility]. CM will make referral if needed. Otherwise, pt will d/c home w/[facility].   Ara Donohue RN

## 2021-05-21 NOTE — PROGRESS NOTES
Well we tentatively plan to do pt kyphoplasty on Monday 5/24/21 at 1430. Pt will need to be NPO at midnight on Sunday and blood thinners held as appropriate.  Primary RN updated and she will update family and medical team.

## 2021-05-21 NOTE — PROGRESS NOTES
Physical Therapy  Facility/Department: [facility] A2 CARD TELEMETRY  Daily Treatment Note  NAME: Jenifer Sosa  : 1926  MRN: [id number]    Date of Service: 2021    Discharge Recommendations:  24 hour supervision or assist, Home with Home health PT        Assessment   Body structures, Functions, Activity limitations: Decreased functional mobility ; Increased pain;Decreased strength;Decreased posture  Assessment: Pt agreeable to PT session this afternoon, requesting to go to the bathroom. Upon entry, pt was mildly SOB with some wheezing while semireclined in bed. Pt was able to ambulate to and from bathroom with RW and 1L of O2 with CGA today despite obviously poor endurance. Pt was on .% L O2 upon entry, increased to 1L for mobility with mild decrease in SpO2 as indicated below. Cont per POC to address deficits. Treatment Diagnosis: Back pain, decreased mobility  Prognosis: Good  Decision Making: Medium Complexity  PT Education: Goals;Transfer Training;Disease Specific Education;PT Role;Plan of Care;General Safety;Home Exercise Program;Gait Training;Precautions  Patient Education: Pt and educated in spinal precautions and basic body mechanics for bed mob/transfers/ADLs given T8 fx. Voices understanding, performed log roll for bed mobility without cues  Barriers to Learning: none  REQUIRES PT FOLLOW UP: Yes  Activity Tolerance  Activity Tolerance: Patient limited by endurance; Patient limited by fatigue  Activity Tolerance: See ambulation portion for specifics on SpO2 with mobility; /60; HR 60; SpO2 89% on entry, improved to 91% with PLB while semireclined prior to mobility on .5 L     Patient Diagnosis(es): The primary encounter diagnosis was Compression fracture of body of thoracic vertebra ([address] Diagnoses of Syncope and collapse and Generalized weakness were also pertinent to this visit. has a past medical history of Cancer ([address] Cataract, High blood pressure, and Thyroid disorder.    has a past surgical history that includes Hysterectomy; Cholecystectomy; Cataract removal; bladder suspension (2000); and Breast lumpectomy (Left, 08/21/15). Restrictions  Restrictions/Precautions  Restrictions/Precautions: Fall Risk, Up as Tolerated, General Precautions  Position Activity Restriction  Spinal Precautions: pt has moderate T8 compression fx with 5mm retropusion per x ray report  Other position/activity restrictions: .5 L O2  Subjective   General  Chart Reviewed: Yes  Response To Previous Treatment: Patient with no complaints from previous session. Family / Caregiver Present: Yes (son)  Referring Practitioner: Lesli Richrads  Subjective  Subjective: Pt resting in bed upon entry, agreeable to PT  General Comment  Comments: RN cleared pt for therapy  Pain Screening  Patient Currently in Pain: Yes  Pain Assessment  Pain Assessment: 0-10  Pain Level: 8  Pain Type: Acute pain  Pain Location: Abdomen  Pain Orientation: Left  Non-Pharmaceutical Pain Intervention(s): Ambulation/Increased Activity;Repositioned; Therapeutic presence  Vital Signs  Patient Currently in Pain: Yes       Orientation  Orientation  Overall Orientation Status: Within Functional Limits        Objective   Bed mobility  Rolling to Right: Stand by assistance  Supine to Sit: Stand by assistance  Sit to Supine: Stand by assistance  Scooting: Stand by assistance (to EOB and to scoot up in bed)  Transfers  Sit to Stand: Contact guard assistance  Stand to sit: Contact guard assistance  Bed to Chair: Contact guard assistance  Ambulation  Ambulation?: Yes  Ambulation 1  Surface: level tile  Device: Rolling Walker  Other Apparatus: O2 (increased O2 from . 5 to 1L for ambulation as O2 sats at rest on .5 read 89%)  Assistance: Contact guard assistance  Quality of Gait: slow pace, recipricol pattern, shortened stride, mildy forward flexed at trunk  Distance: 8 ft x 2  Comments: Pt with significant SOB and wheezing during and after mobility on 1L O2, SpO2 after ambulating to and from bathroom at 88% on 1L, with PLB and 30 seconds, improved to 92%     Balance  Posture: Fair  Sitting - Static: Good  Sitting - Dynamic: Good  Standing - Static: Fair  Standing - Dynamic: Fair  Exercises  Quad Sets: 10 x B  Heelslides: x 10 B  Gluteal Sets: 10 x B  Knee Short Arc Quad: x 10 B  Ankle Pumps: 15 x B                       AM-PAC Score  AM-PAC Inpatient Mobility Raw Score : 18 (05/21/21 1622)  AM-PAC Inpatient T-Scale Score : 43.63 (05/21/21 1622)  Mobility Inpatient CMS 0-100% Score: 46.58 (05/21/21 1622)  Mobility Inpatient CMS G-Code Modifier : CK (05/21/21 1622)          Goals  Short term goals  Time Frame for Short term goals: 1 week (5/25) unless otherwise specified  Short term goal 1: pt to perform bed mob modified indep; 5/21 SBA  Short term goal 2: pt to perform trasnfers modified indep; 5/21 CGA  Short term goal 3: pt to amb 120 ft with SBA using RW; 5/12 8 ft x 2 with RW and CGA  Short term goal 4: pt to participate in 12-15 reps LE Ex by 5/21; progressing with 10 reps  Patient Goals   Patient goals : \"to know how to move so I protect my back and have less pain\"- MET    Plan    Plan  Times per week: 3-5 x week  Times per day: Daily  Current Treatment Recommendations: Strengthening, Transfer Training, Endurance Training, Gait Training, Functional Mobility Training, Safety Education & Training, Home Exercise Program  Safety Devices  Type of devices: All fall risk precautions in place, Gait belt, Call light within reach, Left in bed, Patient at risk for falls, Bed alarm in place, Nurse notified     Therapy Time   Individual Concurrent Group Co-treatment   Time In 1344         Time Out 1423         Minutes 39         Timed Code Treatment Minutes: 44 Minutes    If pt is discharged prior to next therapy session, this note will serve as discharge summary.     Reyes Fontana, PT

## 2021-05-21 NOTE — CONSULTS
[facility]  INTERVENTIONAL-RADIOLOGY    INPATIENT CONSULT       Reason for consult: Compression Fracture of T8 Thoracic Vertebrae  Consulting provider: Angel Elliott MD    Name: Ashley Ricks  :  1926  MRN: [id number]  Date: 21      CC:   Chief Complaint   Patient presents with    Seizures     witnessed Shaking episode \"possible seizure\" per granddaughter. Pt states she never lost consciousness Recent admission here for pneumonia    Nausea       HPI: Ashley Ricks is an 80 y.o. female with history of Osteoporosis and Breast CA. She was admitted on 2021 after an apparent syncopal episode which resulted in a fall. She was found to have a T8 compression fracture on CT. This was also confirmed with MRI yesterday. We have been asked to evaluate for possible Kyphoplasty of T8 Thoracic Vertebrae. She reports that she is unsure how she initially fell. States she was in the shower and the the next thing she knew, she had fallen. She reports pain in the middle of her back which does wrap around under her left ribs. She states this is new since the fall. She describes the pain as a squeezing sensation internally. It is made worse with movement and certain positions are better than others in regard to pain. She denies any pain radiating down her legs. She denies fevers or chills. She denies central CP or SOB. She denies cough/congestion. She denies fevers/chills. She did complete a round of Cipro for a UTI just prior to being admitted to the hospital. She denies any residual symptoms of UTI since she completed the antibiotics.          ASSESSMENT/PLAN  Active Problems:  Compression fracture of body of thoracic vertebra (HCC)  -Appreciate consult  -CT and MRI reviewed  -Around 50% of lost height at T8  -Minimal retropulsion noted  -Good candidate for Kyphoplasty  -I reviewed procedure and risks/benefits with Geovanna Badillo  -She wants to proceed with Kyphoplasty  -Likely will be next week  -Will repeat UA prior to procedure  -Can proceed as outpatient if necessary  -Will proceed with scheduling Kyphoplasty in IR with moderate sedation    Generalized weakness  -Working with PT  -Was fully functional prior to admission    Syncope and collapse  -Reason for admission   -?Causative of compression fx  -No dizziness currently    HTN  -Per primary team      >45 minutes was spent including chart review, imaging review, face to face evaluation and explanation with patient and family, physical exam, and documentation            ADVANCE CARE PLANNING    The patient has appointed the following active healthcare agents:    Primary Decision MakerMarylily Jonah Child - [phone]    Secondary Decision Maker: Darshan Michel - Child - [phone]    The Patient has the following current code status:    Code Status: DNR-CC    Visit Documentation:  I discussed Advance Care Planning with Zenon Bowman today which included the patient's choices for care and treatment in the case of a health event that adversely affects decision-making abilities. She stated the Advance Care Directives on file are current. We discussed her current values, goals and care preferences at the end of life. Zenon Bowman has no questions at this time and has agreed to keep me up-to-date should anything change. Reza Herman PA-C  5/21/2021           Past Medical History:   Diagnosis Date    Cancer [facility])     breast    Cataract     High blood pressure     Thyroid disorder       Past Surgical History:   Procedure Laterality Date    BLADDER SUSPENSION  2000    BREAST LUMPECTOMY Left 08/21/15    CATARACT REMOVAL      CHOLECYSTECTOMY      HYSTERECTOMY       Social History     Socioeconomic History    Marital status:       Spouse name: None    Number of children: None    Years of education: None    Highest education level: None   Occupational History    None   Tobacco Use    Smoking status: Former Smoker     Quit date: 1/1/1989     Years since quittin.4    Smokeless tobacco: Never Used   Substance and Sexual Activity    Alcohol use: No     Alcohol/week: 0.0 standard drinks     Comment: 1 beer every other week    Drug use: No    Sexual activity: None   Other Topics Concern    None   Social History Narrative    None     Social Determinants of Health     Financial Resource Strain:     Difficulty of Paying Living Expenses:    Food Insecurity:     Worried About Running Out of Food in the Last Year:     Ran Out of Food in the Last Year:    Transportation Needs:     Lack of Transportation (Medical):     Lack of Transportation (Non-Medical):    Physical Activity:     Days of Exercise per Week:     Minutes of Exercise per Session:    Stress:     Feeling of Stress :    Social Connections:     Frequency of Communication with Friends and Family:     Frequency of Social Gatherings with Friends and Family:     Attends [employer] Services: Active Member of Clubs or Organizations:     Attends Club or Organization Meetings:     Marital Status:    Intimate Partner Violence:     Fear of Current or Ex-Partner:     Emotionally Abused:     Physically Abused:     Sexually Abused:           Allergies   Allergen Reactions    Morphine And Related Shortness Of Breath     Other reaction(s): Hallucinations  palpitations & diaphoresis      Nitrofurantoin Shortness Of Breath    Ciprofloxacin Diarrhea    Codeine Other (See Comments)     HALLUCINATIONS    Sympathomimetics Other (See Comments)     sudafed = 'jumpy'      Current Facility-Administered Medications: famotidine (PEPCID) tablet 10 mg, 10 mg, Oral, Daily PRN  neomycin-polymyxin-hydrocortisone otic solution 1 drop, 1 drop, Left Ear, 4 times per day  0.9 % sodium chloride infusion, , Intravenous, Continuous  gabapentin (NEURONTIN) capsule 100 mg, 100 mg, Oral, TID  lidocaine 4 % external patch 1 patch, 1 patch, Transdermal, Daily  HYDROcodone-acetaminophen (NORCO) 5-325 MG per tablet 1 tablet, 1 tablet, Oral, Q6H PRN  methocarbamol (ROBAXIN) tablet 500 mg, 500 mg, Oral, 4x Daily  sodium chloride flush 0.9 % injection 10 mL, 10 mL, Intravenous, 2 times per day  sodium chloride flush 0.9 % injection 10 mL, 10 mL, Intravenous, PRN  0.9 % sodium chloride infusion, 25 mL, Intravenous, PRN  promethazine (PHENERGAN) tablet 12.5 mg, 12.5 mg, Oral, Q6H PRN **OR** ondansetron (ZOFRAN) injection 4 mg, 4 mg, Intravenous, Q6H PRN  acetaminophen (TYLENOL) tablet 650 mg, 650 mg, Oral, Q6H PRN **OR** acetaminophen (TYLENOL) suppository 650 mg, 650 mg, Rectal, Q6H PRN  [Held by provider] clopidogrel (PLAVIX) tablet 75 mg, 75 mg, Oral, Daily  dilTIAZem (CARDIZEM CD) extended release capsule 300 mg, 300 mg, Oral, Daily  levothyroxine (SYNTHROID) tablet 112 mcg, 112 mcg, Oral, Daily  metoprolol tartrate (LOPRESSOR) tablet 50 mg, 50 mg, Oral, BID  pravastatin (PRAVACHOL) tablet 40 mg, 40 mg, Oral, Daily  rOPINIRole (REQUIP) tablet 0.25 mg, 0.25 mg, Oral, Nightly  doxazosin (CARDURA) tablet 2 mg, 2 mg, Oral, Daily  Vitamin D (CHOLECALCIFEROL) tablet 1,000 Units, 1,000 Units, Oral, Daily  labetalol (NORMODYNE;TRANDATE) injection 10 mg, 10 mg, Intravenous, Q4H PRN     Review of Systems   Constitutional: Negative for diaphoresis, fatigue and fever. HENT: Negative for ear pain, facial swelling, postnasal drip, rhinorrhea and sore throat. Eyes: Negative for pain, redness and visual disturbance. Respiratory: Negative for cough, chest tightness and shortness of breath. Cardiovascular: Negative for chest pain, palpitations and leg swelling. Gastrointestinal: Negative for abdominal pain, constipation, diarrhea and nausea. Genitourinary: Negative for difficulty urinating, dysuria, flank pain and frequency. Musculoskeletal: Positive for back pain. Negative for arthralgias and myalgias. Skin: Negative for pallor and rash. Neurological: Positive for weakness. Negative for dizziness, numbness and headaches.    Psychiatric/Behavioral: Negative for agitation, behavioral problems and confusion. BP (!) 129/59   Pulse 56   Temp 97.7 °F (36.5 °C) (Oral)   Resp 18   Ht 5' 2\" (1.575 m)   Wt 158 lb 14.4 oz (72.1 kg)   SpO2 94%   BMI 29.06 kg/m²     Physical Exam  Vitals and nursing note reviewed. Constitutional:       Appearance: She is well-developed. She is not diaphoretic. HENT:      Head: Normocephalic and atraumatic. Right Ear: External ear normal.      Left Ear: External ear normal.      Nose: Nose normal.   Eyes:      General:         Right eye: No discharge. Left eye: No discharge. Extraocular Movements: Extraocular movements intact. Cardiovascular:      Rate and Rhythm: Normal rate and regular rhythm. Heart sounds: Normal heart sounds. No murmur heard. No friction rub. No gallop. Pulmonary:      Effort: Pulmonary effort is normal. No respiratory distress. Breath sounds: Normal breath sounds. No wheezing or rales. Abdominal:      General: Abdomen is flat. Palpations: Abdomen is soft. Tenderness: There is no abdominal tenderness. Musculoskeletal:         General: Normal range of motion. Cervical back: Normal range of motion and neck supple. Thoracic back: Bony tenderness (T8) present. Right lower leg: No edema. Left lower leg: No edema. Skin:     General: Skin is warm and dry. Neurological:      General: No focal deficit present. Mental Status: She is alert and oriented to person, place, and time. Mental status is at baseline. Cranial Nerves: No cranial nerve deficit. Psychiatric:         Mood and Affect: Mood normal.         Behavior: Behavior normal.         Thought Content: Thought content normal.           RECENT IMAGING    MRI BRAIN W WO CONTRAST   Final Result   No evidence for restricted diffusion to suggest acute infarct.       Scattered white matter changes in the periventricular white matter and   subcortical white matter as well as in the tierra. No evidence for blood products on gradient imaging. No intra cranial mass hemorrhage or hematoma. No abnormal enhancement on postcontrast imaging. MRI THORACIC SPINE WO CONTRAST   Final Result   1. Acute/subacute T8 compression fracture demonstrating 70% loss of height. 2. Mild spinal canal stenosis at T8 secondary to 5 mm of retropulsion of the   posterior margin of the vertebral body into the spinal canal.   3. Interval development of the small bilateral pleural effusions. CT CHEST PULMONARY EMBOLISM W CONTRAST   Final Result   1. No pulmonary embolism. 2. Cardiomegaly with a small right pleural effusion and mild interstitial   edema in the lung bases. 3. Small scattered bilateral pulmonary nodules without significant change   from 03/05/2021 measuring up to approximately 6 mm. 4. Unchanged mediastinal and right hilar lymphadenopathy. Stable to mildly   improved left axillary lymphadenopathy. 5. Moderate T8 vertebral body compression fracture new from 03/05/2021 with 5   mm retropulsion. If clinically indicated further evaluation could be   obtained with MRI. RECOMMENDATIONS:   Fleischner Society guidelines for follow-up and management of incidentally   detected pulmonary nodules:      Multiple Solid Nodules:      Nodule size less than 6 mm   In a high-risk patient, optional CT at 12 months. - Low risk patients include individuals with minimal or absent history of   smoking and other known risk factors. - High risk patients include individuals with a history or smoking or known   risk factors. Radiology 2017 http://pubs. rsna.org/doi/full/10.1148/radiol. [email or web address]         CT HEAD WO CONTRAST   Final Result   No acute intracranial abnormality. Mild paranasal and left mastoid air cell disease. XR CHEST PORTABLE   Final Result   No acute process. Improved aeration right lung base.                Tomas Pineda PA-C  05/21/21 8:44 AM

## 2021-05-22 LAB
ANION GAP SERPL CALCULATED.3IONS-SCNC: 5 MMOL/L (ref 3–16)
BUN BLDV-MCNC: 11 MG/DL (ref 7–20)
CALCIUM SERPL-MCNC: 8.8 MG/DL (ref 8.3–10.6)
CHLORIDE BLD-SCNC: 100 MMOL/L (ref 99–110)
CO2: 27 MMOL/L (ref 21–32)
CREAT SERPL-MCNC: 1.1 MG/DL (ref 0.6–1.2)
GFR AFRICAN AMERICAN: 56
GFR NON-AFRICAN AMERICAN: 46
GLUCOSE BLD-MCNC: 89 MG/DL (ref 70–99)
HCT VFR BLD CALC: 26.4 % (ref 36–48)
HEMOGLOBIN: 8.9 G/DL (ref 12–16)
MCH RBC QN AUTO: 32.1 PG (ref 26–34)
MCHC RBC AUTO-ENTMCNC: 33.6 G/DL (ref 31–36)
MCV RBC AUTO: 95.5 FL (ref 80–100)
PDW BLD-RTO: 19.7 % (ref 12.4–15.4)
PLATELET # BLD: 196 K/UL (ref 135–450)
PMV BLD AUTO: 7.5 FL (ref 5–10.5)
POTASSIUM REFLEX MAGNESIUM: 5.2 MMOL/L (ref 3.5–5.1)
RBC # BLD: 2.76 M/UL (ref 4–5.2)
SODIUM BLD-SCNC: 132 MMOL/L (ref 136–145)
TROPONIN: <0.01 NG/ML
WBC # BLD: 4.7 K/UL (ref 4–11)

## 2021-05-22 PROCEDURE — 94640 AIRWAY INHALATION TREATMENT: CPT

## 2021-05-22 PROCEDURE — 2580000003 HC RX 258: Performed by: INTERNAL MEDICINE

## 2021-05-22 PROCEDURE — 94761 N-INVAS EAR/PLS OXIMETRY MLT: CPT

## 2021-05-22 PROCEDURE — 6370000000 HC RX 637 (ALT 250 FOR IP): Performed by: INTERNAL MEDICINE

## 2021-05-22 PROCEDURE — 2700000000 HC OXYGEN THERAPY PER DAY

## 2021-05-22 PROCEDURE — 36415 COLL VENOUS BLD VENIPUNCTURE: CPT

## 2021-05-22 PROCEDURE — 85027 COMPLETE CBC AUTOMATED: CPT

## 2021-05-22 PROCEDURE — 84484 ASSAY OF TROPONIN QUANT: CPT

## 2021-05-22 PROCEDURE — 93005 ELECTROCARDIOGRAM TRACING: CPT | Performed by: REGISTERED NURSE

## 2021-05-22 PROCEDURE — 1200000000 HC SEMI PRIVATE

## 2021-05-22 PROCEDURE — 80048 BASIC METABOLIC PNL TOTAL CA: CPT

## 2021-05-22 RX ORDER — IPRATROPIUM BROMIDE AND ALBUTEROL SULFATE 2.5; .5 MG/3ML; MG/3ML
1 SOLUTION RESPIRATORY (INHALATION) EVERY 4 HOURS PRN
Status: DISCONTINUED | OUTPATIENT
Start: 2021-05-22 | End: 2021-05-25 | Stop reason: HOSPADM

## 2021-05-22 RX ADMIN — METHOCARBAMOL TABLETS 500 MG: 500 TABLET, COATED ORAL at 21:58

## 2021-05-22 RX ADMIN — METHOCARBAMOL TABLETS 500 MG: 500 TABLET, COATED ORAL at 13:46

## 2021-05-22 RX ADMIN — ROPINIROLE HYDROCHLORIDE 0.25 MG: 0.5 TABLET, FILM COATED ORAL at 21:58

## 2021-05-22 RX ADMIN — NEOMYCIN SULFATE, POLYMYXIN B SULFATE, HYDROCORTISONE 1 DROP: 3.5; 10000; 1 SOLUTION/ DROPS AURICULAR (OTIC) at 23:41

## 2021-05-22 RX ADMIN — METOPROLOL TARTRATE 50 MG: 50 TABLET, FILM COATED ORAL at 09:50

## 2021-05-22 RX ADMIN — METHOCARBAMOL TABLETS 500 MG: 500 TABLET, COATED ORAL at 09:50

## 2021-05-22 RX ADMIN — NEOMYCIN SULFATE, POLYMYXIN B SULFATE, HYDROCORTISONE 1 DROP: 3.5; 10000; 1 SOLUTION/ DROPS AURICULAR (OTIC) at 09:51

## 2021-05-22 RX ADMIN — GABAPENTIN 100 MG: 100 CAPSULE ORAL at 13:46

## 2021-05-22 RX ADMIN — GABAPENTIN 100 MG: 100 CAPSULE ORAL at 09:50

## 2021-05-22 RX ADMIN — PRAVASTATIN SODIUM 40 MG: 40 TABLET ORAL at 09:50

## 2021-05-22 RX ADMIN — NEOMYCIN SULFATE, POLYMYXIN B SULFATE, HYDROCORTISONE 1 DROP: 3.5; 10000; 1 SOLUTION/ DROPS AURICULAR (OTIC) at 13:46

## 2021-05-22 RX ADMIN — DILTIAZEM HYDROCHLORIDE 300 MG: 300 CAPSULE, EXTENDED RELEASE ORAL at 09:50

## 2021-05-22 RX ADMIN — METHOCARBAMOL TABLETS 500 MG: 500 TABLET, COATED ORAL at 18:23

## 2021-05-22 RX ADMIN — HYDROCODONE BITARTRATE AND ACETAMINOPHEN 1 TABLET: 5; 325 TABLET ORAL at 08:33

## 2021-05-22 RX ADMIN — METOPROLOL TARTRATE 50 MG: 50 TABLET, FILM COATED ORAL at 21:58

## 2021-05-22 RX ADMIN — GABAPENTIN 100 MG: 100 CAPSULE ORAL at 21:58

## 2021-05-22 RX ADMIN — NEOMYCIN SULFATE, POLYMYXIN B SULFATE, HYDROCORTISONE 1 DROP: 3.5; 10000; 1 SOLUTION/ DROPS AURICULAR (OTIC) at 18:24

## 2021-05-22 RX ADMIN — HYDROCODONE BITARTRATE AND ACETAMINOPHEN 1 TABLET: 5; 325 TABLET ORAL at 16:16

## 2021-05-22 RX ADMIN — Medication 10 ML: at 09:55

## 2021-05-22 RX ADMIN — DOXAZOSIN 2 MG: 2 TABLET ORAL at 09:50

## 2021-05-22 RX ADMIN — Medication 1000 UNITS: at 09:50

## 2021-05-22 RX ADMIN — Medication 10 ML: at 21:59

## 2021-05-22 RX ADMIN — LEVOTHYROXINE SODIUM 112 MCG: 0.11 TABLET ORAL at 09:52

## 2021-05-22 ASSESSMENT — PAIN SCALES - GENERAL: PAINLEVEL_OUTOF10: 0

## 2021-05-22 ASSESSMENT — PAIN DESCRIPTION - PAIN TYPE: TYPE: ACUTE PAIN

## 2021-05-22 NOTE — PROGRESS NOTES
per day    gabapentin  100 mg Oral TID    lidocaine  1 patch Transdermal Daily    methocarbamol  500 mg Oral 4x Daily    sodium chloride flush  10 mL Intravenous 2 times per day    [Held by provider] clopidogrel  75 mg Oral Daily    dilTIAZem  300 mg Oral Daily    levothyroxine  112 mcg Oral Daily    metoprolol tartrate  50 mg Oral BID    pravastatin  40 mg Oral Daily    rOPINIRole  0.25 mg Oral Nightly    doxazosin  2 mg Oral Daily    Vitamin D  1,000 Units Oral Daily     PRN Meds: HYDROcodone 5 mg - acetaminophen, sodium chloride flush, sodium chloride, promethazine **OR** ondansetron, acetaminophen **OR** acetaminophen, labetalol      Intake/Output Summary (Last 24 hours) at 5/22/2021 1038  Last data filed at 5/22/2021 0111  Gross per 24 hour   Intake 220 ml   Output 1850 ml   Net -1630 ml       Physical Exam Performed:    BP (!) 157/67   Pulse 72   Temp 97.7 °F (36.5 °C) (Oral)   Resp 18   Ht 5' 2\" (1.575 m)   Wt 157 lb 8 oz (71.4 kg)   SpO2 97%   BMI 28.81 kg/m²     General appearance: No apparent distress, appears stated age and cooperative. HEENT: Pupils equal, round, and reactive to light. Conjunctivae/corneas clear. Neck: Supple, with full range of motion. No jugular venous distention. Trachea midline. Respiratory:  Normal respiratory effort. Clear to auscultation, bilaterally without Rales/Wheezes/Rhonchi. Cardiovascular: Regular rate and rhythm with normal S1/S2 without murmurs, rubs or gallops. Abdomen: Soft, non-tender, non-distended with normal bowel sounds. Musculoskeletal: No clubbing, cyanosis or edema bilaterally. Full range of motion without deformity. Skin: Skin color, texture, turgor normal.  No rashes or lesions. Neurologic:  Neurovascularly intact without any focal sensory/motor deficits.  Cranial nerves: II-XII intact, grossly non-focal.  Psychiatric: Alert and oriented, thought content appropriate, normal insight  Capillary Refill: Brisk,3 seconds, normal Peripheral Pulses: +2 palpable, equal bilaterally       Labs:   Recent Labs     05/20/21  0815 05/21/21  0743 05/22/21  0839   WBC 4.4 5.2 4.7   HGB 8.7* 8.6* 8.9*   HCT 25.5* 25.8* 26.4*    162 196     Recent Labs     05/20/21  0816 05/21/21  0742 05/22/21  0839   * 131* 132*   K 4.8 5.0 5.2*    103 100   CO2 23 24 27   BUN 11 10 11   CREATININE 1.0 0.9 1.1   CALCIUM 8.1* 8.3 8.8     Urinalysis:      Lab Results   Component Value Date    NITRU Negative 05/16/2021    WBCUA 3-5 05/16/2021    BACTERIA 1+ 05/16/2021    RBCUA 0-2 05/16/2021    BLOODU Negative 05/16/2021    SPECGRAV >=1.030 05/16/2021    GLUCOSEU Negative 05/16/2021       Radiology:  XR CHEST PORTABLE   Final Result   Increased interstitial markings reflecting pulmonary vascular   congestion/interstitial edema versus an atypical pneumonia. Small bilateral pleural effusions and/or basilar atelectasis. Right basilar atelectasis versus airspace disease. IR INPATIENT CONSULT 36 MIN (FOR RADIOLOGIST USE ONLY)   Final Result      MRI BRAIN W WO CONTRAST   Final Result   No evidence for restricted diffusion to suggest acute infarct. Scattered white matter changes in the periventricular white matter and   subcortical white matter as well as in the tierra. No evidence for blood products on gradient imaging. No intra cranial mass hemorrhage or hematoma. No abnormal enhancement on postcontrast imaging. MRI THORACIC SPINE WO CONTRAST   Final Result   1. Acute/subacute T8 compression fracture demonstrating 70% loss of height. 2. Mild spinal canal stenosis at T8 secondary to 5 mm of retropulsion of the   posterior margin of the vertebral body into the spinal canal.   3. Interval development of the small bilateral pleural effusions. CT CHEST PULMONARY EMBOLISM W CONTRAST   Final Result   1. No pulmonary embolism.    2. Cardiomegaly with a small right pleural effusion and mild interstitial   edema in the lung bases. 3. Small scattered bilateral pulmonary nodules without significant change   from 03/05/2021 measuring up to approximately 6 mm. 4. Unchanged mediastinal and right hilar lymphadenopathy. Stable to mildly   improved left axillary lymphadenopathy. 5. Moderate T8 vertebral body compression fracture new from 03/05/2021 with 5   mm retropulsion. If clinically indicated further evaluation could be   obtained with MRI. RECOMMENDATIONS:   Fleischner Society guidelines for follow-up and management of incidentally   detected pulmonary nodules:      Multiple Solid Nodules:      Nodule size less than 6 mm   In a high-risk patient, optional CT at 12 months. - Low risk patients include individuals with minimal or absent history of   smoking and other known risk factors. - High risk patients include individuals with a history or smoking or known   risk factors. Radiology 2017 http://pubs. rsna.org/doi/full/10.1148/radiol. 1411468503         CT HEAD WO CONTRAST   Final Result   No acute intracranial abnormality. Mild paranasal and left mastoid air cell disease. XR CHEST PORTABLE   Final Result   No acute process. Improved aeration right lung base. IR KYPHOPLASTY THORACIC 1 VERTEBRAL BODY    (Results Pending)       Assessment/Plan:    Active Hospital Problems    Diagnosis     Syncope and collapse [R55]     Compression fracture of body of thoracic vertebra (HCC) [S22.000A]     Generalized weakness [R53.1]     HTN (hypertension), benign [I10]     Compression fracture of thoracic spine, non-traumatic, sequela [M48.54XS]        Non-traumatic acute T8 compression fracture   - Spine surgery consulted who recommended bracing vs kyphoplasty. Pt wants to proceed with kyphoplasty. IR consulted. Tentative plan for kypho on 5/24.  - Continue gabapentin, robaxin, lidocaine patch and norco as needed.     Near syncope   - Consistent with vasovagal vs ?seizure.   - CT head nonacute. Orthostatic vitals were negative. - Neurology consulted. Recommended MRI which showed no acute infarct, mass or hemorrhage. EEG unlikely to .  - ECHO showed normal LVEF of 55-60%, no RWMAs, LVDFP is elevated, mod mitral stenosis, mild MR and TR, severe aortic stenosis, mod AR, elevated SPAP at 63 mmHG consistent with severe PHTN. - Pt follows with Cardiology as an oupt, only wants medical treatment for her known AS as noted in her EMR.    - Continue to monitor on telemetry. Mild hypoxemia   - Likely multifactorial due to atelectasis and pulmonary edema/pleural effusions from IV fluids.   - Fluids dc'd. S/p x1 dose of 20 mg lasix on 5/21.   - Encourage pulmonary toilet with IS, cough, deep breathing, OOB/ambulation.   - Wean supplemental O2 as tolerated. She is on .5 LPM.      Previously diagnosed bacterial pneumonia  - CT chest did not show pneumonia. - Discontinued antibiotic on 5/18/21.  - Pt remains afebrile with normal WBC.      Chronic kidney disease stage 3  - Stable. Baseline creatinine 1.3-1.6.   - Monitor closely. - Avoid nephrotoxics as able.      Anemia  - Consistent with chronic disease.  - No overt signs of bleeding.   - Hgb stable. Monitor closely.      Hyponatremia (improved)  - Possibly hypovolemic.  - S/p IV fluids.      Hypertension  - Variable control.   - Continue her home medications. - Monitor closely.      Stage III left breast cancer, recurrent  - On palbociclib 75 mg and monthly fulvestrant and trastuzumab    - Follows with oncologist at [facility].     Pulmonary nodules and mediastinal lymphadenopathy  - Pt to follow up with Oncology. Informed family. Chronic cognitive impairment and underlying dementia  - Continue supportive care. DVT Prophylaxis: SCDs  Diet: DIET GENERAL;  Diet NPO, After Midnight  Code Status: DNR-CC    PT/OT Eval Status: recs for home with [facility] PT/[facility]    Dispo - Tentative plan for kyphoplasty on 5/24.     Tino Cruz APRN - CNP

## 2021-05-22 NOTE — PLAN OF CARE
Problem: Falls - Risk of:  Goal: Will remain free from falls  Description: Will remain free from falls  Outcome: Ongoing   High fall risk. Bed alarm on, call light within reach encouraged to call for assistance. Problem: Skin Integrity:  Goal: Will show no infection signs and symptoms  Description: Will show no infection signs and symptoms  Outcome: Ongoing   Turn patient every 2 hours and prn. Problem: Pain:  Goal: Pain level will decrease  Description: Pain level will decrease  Outcome: Ongoing   Monitor pain. Has pain medication and give prn.

## 2021-05-22 NOTE — PROGRESS NOTES
Patient complaining of wheezing, chest pressure rating 6-7/10. Norco given for pain. Called for stat EKG. Patient is wheezing. Family at bedside.  Will call MD. Nathan Garcia

## 2021-05-22 NOTE — PROGRESS NOTES
RESPIRATORY THERAPY ASSESSMENT FOR PRN PATIENTS    Name:  [address] [place] [address] Record Number:  [id number]  Age: 80 y.o.    Gender: female  : 1926  Today's Date:  2021  Room:  [address] [place] [address]-  Patient Admission Diagnosis      Allergies  Allergies   Allergen Reactions    Morphine And Related Shortness Of Breath     Other reaction(s): Hallucinations  palpitations & diaphoresis      Nitrofurantoin Shortness Of Breath    Ciprofloxacin Diarrhea    Codeine Other (See Comments)     HALLUCINATIONS    Sympathomimetics Other (See Comments)     sudafed = 'jumpy'       Vital Signs   /66   Pulse 59   Temp 97.9 °F (36.6 °C) (Oral)   Resp 16   Ht 5' 2\" (1.575 m)   Wt 157 lb 8 oz (71.4 kg)   SpO2 95%   BMI 28.81 kg/m²     Plan       Goals: medication delivery, mobilize retained secretions, volume expansion and improve oxygenation    Comments: na  Plan of Care: na    Patient/caregiver was educated on the proper method of use of Respiratory Therapy device:  Yes      Level of patient/caregiver understanding able to:   [] Verbalize understanding   [] Demonstrate understanding       [] Teach back        [] Needs reinforcement       []  No available caregiver               []  Other:     Response to education:  Very Good     Electronically signed by Jeremias Vann RCP on 2021 at 5:23 PM

## 2021-05-23 LAB
ANION GAP SERPL CALCULATED.3IONS-SCNC: 8 MMOL/L (ref 3–16)
BUN BLDV-MCNC: 13 MG/DL (ref 7–20)
CALCIUM SERPL-MCNC: 8.7 MG/DL (ref 8.3–10.6)
CHLORIDE BLD-SCNC: 97 MMOL/L (ref 99–110)
CO2: 26 MMOL/L (ref 21–32)
CREAT SERPL-MCNC: 1.2 MG/DL (ref 0.6–1.2)
EKG ATRIAL RATE: 55 BPM
EKG DIAGNOSIS: NORMAL
EKG P AXIS: 67 DEGREES
EKG P-R INTERVAL: 210 MS
EKG Q-T INTERVAL: 466 MS
EKG QRS DURATION: 102 MS
EKG QTC CALCULATION (BAZETT): 445 MS
EKG R AXIS: -28 DEGREES
EKG T AXIS: 65 DEGREES
EKG VENTRICULAR RATE: 55 BPM
GFR AFRICAN AMERICAN: 50
GFR NON-AFRICAN AMERICAN: 42
GLUCOSE BLD-MCNC: 90 MG/DL (ref 70–99)
HCT VFR BLD CALC: 25.5 % (ref 36–48)
HEMOGLOBIN: 8.6 G/DL (ref 12–16)
INR BLD: 1.15 (ref 0.86–1.14)
MCH RBC QN AUTO: 32.1 PG (ref 26–34)
MCHC RBC AUTO-ENTMCNC: 33.8 G/DL (ref 31–36)
MCV RBC AUTO: 95.1 FL (ref 80–100)
PDW BLD-RTO: 20.2 % (ref 12.4–15.4)
PLATELET # BLD: 210 K/UL (ref 135–450)
PMV BLD AUTO: 7.5 FL (ref 5–10.5)
POTASSIUM REFLEX MAGNESIUM: 4.8 MMOL/L (ref 3.5–5.1)
PROTHROMBIN TIME: 13.4 SEC (ref 10–13.2)
RBC # BLD: 2.68 M/UL (ref 4–5.2)
SODIUM BLD-SCNC: 131 MMOL/L (ref 136–145)
WBC # BLD: 4.7 K/UL (ref 4–11)

## 2021-05-23 PROCEDURE — 85610 PROTHROMBIN TIME: CPT

## 2021-05-23 PROCEDURE — 94761 N-INVAS EAR/PLS OXIMETRY MLT: CPT

## 2021-05-23 PROCEDURE — 80048 BASIC METABOLIC PNL TOTAL CA: CPT

## 2021-05-23 PROCEDURE — 6370000000 HC RX 637 (ALT 250 FOR IP): Performed by: INTERNAL MEDICINE

## 2021-05-23 PROCEDURE — 1200000000 HC SEMI PRIVATE

## 2021-05-23 PROCEDURE — 36415 COLL VENOUS BLD VENIPUNCTURE: CPT

## 2021-05-23 PROCEDURE — 2700000000 HC OXYGEN THERAPY PER DAY

## 2021-05-23 PROCEDURE — 85027 COMPLETE CBC AUTOMATED: CPT

## 2021-05-23 PROCEDURE — 2580000003 HC RX 258: Performed by: INTERNAL MEDICINE

## 2021-05-23 PROCEDURE — 93010 ELECTROCARDIOGRAM REPORT: CPT | Performed by: INTERNAL MEDICINE

## 2021-05-23 RX ADMIN — Medication 10 ML: at 09:02

## 2021-05-23 RX ADMIN — Medication 1000 UNITS: at 09:01

## 2021-05-23 RX ADMIN — GABAPENTIN 100 MG: 100 CAPSULE ORAL at 22:01

## 2021-05-23 RX ADMIN — DILTIAZEM HYDROCHLORIDE 300 MG: 300 CAPSULE, EXTENDED RELEASE ORAL at 09:01

## 2021-05-23 RX ADMIN — METOPROLOL TARTRATE 50 MG: 50 TABLET, FILM COATED ORAL at 09:01

## 2021-05-23 RX ADMIN — NEOMYCIN SULFATE, POLYMYXIN B SULFATE, HYDROCORTISONE 1 DROP: 3.5; 10000; 1 SOLUTION/ DROPS AURICULAR (OTIC) at 12:22

## 2021-05-23 RX ADMIN — LEVOTHYROXINE SODIUM 112 MCG: 0.11 TABLET ORAL at 06:47

## 2021-05-23 RX ADMIN — METHOCARBAMOL TABLETS 500 MG: 500 TABLET, COATED ORAL at 17:27

## 2021-05-23 RX ADMIN — METHOCARBAMOL TABLETS 500 MG: 500 TABLET, COATED ORAL at 09:01

## 2021-05-23 RX ADMIN — GABAPENTIN 100 MG: 100 CAPSULE ORAL at 13:48

## 2021-05-23 RX ADMIN — GABAPENTIN 100 MG: 100 CAPSULE ORAL at 09:01

## 2021-05-23 RX ADMIN — ROPINIROLE HYDROCHLORIDE 0.25 MG: 0.5 TABLET, FILM COATED ORAL at 22:02

## 2021-05-23 RX ADMIN — DOXAZOSIN 2 MG: 2 TABLET ORAL at 09:01

## 2021-05-23 RX ADMIN — METHOCARBAMOL TABLETS 500 MG: 500 TABLET, COATED ORAL at 22:02

## 2021-05-23 RX ADMIN — NEOMYCIN SULFATE, POLYMYXIN B SULFATE, HYDROCORTISONE 1 DROP: 3.5; 10000; 1 SOLUTION/ DROPS AURICULAR (OTIC) at 17:27

## 2021-05-23 RX ADMIN — HYDROCODONE BITARTRATE AND ACETAMINOPHEN 1 TABLET: 5; 325 TABLET ORAL at 08:09

## 2021-05-23 RX ADMIN — METHOCARBAMOL TABLETS 500 MG: 500 TABLET, COATED ORAL at 13:48

## 2021-05-23 RX ADMIN — Medication 10 ML: at 22:02

## 2021-05-23 RX ADMIN — METOPROLOL TARTRATE 50 MG: 50 TABLET, FILM COATED ORAL at 22:02

## 2021-05-23 RX ADMIN — PRAVASTATIN SODIUM 40 MG: 40 TABLET ORAL at 09:01

## 2021-05-23 ASSESSMENT — PAIN SCALES - GENERAL
PAINLEVEL_OUTOF10: 0
PAINLEVEL_OUTOF10: 4
PAINLEVEL_OUTOF10: 4
PAINLEVEL_OUTOF10: 0

## 2021-05-23 ASSESSMENT — PAIN DESCRIPTION - LOCATION: LOCATION: BACK;CHEST

## 2021-05-23 NOTE — PROGRESS NOTES
Hospitalist Progress Note      PCP: Jim Smith MD    Date of Admission: 5/16/2021    Chief Complaint: Near syncope     Hospital Course:   \"80 y.o. female with past medical history of stage III recurrent left breast cancer status post chemotherapy, hypertension, CKD stage III, hypothyroidism, baseline dementia presented to the ED after episodes of shaking that was witnessed by granddaughter who is a nurse. Patient reported that she remembers the episode where she felt like she is passing out but she did not pass out and was in the bathroom per niece patient was given tramadol and took a nap for an hour when she woke up she wanted to use the methadone that she helped her to use the bathroom. Patient sat down on the toilet after from standing and then scooted back in (the patient did not feel good after position change. Patient started shaking the whole body and her eyes opened with upper extremity flexed and lower extremity extended lasting for 30 minutes and patient was not responding verbally. 911 was called at the scene. Patient regained consciousness lasting for seconds. I also spoke with daughter reported the patient is DNR CC and has been having fatigue that worsened this past week and was sent back home so they can take care of her. Patient continues to be compliant with medications for recent bacterial pneumonia taking Augmentin for 7-day course. In regards to patient's breast cancer she continues to be on palbociclib 75 mg and monthly fulvestrant and trastuzumab, oncologist is in [facility]. Patient also has been complaining of mid back pain and rib pain that discontinued to persist with no known alleviating or exacerbating factor. \"    Subjective:   Pt is on 1 LPM. Afebrile. VSS. No dyspnea or chest pain. No N/V/D. +back pain which wraps around her sides.      Medications:  Reviewed    Infusion Medications    sodium chloride       Scheduled Medications    neomycin-polymyxin-hydrocortisone  1 drop Refill: Brisk,3 seconds, normal   Peripheral Pulses: +2 palpable, equal bilaterally       Labs:   Recent Labs     05/21/21  0743 05/22/21  0839 05/23/21  0600   WBC 5.2 4.7 4.7   HGB 8.6* 8.9* 8.6*   HCT 25.8* 26.4* 25.5*    196 210     Recent Labs     05/21/21  0742 05/22/21  0839 05/23/21  0600   * 132* 131*   K 5.0 5.2* 4.8    100 97*   CO2 24 27 26   BUN 10 11 13   CREATININE 0.9 1.1 1.2   CALCIUM 8.3 8.8 8.7     Urinalysis:      Lab Results   Component Value Date    NITRU Negative 05/16/2021    WBCUA 3-5 05/16/2021    BACTERIA 1+ 05/16/2021    RBCUA 0-2 05/16/2021    BLOODU Negative 05/16/2021    SPECGRAV >=1.030 05/16/2021    GLUCOSEU Negative 05/16/2021       Radiology:  XR CHEST PORTABLE   Final Result   Increased interstitial markings reflecting pulmonary vascular   congestion/interstitial edema versus an atypical pneumonia. Small bilateral pleural effusions and/or basilar atelectasis. Right basilar atelectasis versus airspace disease. IR INPATIENT CONSULT 36 MIN (FOR RADIOLOGIST USE ONLY)   Final Result      MRI BRAIN W WO CONTRAST   Final Result   No evidence for restricted diffusion to suggest acute infarct. Scattered white matter changes in the periventricular white matter and   subcortical white matter as well as in the tierra. No evidence for blood products on gradient imaging. No intra cranial mass hemorrhage or hematoma. No abnormal enhancement on postcontrast imaging. MRI THORACIC SPINE WO CONTRAST   Final Result   1. Acute/subacute T8 compression fracture demonstrating 70% loss of height. 2. Mild spinal canal stenosis at T8 secondary to 5 mm of retropulsion of the   posterior margin of the vertebral body into the spinal canal.   3. Interval development of the small bilateral pleural effusions. CT CHEST PULMONARY EMBOLISM W CONTRAST   Final Result   1. No pulmonary embolism.    2. Cardiomegaly with a small right pleural with vasovagal vs ?seizure. - CT head nonacute. Orthostatic vitals were negative. - Neurology consulted. Recommended MRI which showed no acute infarct, mass or hemorrhage. EEG unlikely to .  - ECHO showed normal LVEF of 55-60%, no RWMAs, LVDFP is elevated, mod mitral stenosis, mild MR and TR, severe aortic stenosis, mod AR, elevated SPAP at 63 mmHG consistent with severe PHTN. - Pt follows with Cardiology as an oupt, only wants medical treatment for her known AS as noted in her EMR.    - Continue to monitor on telemetry. Mild hypoxemia   - Likely multifactorial due to atelectasis and pulmonary edema/pleural effusions from IV fluids. No PE noted on chest CT on admission.  - Fluids dc'd. S/p x1 dose of 20 mg lasix on 5/21.   - Encourage pulmonary toilet with IS, cough, deep breathing, OOB/ambulation.   - Wean supplemental O2 as tolerated. She is on 1 LPM.      Previously diagnosed bacterial pneumonia  - CT chest did not show pneumonia. - Discontinued antibiotic on 5/18/21.  - Pt remains afebrile with normal WBC.      Chronic kidney disease stage 3  - Stable. Baseline creatinine 1.3-1.6.   - Monitor closely. - Avoid nephrotoxics as able.      Anemia  - Consistent with chronic disease.  - No overt signs of bleeding.   - Hgb stable. Monitor closely.      Hyponatremia (improved)  - Possibly hypovolemic.  - S/p IV fluids.   - Na remains stable.      Hypertension  - Variable control.   - Continue her home medications. - Monitor closely.      Stage III left breast cancer, recurrent  - On palbociclib 75 mg and monthly fulvestrant and trastuzumab    - Follows with oncologist at [facility]. - Palliative care has seen pt.     Pulmonary nodules and mediastinal lymphadenopathy  - Pt to follow up with Oncology. Informed family. Chronic cognitive impairment and underlying dementia  - Continue supportive care.       DVT Prophylaxis: SCDs  Diet: DIET GENERAL;  Diet NPO, After Midnight  Code Status: DNR-CC    PT/OT Eval Status: recs for home with [facility] PT/OT    Dispo - Tentative plan for kyphoplasty on 5/24. Possibly home on 5/25 pending PT/OT recs.     [address] [facility], APRN - CNP

## 2021-05-23 NOTE — PLAN OF CARE
Problem: Falls - Risk of:  Goal: Will remain free from falls  Description: Will remain free from falls  5/23/2021 0008 by Magdy Murrell RN  Outcome: Ongoing  Note: Pt will remain free from falls throughout hospital stay. Fall precautions in place, bed alarm on, bed in lowest position with wheels locked and side rails 2/4 up. Room door open and hourly rounding completed. Will continue to monitor throughout shift. Problem: Skin Integrity:  Goal: Will show no infection signs and symptoms  Description: Will show no infection signs and symptoms  5/23/2021 0008 by Magdy Murrell RN  Outcome: Ongoing  Note: Pt is at risk for skin breakdown. Pt will have skin assessments every shift, Q2 turns, heels elevated off of the bed, and friction and shear prevented when possible. Will continue to monitor for signs of skin breakdown and enforce prevention measures. Problem: Pain:  Goal: Pain level will decrease  Description: Pain level will decrease  5/23/2021 0008 by Magdy Murrell RN  Outcome: Ongoing  Note: Pt will be satisfied with pain control. Pt uses numeric pain rating scale with reassessments after pain med administration. Will continue to monitor progression throughout shift.

## 2021-05-24 LAB
ANION GAP SERPL CALCULATED.3IONS-SCNC: 4 MMOL/L (ref 3–16)
BUN BLDV-MCNC: 13 MG/DL (ref 7–20)
CALCIUM SERPL-MCNC: 8.7 MG/DL (ref 8.3–10.6)
CHLORIDE BLD-SCNC: 98 MMOL/L (ref 99–110)
CO2: 28 MMOL/L (ref 21–32)
CREAT SERPL-MCNC: 1 MG/DL (ref 0.6–1.2)
GFR AFRICAN AMERICAN: >60
GFR NON-AFRICAN AMERICAN: 51
GLUCOSE BLD-MCNC: 91 MG/DL (ref 70–99)
HCT VFR BLD CALC: 24 % (ref 36–48)
HEMOGLOBIN: 8.3 G/DL (ref 12–16)
MCH RBC QN AUTO: 32.6 PG (ref 26–34)
MCHC RBC AUTO-ENTMCNC: 34.4 G/DL (ref 31–36)
MCV RBC AUTO: 94.6 FL (ref 80–100)
PDW BLD-RTO: 20.3 % (ref 12.4–15.4)
PLATELET # BLD: 202 K/UL (ref 135–450)
PMV BLD AUTO: 7.1 FL (ref 5–10.5)
POTASSIUM REFLEX MAGNESIUM: 5.1 MMOL/L (ref 3.5–5.1)
RBC # BLD: 2.53 M/UL (ref 4–5.2)
SODIUM BLD-SCNC: 130 MMOL/L (ref 136–145)
WBC # BLD: 4.6 K/UL (ref 4–11)

## 2021-05-24 PROCEDURE — 6370000000 HC RX 637 (ALT 250 FOR IP): Performed by: INTERNAL MEDICINE

## 2021-05-24 PROCEDURE — 94761 N-INVAS EAR/PLS OXIMETRY MLT: CPT

## 2021-05-24 PROCEDURE — 2700000000 HC OXYGEN THERAPY PER DAY

## 2021-05-24 PROCEDURE — 36415 COLL VENOUS BLD VENIPUNCTURE: CPT

## 2021-05-24 PROCEDURE — 2580000003 HC RX 258: Performed by: INTERNAL MEDICINE

## 2021-05-24 PROCEDURE — 80048 BASIC METABOLIC PNL TOTAL CA: CPT

## 2021-05-24 PROCEDURE — 1200000000 HC SEMI PRIVATE

## 2021-05-24 PROCEDURE — 85027 COMPLETE CBC AUTOMATED: CPT

## 2021-05-24 RX ADMIN — HYDROCODONE BITARTRATE AND ACETAMINOPHEN 1 TABLET: 5; 325 TABLET ORAL at 09:52

## 2021-05-24 RX ADMIN — METHOCARBAMOL TABLETS 500 MG: 500 TABLET, COATED ORAL at 09:52

## 2021-05-24 RX ADMIN — DOXAZOSIN 2 MG: 2 TABLET ORAL at 09:52

## 2021-05-24 RX ADMIN — HYDROCODONE BITARTRATE AND ACETAMINOPHEN 1 TABLET: 5; 325 TABLET ORAL at 16:17

## 2021-05-24 RX ADMIN — GABAPENTIN 100 MG: 100 CAPSULE ORAL at 20:48

## 2021-05-24 RX ADMIN — LEVOTHYROXINE SODIUM 112 MCG: 0.11 TABLET ORAL at 05:47

## 2021-05-24 RX ADMIN — Medication 10 ML: at 20:48

## 2021-05-24 RX ADMIN — METHOCARBAMOL TABLETS 500 MG: 500 TABLET, COATED ORAL at 16:17

## 2021-05-24 RX ADMIN — METHOCARBAMOL TABLETS 500 MG: 500 TABLET, COATED ORAL at 20:48

## 2021-05-24 RX ADMIN — DILTIAZEM HYDROCHLORIDE 300 MG: 300 CAPSULE, EXTENDED RELEASE ORAL at 09:52

## 2021-05-24 RX ADMIN — GABAPENTIN 100 MG: 100 CAPSULE ORAL at 09:52

## 2021-05-24 RX ADMIN — Medication 10 ML: at 09:53

## 2021-05-24 RX ADMIN — PRAVASTATIN SODIUM 40 MG: 40 TABLET ORAL at 09:52

## 2021-05-24 RX ADMIN — METOPROLOL TARTRATE 50 MG: 50 TABLET, FILM COATED ORAL at 09:52

## 2021-05-24 RX ADMIN — METOPROLOL TARTRATE 50 MG: 50 TABLET, FILM COATED ORAL at 20:48

## 2021-05-24 RX ADMIN — ROPINIROLE HYDROCHLORIDE 0.25 MG: 0.5 TABLET, FILM COATED ORAL at 20:48

## 2021-05-24 RX ADMIN — Medication 1000 UNITS: at 09:52

## 2021-05-24 ASSESSMENT — PAIN SCALES - GENERAL: PAINLEVEL_OUTOF10: 0

## 2021-05-24 NOTE — PROGRESS NOTES
[facility]  INTERVENTIONAL-RADIOLOGY    Patient Progress Note  **To be billed as [id number]    Name: Jakub Geronimo  :  1926  MRN: [id number]  Date: 21      CC:   Chief Complaint   Patient presents with    Seizures     witnessed Shaking episode \"possible seizure\" per granddaughter. Pt states she never lost consciousness Recent admission here for pneumonia    Nausea       ASSESSMENT/PLAN  Compression fracture of body of thoracic vertebra (HCC)  -Plan was for Kyphoplasty of T8 today  -Hybrid room was scheduled for 1430  -Previous case in hybrid room went over and was going to be several hours later  -Pt had been NPO all day  -Hybrid room was not available tomorrow for kyphoplasty  -In effort to expedite procedure and better patient care, patient will be transferred to [place] [id number] for Kyphoplasty tomorrow with Dr Paige Gipson  -NPO after MN  -Pt agreeable     Generalized weakness  -remains weak  -expediting kyphoplasty will expedite therapy     Syncope and collapse  -resolved  -per primary     HTN  -stable  -per primary      SUBJECTIVE     She reports she still feels weak  Pain is improved if she isnt moved often  Eager for Kypho tomorrow  She is agreeable for transfer    XR CHEST PORTABLE   Final Result   Increased interstitial markings reflecting pulmonary vascular   congestion/interstitial edema versus an atypical pneumonia. Small bilateral pleural effusions and/or basilar atelectasis. Right basilar atelectasis versus airspace disease. IR INPATIENT CONSULT 36 MIN (FOR RADIOLOGIST USE ONLY)   Final Result      MRI BRAIN W WO CONTRAST   Final Result   No evidence for restricted diffusion to suggest acute infarct. Scattered white matter changes in the periventricular white matter and   subcortical white matter as well as in the tierra. No evidence for blood products on gradient imaging. No intra cranial mass hemorrhage or hematoma.       No abnormal enhancement on postcontrast imaging. MRI THORACIC SPINE WO CONTRAST   Final Result   1. Acute/subacute T8 compression fracture demonstrating 70% loss of height. 2. Mild spinal canal stenosis at T8 secondary to 5 mm of retropulsion of the   posterior margin of the vertebral body into the spinal canal.   3. Interval development of the small bilateral pleural effusions. CT CHEST PULMONARY EMBOLISM W CONTRAST   Final Result   1. No pulmonary embolism. 2. Cardiomegaly with a small right pleural effusion and mild interstitial   edema in the lung bases. 3. Small scattered bilateral pulmonary nodules without significant change   from 03/05/2021 measuring up to approximately 6 mm. 4. Unchanged mediastinal and right hilar lymphadenopathy. Stable to mildly   improved left axillary lymphadenopathy. 5. Moderate T8 vertebral body compression fracture new from 03/05/2021 with 5   mm retropulsion. If clinically indicated further evaluation could be   obtained with MRI. RECOMMENDATIONS:   Fleischner Society guidelines for follow-up and management of incidentally   detected pulmonary nodules:      Multiple Solid Nodules:      Nodule size less than 6 mm   In a high-risk patient, optional CT at 12 months. - Low risk patients include individuals with minimal or absent history of   smoking and other known risk factors. - High risk patients include individuals with a history or smoking or known   risk factors. Radiology 2017 http://pubs. rsna.org/doi/full/10.1148/radiol. 8587149576         CT HEAD WO CONTRAST   Final Result   No acute intracranial abnormality. Mild paranasal and left mastoid air cell disease. XR CHEST PORTABLE   Final Result   No acute process. Improved aeration right lung base.          IR KYPHOPLASTY THORACIC 1 VERTEBRAL BODY    (Results Pending)         Vitals:    05/24/21 1530   BP: (!) 150/70   Pulse: 54   Resp: 16   Temp: 98.4 °F (36.9 °C)   SpO2: 94%     Current discharge. Cardiovascular:      Rate and Rhythm: Normal rate and regular rhythm. Heart sounds: Normal heart sounds. No murmur heard. No friction rub. No gallop. Pulmonary:      Effort: Pulmonary effort is normal. No respiratory distress. Breath sounds: Normal breath sounds. No wheezing or rales. Musculoskeletal:         General: Normal range of motion. Cervical back: Normal range of motion and neck supple. Thoracic back: Bony tenderness (T8) present. Skin:     General: Skin is warm and dry. Neurological:      Mental Status: She is alert and oriented to person, place, and time.    Psychiatric:         Behavior: Behavior normal.           PEARL BEGUM PA-C  05/24/21 4:28 PM

## 2021-05-24 NOTE — PROGRESS NOTES
Start Oral Diet, Start Oral Nutrition Supplement  Nutrition Education/Counseling:  No recommendation at this time   Coordination of Nutrition Care:  Continue to monitor while inpatient    Goals:  Pt will tolerate and consume greater than 50% of meals and ONS this admission       Nutrition Monitoring and Evaluation:   Behavioral-Environmental Outcomes:  None Identified   Food/Nutrient Intake Outcomes:  Food and Nutrient Intake, Supplement Intake, Diet Advancement/Tolerance  Physical Signs/Symptoms Outcomes:  Biochemical Data, Nutrition Focused Physical Findings, Weight     Discharge Planning:     Too soon to determine     Electronically signed by Eda Paget, MS, RD, LD on 5/24/21 at 1:15 PM EDT    Contact: [phone]

## 2021-05-24 NOTE — PROGRESS NOTES
Due to delays in the hybrid OR we are unable to do kyphoplasty today, we attempted to reserve time in the hybrid OR as well as cath lab tomorrow without success. Pt can be transferred to [facility] and have procedure done there tomorrow. Spoke with Dr Quinton Gusman and she will speak with transfer center to see if the hospitalist will accept pt at [facility]. Pt and family agreeable with plan to transfer to [facility] to get procedure done as soon as possible.

## 2021-05-24 NOTE — CARE COORDINATION
Pt to have Kypho today. Once completed, pt will need updated PT/OT recs. Family in agreement to SNF if rec'd, otherwise family will assist at home and pt will d/c home w/HHC.   Shravan Ortiz RN

## 2021-05-24 NOTE — PLAN OF CARE
Problem: Falls - Risk of:  Goal: Will remain free from falls  Description: Will remain free from falls  5/24/2021 0419 by Henry Wilson RN  Outcome: Ongoing  Note: Pt will remain free from falls throughout hospital stay. Fall precautions in place, bed alarm on, bed in lowest position with wheels locked and side rails 2/4 up. Room door open and hourly rounding completed. Will continue to monitor throughout shift. Problem: Pain:  Goal: Pain level will decrease  Description: Pain level will decrease  5/24/2021 0419 by Henry Wilson RN  Outcome: Ongoing  Note: Pt will be satisfied with pain control. Pt uses numeric pain rating scale with reassessments after pain med administration. Will continue to monitor progression throughout shift.

## 2021-05-24 NOTE — PROGRESS NOTES
Hospitalist Progress Note      PCP: Aileen Morgan MD    Date of Admission: 5/16/2021    Chief Complaint: Near syncope     Hospital Course:   \"80 y.o. female with past medical history of stage III recurrent left breast cancer status post chemotherapy, hypertension, CKD stage III, hypothyroidism, baseline dementia presented to the ED after episodes of shaking that was witnessed by granddaughter who is a nurse. Patient reported that she remembers the episode where she felt like she is passing out but she did not pass out and was in the bathroom per niece patient was given tramadol and took a nap for an hour when she woke up she wanted to use the methadone that she helped her to use the bathroom. Patient sat down on the toilet after from standing and then scooted back in (the patient did not feel good after position change. Patient started shaking the whole body and her eyes opened with upper extremity flexed and lower extremity extended lasting for 30 minutes and patient was not responding verbally. 911 was called at the scene. Patient regained consciousness lasting for seconds. I also spoke with daughter reported the patient is DNR CC and has been having fatigue that worsened this past week and was sent back home so they can take care of her. Patient continues to be compliant with medications for recent bacterial pneumonia taking Augmentin for 7-day course. In regards to patient's breast cancer she continues to be on palbociclib 75 mg and monthly fulvestrant and trastuzumab, oncologist is in [facility]. Patient also has been complaining of mid back pain and rib pain that discontinued to persist with no known alleviating or exacerbating factor. \"    Subjective:   As backache no shooting pain, mild numbness over the right shin that is chronic. No nausea vomiting abdominal pain or any urinary complaints.     Medications:  Reviewed    Infusion Medications    sodium chloride       Scheduled Medications    neomycin-polymyxin-hydrocortisone  1 drop Left Ear 4 times per day    gabapentin  100 mg Oral TID    lidocaine  1 patch Transdermal Daily    methocarbamol  500 mg Oral 4x Daily    sodium chloride flush  10 mL Intravenous 2 times per day    [Held by provider] clopidogrel  75 mg Oral Daily    dilTIAZem  300 mg Oral Daily    levothyroxine  112 mcg Oral Daily    metoprolol tartrate  50 mg Oral BID    pravastatin  40 mg Oral Daily    rOPINIRole  0.25 mg Oral Nightly    doxazosin  2 mg Oral Daily    Vitamin D  1,000 Units Oral Daily     PRN Meds: ipratropium-albuterol, HYDROcodone 5 mg - acetaminophen, sodium chloride flush, sodium chloride, promethazine **OR** ondansetron, acetaminophen **OR** acetaminophen, labetalol      Intake/Output Summary (Last 24 hours) at 5/24/2021 0797  Last data filed at 5/24/2021 0531  Gross per 24 hour   Intake 600 ml   Output 1000 ml   Net -400 ml       Physical Exam Performed:    BP (!) 144/63   Pulse 62   Temp 98.2 °F (36.8 °C) (Oral)   Resp 18   Ht 5' 2\" (1.575 m)   Wt 155 lb 3.2 oz (70.4 kg)   SpO2 95%   BMI 28.39 kg/m²     General appearance: No apparent distress, appears stated age and cooperative. HEENT: Pupils equal, round, and reactive to light. Conjunctivae/corneas clear. Neck: Supple, with full range of motion. No jugular venous distention. Trachea midline. Respiratory:  Normal respiratory effort. Clear to auscultation, bilaterally without Rales/Wheezes/Rhonchi. Cardiovascular: Regular rate and rhythm with normal S1/S2 without murmurs, rubs or gallops. Abdomen: Soft, non-tender, non-distended with normal bowel sounds. Musculoskeletal: No clubbing, cyanosis or edema bilaterally. Back pain   skin: Skin color, texture, turgor normal.  No rashes or lesions. Neurologic:  Neurovascularly intact without any focal sensory/motor deficits.  Cranial nerves: II-XII intact, grossly non-focal.  Psychiatric: Alert and oriented, thought content appropriate, normal insight  Capillary Refill: Brisk,3 seconds, normal   Peripheral Pulses: +2 palpable, equal bilaterally       Labs:   Recent Labs     05/22/21  0839 05/23/21  0600 05/24/21  0508   WBC 4.7 4.7 4.6   HGB 8.9* 8.6* 8.3*   HCT 26.4* 25.5* 24.0*    210 202     Recent Labs     05/22/21  0839 05/23/21  0600 05/24/21  0509   * 131* 130*   K 5.2* 4.8 5.1    97* 98*   CO2 27 26 28   BUN 11 13 13   CREATININE 1.1 1.2 1.0   CALCIUM 8.8 8.7 8.7     Urinalysis:      Lab Results   Component Value Date    NITRU Negative 05/16/2021    WBCUA 3-5 05/16/2021    BACTERIA 1+ 05/16/2021    RBCUA 0-2 05/16/2021    BLOODU Negative 05/16/2021    SPECGRAV >=1.030 05/16/2021    GLUCOSEU Negative 05/16/2021       Radiology:  XR CHEST PORTABLE   Final Result   Increased interstitial markings reflecting pulmonary vascular   congestion/interstitial edema versus an atypical pneumonia. Small bilateral pleural effusions and/or basilar atelectasis. Right basilar atelectasis versus airspace disease. IR INPATIENT CONSULT 36 MIN (FOR RADIOLOGIST USE ONLY)   Final Result      MRI BRAIN W WO CONTRAST   Final Result   No evidence for restricted diffusion to suggest acute infarct. Scattered white matter changes in the periventricular white matter and   subcortical white matter as well as in the tierra. No evidence for blood products on gradient imaging. No intra cranial mass hemorrhage or hematoma. No abnormal enhancement on postcontrast imaging. MRI THORACIC SPINE WO CONTRAST   Final Result   1. Acute/subacute T8 compression fracture demonstrating 70% loss of height. 2. Mild spinal canal stenosis at T8 secondary to 5 mm of retropulsion of the   posterior margin of the vertebral body into the spinal canal.   3. Interval development of the small bilateral pleural effusions. CT CHEST PULMONARY EMBOLISM W CONTRAST   Final Result   1. No pulmonary embolism.    2. Cardiomegaly with a small right pleural effusion and mild interstitial   edema in the lung bases. 3. Small scattered bilateral pulmonary nodules without significant change   from 03/05/2021 measuring up to approximately 6 mm. 4. Unchanged mediastinal and right hilar lymphadenopathy. Stable to mildly   improved left axillary lymphadenopathy. 5. Moderate T8 vertebral body compression fracture new from 03/05/2021 with 5   mm retropulsion. If clinically indicated further evaluation could be   obtained with MRI. RECOMMENDATIONS:   Fleischner Society guidelines for follow-up and management of incidentally   detected pulmonary nodules:      Multiple Solid Nodules:      Nodule size less than 6 mm   In a high-risk patient, optional CT at 12 months. - Low risk patients include individuals with minimal or absent history of   smoking and other known risk factors. - High risk patients include individuals with a history or smoking or known   risk factors. Radiology 2017 http://pubs. rsna.org/doi/full/10.1148/radiol. 6907103853         CT HEAD WO CONTRAST   Final Result   No acute intracranial abnormality. Mild paranasal and left mastoid air cell disease. XR CHEST PORTABLE   Final Result   No acute process. Improved aeration right lung base. IR KYPHOPLASTY THORACIC 1 VERTEBRAL BODY    (Results Pending)       Assessment/Plan:    Active Hospital Problems    Diagnosis     Syncope and collapse [R55]     Compression fracture of body of thoracic vertebra (HCC) [S22.000A]     Generalized weakness [R53.1]     HTN (hypertension), benign [I10]     Compression fracture of thoracic spine, non-traumatic, sequela [M48.54XS]        Non-traumatic acute T8 compression fracture   - Spine surgery consulted who recommended bracing vs kyphoplasty. Pt wants to proceed with kyphoplasty. IR consulted.  Tentative plan for kypho on 5/24.  - Continue gabapentin, robaxin, lidocaine patch and norco as needed.     Near syncope   - PT/OT    Dispo - Tentative plan for kyphoplasty on 5/24. Possibly home on 5/25 pending PT/OT recs.     Meri Yeh MD

## 2021-05-24 NOTE — PROGRESS NOTES
Pt is to be transferred to [facility] in the morning for kyphoplasty with Dr Jerry Ly. Procedure is scheduled for 1030. The transfer center is to set up transport for the pt so that she is at [place] no later than 0930. [place] clinical will assign bed for pt in the morning. Dr Jayden Yusuf and primary RN updated on plan and they will update pt and family. Please call clinical  at [place] for questions or updates overnight.

## 2021-05-24 NOTE — PROGRESS NOTES
Juan Costa RN received call that kyphoplasty will not be able to completed today. RN was informed that another possibility would be to have procedure completed at [facility]. RN notified family, and gave patient diet order.

## 2021-05-24 NOTE — PROGRESS NOTES
Patient to be at [facility] by 0900 to have a procedure at 10 am with Dr. Felipe Alcantar. Patient will then be admitted to [facility] on med-surg after the procedure.     Darrian MAO  5/24/2021

## 2021-05-24 NOTE — PROGRESS NOTES
Occupational Therapy   Treatment Attempt Note    Attempted OT treatment this date, however pt to have kyphoplasty this date. Will re-attempt next date as schedule permits and pt medically stable for re-assessment s/p kyphoplasty.      Frederick Campo, OTR/L

## 2021-05-25 ENCOUNTER — APPOINTMENT (OUTPATIENT)
Dept: INTERVENTIONAL RADIOLOGY/VASCULAR | Age: 86
DRG: 516 | End: 2021-05-25
Payer: MEDICARE

## 2021-05-25 ENCOUNTER — HOSPITAL ENCOUNTER (INPATIENT)
Age: 86
LOS: 3 days | Discharge: OTHER INSTITUTION | DRG: 516 | End: 2021-05-28
Attending: INTERNAL MEDICINE | Admitting: INTERNAL MEDICINE
Payer: MEDICARE

## 2021-05-25 VITALS
HEIGHT: 62 IN | SYSTOLIC BLOOD PRESSURE: 127 MMHG | RESPIRATION RATE: 18 BRPM | HEART RATE: 67 BPM | DIASTOLIC BLOOD PRESSURE: 64 MMHG | OXYGEN SATURATION: 93 % | WEIGHT: 153.6 LBS | TEMPERATURE: 97.9 F | BODY MASS INDEX: 28.26 KG/M2

## 2021-05-25 DIAGNOSIS — M48.54XS COMPRESSION FRACTURE OF THORACIC SPINE, NON-TRAUMATIC, SEQUELA: ICD-10-CM

## 2021-05-25 LAB
ANION GAP SERPL CALCULATED.3IONS-SCNC: 8 MMOL/L (ref 3–16)
BUN BLDV-MCNC: 14 MG/DL (ref 7–20)
CALCIUM SERPL-MCNC: 8.3 MG/DL (ref 8.3–10.6)
CHLORIDE BLD-SCNC: 99 MMOL/L (ref 99–110)
CO2: 28 MMOL/L (ref 21–32)
CREAT SERPL-MCNC: 1.1 MG/DL (ref 0.6–1.2)
GFR AFRICAN AMERICAN: 56
GFR NON-AFRICAN AMERICAN: 46
GLUCOSE BLD-MCNC: 85 MG/DL (ref 70–99)
HCT VFR BLD CALC: 24.8 % (ref 36–48)
HEMOGLOBIN: 8.5 G/DL (ref 12–16)
MCH RBC QN AUTO: 32.4 PG (ref 26–34)
MCHC RBC AUTO-ENTMCNC: 34.3 G/DL (ref 31–36)
MCV RBC AUTO: 94.4 FL (ref 80–100)
PDW BLD-RTO: 20 % (ref 12.4–15.4)
PLATELET # BLD: 213 K/UL (ref 135–450)
PMV BLD AUTO: 7 FL (ref 5–10.5)
POTASSIUM REFLEX MAGNESIUM: 4.8 MMOL/L (ref 3.5–5.1)
RBC # BLD: 2.63 M/UL (ref 4–5.2)
SODIUM BLD-SCNC: 135 MMOL/L (ref 136–145)
WBC # BLD: 3.8 K/UL (ref 4–11)

## 2021-05-25 PROCEDURE — 6370000000 HC RX 637 (ALT 250 FOR IP): Performed by: INTERNAL MEDICINE

## 2021-05-25 PROCEDURE — 0PS43ZZ REPOSITION THORACIC VERTEBRA, PERCUTANEOUS APPROACH: ICD-10-PCS | Performed by: RADIOLOGY

## 2021-05-25 PROCEDURE — 6360000002 HC RX W HCPCS: Performed by: RADIOLOGY

## 2021-05-25 PROCEDURE — 99152 MOD SED SAME PHYS/QHP 5/>YRS: CPT

## 2021-05-25 PROCEDURE — 0PU43JZ SUPPLEMENT THORACIC VERTEBRA WITH SYNTHETIC SUBSTITUTE, PERCUTANEOUS APPROACH: ICD-10-PCS | Performed by: RADIOLOGY

## 2021-05-25 PROCEDURE — 6370000000 HC RX 637 (ALT 250 FOR IP): Performed by: PHYSICIAN ASSISTANT

## 2021-05-25 PROCEDURE — 80048 BASIC METABOLIC PNL TOTAL CA: CPT

## 2021-05-25 PROCEDURE — C1713 ANCHOR/SCREW BN/BN,TIS/BN: HCPCS

## 2021-05-25 PROCEDURE — 6370000000 HC RX 637 (ALT 250 FOR IP): Performed by: NURSE PRACTITIONER

## 2021-05-25 PROCEDURE — 22513 PERQ VERTEBRAL AUGMENTATION: CPT

## 2021-05-25 PROCEDURE — G0379 DIRECT REFER HOSPITAL OBSERV: HCPCS

## 2021-05-25 PROCEDURE — 2060000000 HC ICU INTERMEDIATE R&B

## 2021-05-25 PROCEDURE — 36415 COLL VENOUS BLD VENIPUNCTURE: CPT

## 2021-05-25 PROCEDURE — 2700000000 HC OXYGEN THERAPY PER DAY

## 2021-05-25 PROCEDURE — 1200000000 HC SEMI PRIVATE

## 2021-05-25 PROCEDURE — 2580000003 HC RX 258: Performed by: NURSE PRACTITIONER

## 2021-05-25 PROCEDURE — G0378 HOSPITAL OBSERVATION PER HR: HCPCS

## 2021-05-25 PROCEDURE — 85027 COMPLETE CBC AUTOMATED: CPT

## 2021-05-25 PROCEDURE — 94761 N-INVAS EAR/PLS OXIMETRY MLT: CPT

## 2021-05-25 RX ORDER — HYDROCODONE BITARTRATE AND ACETAMINOPHEN 5; 325 MG/1; MG/1
1 TABLET ORAL EVERY 6 HOURS PRN
Qty: 3 TABLET | Refills: 0 | Status: ON HOLD | OUTPATIENT
Start: 2021-05-25 | End: 2021-05-28 | Stop reason: SDUPTHER

## 2021-05-25 RX ORDER — METHOCARBAMOL 500 MG/1
500 TABLET, FILM COATED ORAL 4 TIMES DAILY
Status: DISCONTINUED | OUTPATIENT
Start: 2021-05-25 | End: 2021-05-26

## 2021-05-25 RX ORDER — ACETAMINOPHEN 325 MG/1
650 TABLET ORAL EVERY 4 HOURS PRN
Status: DISCONTINUED | OUTPATIENT
Start: 2021-05-25 | End: 2021-05-25 | Stop reason: SDUPTHER

## 2021-05-25 RX ORDER — SODIUM CHLORIDE 9 MG/ML
25 INJECTION, SOLUTION INTRAVENOUS PRN
Status: DISCONTINUED | OUTPATIENT
Start: 2021-05-25 | End: 2021-05-28 | Stop reason: HOSPADM

## 2021-05-25 RX ORDER — DILTIAZEM HYDROCHLORIDE 300 MG/1
300 CAPSULE, COATED, EXTENDED RELEASE ORAL DAILY
Status: DISCONTINUED | OUTPATIENT
Start: 2021-05-26 | End: 2021-05-28 | Stop reason: HOSPADM

## 2021-05-25 RX ORDER — GABAPENTIN 100 MG/1
100 CAPSULE ORAL 3 TIMES DAILY
Status: DISCONTINUED | OUTPATIENT
Start: 2021-05-25 | End: 2021-05-28

## 2021-05-25 RX ORDER — LIDOCAINE 4 G/G
1 PATCH TOPICAL DAILY
Status: DISCONTINUED | OUTPATIENT
Start: 2021-05-26 | End: 2021-05-28 | Stop reason: HOSPADM

## 2021-05-25 RX ORDER — SODIUM CHLORIDE 0.9 % (FLUSH) 0.9 %
10 SYRINGE (ML) INJECTION PRN
Status: DISCONTINUED | OUTPATIENT
Start: 2021-05-25 | End: 2021-05-28 | Stop reason: HOSPADM

## 2021-05-25 RX ORDER — ONDANSETRON 2 MG/ML
4 INJECTION INTRAMUSCULAR; INTRAVENOUS EVERY 6 HOURS PRN
Status: DISCONTINUED | OUTPATIENT
Start: 2021-05-25 | End: 2021-05-28 | Stop reason: HOSPADM

## 2021-05-25 RX ORDER — HYDROCODONE BITARTRATE AND ACETAMINOPHEN 5; 325 MG/1; MG/1
1 TABLET ORAL EVERY 6 HOURS PRN
Status: DISCONTINUED | OUTPATIENT
Start: 2021-05-25 | End: 2021-05-28 | Stop reason: HOSPADM

## 2021-05-25 RX ORDER — ACETAMINOPHEN 325 MG/1
650 TABLET ORAL EVERY 6 HOURS PRN
Status: DISCONTINUED | OUTPATIENT
Start: 2021-05-25 | End: 2021-05-28 | Stop reason: HOSPADM

## 2021-05-25 RX ORDER — KETOROLAC TROMETHAMINE 30 MG/ML
INJECTION, SOLUTION INTRAMUSCULAR; INTRAVENOUS
Status: COMPLETED | OUTPATIENT
Start: 2021-05-25 | End: 2021-05-25

## 2021-05-25 RX ORDER — FENTANYL CITRATE 50 UG/ML
INJECTION, SOLUTION INTRAMUSCULAR; INTRAVENOUS
Status: COMPLETED | OUTPATIENT
Start: 2021-05-25 | End: 2021-05-25

## 2021-05-25 RX ORDER — ONDANSETRON 2 MG/ML
4 INJECTION INTRAMUSCULAR; INTRAVENOUS EVERY 8 HOURS PRN
Status: DISCONTINUED | OUTPATIENT
Start: 2021-05-25 | End: 2021-05-25 | Stop reason: SDUPTHER

## 2021-05-25 RX ORDER — ACETAMINOPHEN 650 MG/1
650 SUPPOSITORY RECTAL EVERY 6 HOURS PRN
Status: DISCONTINUED | OUTPATIENT
Start: 2021-05-25 | End: 2021-05-28 | Stop reason: HOSPADM

## 2021-05-25 RX ORDER — VITAMIN B COMPLEX
1000 TABLET ORAL DAILY
Status: DISCONTINUED | OUTPATIENT
Start: 2021-05-26 | End: 2021-05-28 | Stop reason: HOSPADM

## 2021-05-25 RX ORDER — FERROUS SULFATE 325(65) MG
325 TABLET ORAL
COMMUNITY

## 2021-05-25 RX ORDER — CALCIUM CARBONATE 200(500)MG
500 TABLET,CHEWABLE ORAL 2 TIMES DAILY
Status: DISCONTINUED | OUTPATIENT
Start: 2021-05-25 | End: 2021-05-28 | Stop reason: HOSPADM

## 2021-05-25 RX ORDER — MIDAZOLAM HYDROCHLORIDE 5 MG/ML
INJECTION INTRAMUSCULAR; INTRAVENOUS
Status: COMPLETED | OUTPATIENT
Start: 2021-05-25 | End: 2021-05-25

## 2021-05-25 RX ORDER — DOXAZOSIN 2 MG/1
2 TABLET ORAL DAILY
Status: DISCONTINUED | OUTPATIENT
Start: 2021-05-26 | End: 2021-05-28 | Stop reason: HOSPADM

## 2021-05-25 RX ORDER — LABETALOL HYDROCHLORIDE 5 MG/ML
10 INJECTION, SOLUTION INTRAVENOUS EVERY 4 HOURS PRN
Status: DISCONTINUED | OUTPATIENT
Start: 2021-05-25 | End: 2021-05-26

## 2021-05-25 RX ORDER — PRAVASTATIN SODIUM 40 MG
40 TABLET ORAL DAILY
Status: DISCONTINUED | OUTPATIENT
Start: 2021-05-26 | End: 2021-05-28 | Stop reason: HOSPADM

## 2021-05-25 RX ORDER — PROMETHAZINE HYDROCHLORIDE 25 MG/1
12.5 TABLET ORAL EVERY 6 HOURS PRN
Status: DISCONTINUED | OUTPATIENT
Start: 2021-05-25 | End: 2021-05-28 | Stop reason: HOSPADM

## 2021-05-25 RX ORDER — GABAPENTIN 100 MG/1
100 CAPSULE ORAL 3 TIMES DAILY
Qty: 3 CAPSULE | Refills: 0 | Status: ON HOLD | DISCHARGE
Start: 2021-05-25 | End: 2021-05-28 | Stop reason: HOSPADM

## 2021-05-25 RX ORDER — METHOCARBAMOL 500 MG/1
500 TABLET, FILM COATED ORAL 4 TIMES DAILY
Qty: 40 TABLET | Refills: 0 | DISCHARGE
Start: 2021-05-25 | End: 2021-06-04

## 2021-05-25 RX ORDER — METOPROLOL TARTRATE 50 MG/1
50 TABLET, FILM COATED ORAL 2 TIMES DAILY
Status: DISCONTINUED | OUTPATIENT
Start: 2021-05-25 | End: 2021-05-28 | Stop reason: HOSPADM

## 2021-05-25 RX ORDER — IPRATROPIUM BROMIDE AND ALBUTEROL SULFATE 2.5; .5 MG/3ML; MG/3ML
1 SOLUTION RESPIRATORY (INHALATION) EVERY 4 HOURS PRN
Status: DISCONTINUED | OUTPATIENT
Start: 2021-05-25 | End: 2021-05-28 | Stop reason: HOSPADM

## 2021-05-25 RX ORDER — LEVOTHYROXINE SODIUM 112 UG/1
112 TABLET ORAL DAILY
Status: DISCONTINUED | OUTPATIENT
Start: 2021-05-26 | End: 2021-05-28 | Stop reason: HOSPADM

## 2021-05-25 RX ORDER — IPRATROPIUM BROMIDE AND ALBUTEROL SULFATE 2.5; .5 MG/3ML; MG/3ML
3 SOLUTION RESPIRATORY (INHALATION) EVERY 4 HOURS PRN
Qty: 360 ML | DISCHARGE
Start: 2021-05-25

## 2021-05-25 RX ORDER — SODIUM CHLORIDE 0.9 % (FLUSH) 0.9 %
10 SYRINGE (ML) INJECTION EVERY 12 HOURS SCHEDULED
Status: DISCONTINUED | OUTPATIENT
Start: 2021-05-25 | End: 2021-05-28 | Stop reason: HOSPADM

## 2021-05-25 RX ORDER — ROPINIROLE 0.25 MG/1
0.25 TABLET, FILM COATED ORAL NIGHTLY
Status: DISCONTINUED | OUTPATIENT
Start: 2021-05-25 | End: 2021-05-28 | Stop reason: HOSPADM

## 2021-05-25 RX ORDER — NEOMYCIN SULFATE, POLYMYXIN B SULFATE, HYDROCORTISONE 3.5; 10000; 1 MG/ML; [USP'U]/ML; MG/ML
1 SOLUTION/ DROPS AURICULAR (OTIC) EVERY 6 HOURS SCHEDULED
Status: DISCONTINUED | OUTPATIENT
Start: 2021-05-25 | End: 2021-05-28 | Stop reason: HOSPADM

## 2021-05-25 RX ADMIN — Medication 1000 UNITS: at 07:59

## 2021-05-25 RX ADMIN — GABAPENTIN 100 MG: 100 CAPSULE ORAL at 07:59

## 2021-05-25 RX ADMIN — KETOROLAC TROMETHAMINE 15 MG: 30 INJECTION, SOLUTION INTRAMUSCULAR at 10:50

## 2021-05-25 RX ADMIN — LEVOTHYROXINE SODIUM 112 MCG: 0.11 TABLET ORAL at 07:38

## 2021-05-25 RX ADMIN — Medication 2000 MG: at 10:50

## 2021-05-25 RX ADMIN — GABAPENTIN 100 MG: 100 CAPSULE ORAL at 21:18

## 2021-05-25 RX ADMIN — ROPINIROLE HYDROCHLORIDE 0.25 MG: 0.25 TABLET, FILM COATED ORAL at 21:17

## 2021-05-25 RX ADMIN — MIDAZOLAM HYDROCHLORIDE 1 MG: 5 INJECTION INTRAMUSCULAR; INTRAVENOUS at 11:11

## 2021-05-25 RX ADMIN — HYDROCODONE BITARTRATE AND ACETAMINOPHEN 1 TABLET: 5; 325 TABLET ORAL at 07:57

## 2021-05-25 RX ADMIN — FENTANYL CITRATE 50 MCG: 50 INJECTION INTRAMUSCULAR; INTRAVENOUS at 11:11

## 2021-05-25 RX ADMIN — CALCIUM CARBONATE (ANTACID) CHEW TAB 500 MG 500 MG: 500 CHEW TAB at 21:18

## 2021-05-25 RX ADMIN — METOPROLOL TARTRATE 50 MG: 50 TABLET, FILM COATED ORAL at 21:17

## 2021-05-25 RX ADMIN — METHOCARBAMOL TABLETS 500 MG: 500 TABLET, COATED ORAL at 21:17

## 2021-05-25 RX ADMIN — GABAPENTIN 100 MG: 100 CAPSULE ORAL at 16:03

## 2021-05-25 RX ADMIN — METHOCARBAMOL TABLETS 500 MG: 500 TABLET, COATED ORAL at 16:04

## 2021-05-25 RX ADMIN — NEOMYCIN SULFATE, POLYMYXIN B SULFATE, HYDROCORTISONE 1 DROP: 3.5; 10000; 1 SOLUTION/ DROPS AURICULAR (OTIC) at 16:04

## 2021-05-25 RX ADMIN — Medication 10 ML: at 21:18

## 2021-05-25 RX ADMIN — PRAVASTATIN SODIUM 40 MG: 40 TABLET ORAL at 07:59

## 2021-05-25 RX ADMIN — METHOCARBAMOL TABLETS 500 MG: 500 TABLET, COATED ORAL at 07:59

## 2021-05-25 SDOH — ECONOMIC STABILITY: HOUSING INSECURITY
IN THE LAST 12 MONTHS, WAS THERE A TIME WHEN YOU DID NOT HAVE A STEADY PLACE TO SLEEP OR SLEPT IN A SHELTER (INCLUDING NOW)?: NO

## 2021-05-25 SDOH — HEALTH STABILITY: PHYSICAL HEALTH: ON AVERAGE, HOW MANY DAYS PER WEEK DO YOU ENGAGE IN MODERATE TO STRENUOUS EXERCISE (LIKE A BRISK WALK)?: 3 DAYS

## 2021-05-25 SDOH — HEALTH STABILITY: MENTAL HEALTH
STRESS IS WHEN SOMEONE FEELS TENSE, NERVOUS, ANXIOUS, OR CAN'T SLEEP AT NIGHT BECAUSE THEIR MIND IS TROUBLED. HOW STRESSED ARE YOU?: NOT AT ALL

## 2021-05-25 SDOH — SOCIAL STABILITY: SOCIAL NETWORK: HOW OFTEN DO YOU ATTEND CHURCH OR RELIGIOUS SERVICES?: MORE THAN 4 TIMES PER YEAR

## 2021-05-25 SDOH — SOCIAL STABILITY: SOCIAL NETWORK
IN A TYPICAL WEEK, HOW MANY TIMES DO YOU TALK ON THE PHONE WITH FAMILY, FRIENDS, OR NEIGHBORS?: MORE THAN THREE TIMES A WEEK

## 2021-05-25 SDOH — SOCIAL STABILITY: SOCIAL NETWORK: HOW OFTEN DO YOU GET TOGETHER WITH FRIENDS OR RELATIVES?: MORE THAN THREE TIMES A WEEK

## 2021-05-25 SDOH — HEALTH STABILITY: MENTAL HEALTH: HOW OFTEN DO YOU HAVE A DRINK CONTAINING ALCOHOL?: NEVER

## 2021-05-25 SDOH — ECONOMIC STABILITY: TRANSPORTATION INSECURITY
IN THE PAST 12 MONTHS, HAS LACK OF TRANSPORTATION KEPT YOU FROM MEETINGS, WORK, OR FROM GETTING THINGS NEEDED FOR DAILY LIVING?: NO

## 2021-05-25 SDOH — ECONOMIC STABILITY: TRANSPORTATION INSECURITY
IN THE PAST 12 MONTHS, HAS THE LACK OF TRANSPORTATION KEPT YOU FROM MEDICAL APPOINTMENTS OR FROM GETTING MEDICATIONS?: NO

## 2021-05-25 SDOH — SOCIAL STABILITY: SOCIAL NETWORK: HOW OFTEN DO YOU ATTENT MEETINGS OF THE CLUB OR ORGANIZATION YOU BELONG TO?: MORE THAN 4 TIMES PER YEAR

## 2021-05-25 SDOH — HEALTH STABILITY: PHYSICAL HEALTH: ON AVERAGE, HOW MANY MINUTES DO YOU ENGAGE IN EXERCISE AT THIS LEVEL?: 20 MIN

## 2021-05-25 SDOH — ECONOMIC STABILITY: FOOD INSECURITY: WITHIN THE PAST 12 MONTHS, THE FOOD YOU BOUGHT JUST DIDN'T LAST AND YOU DIDN'T HAVE MONEY TO GET MORE.: NEVER TRUE

## 2021-05-25 SDOH — SOCIAL STABILITY: SOCIAL NETWORK
DO YOU BELONG TO ANY CLUBS OR ORGANIZATIONS SUCH AS CHURCH GROUPS UNIONS, FRATERNAL OR ATHLETIC GROUPS, OR SCHOOL GROUPS?: YES

## 2021-05-25 SDOH — SOCIAL STABILITY: SOCIAL NETWORK: ARE YOU MARRIED, WIDOWED, DIVORCED, SEPARATED, NEVER MARRIED, OR LIVING WITH A PARTNER?: WIDOWED

## 2021-05-25 SDOH — SOCIAL STABILITY: SOCIAL INSECURITY
WITHIN THE LAST YEAR, HAVE TO BEEN RAPED OR FORCED TO HAVE ANY KIND OF SEXUAL ACTIVITY BY YOUR PARTNER OR EX-PARTNER?: NO

## 2021-05-25 SDOH — SOCIAL STABILITY: SOCIAL INSECURITY: WITHIN THE LAST YEAR, HAVE YOU BEEN AFRAID OF YOUR PARTNER OR EX-PARTNER?: NO

## 2021-05-25 SDOH — SOCIAL STABILITY: SOCIAL INSECURITY
WITHIN THE LAST YEAR, HAVE YOU BEEN KICKED, HIT, SLAPPED, OR OTHERWISE PHYSICALLY HURT BY YOUR PARTNER OR EX-PARTNER?: NO

## 2021-05-25 SDOH — SOCIAL STABILITY: SOCIAL INSECURITY: WITHIN THE LAST YEAR, HAVE YOU BEEN HUMILIATED OR EMOTIONALLY ABUSED IN OTHER WAYS BY YOUR PARTNER OR EX-PARTNER?: NO

## 2021-05-25 SDOH — ECONOMIC STABILITY: FOOD INSECURITY: WITHIN THE PAST 12 MONTHS, YOU WORRIED THAT YOUR FOOD WOULD RUN OUT BEFORE YOU GOT MONEY TO BUY MORE.: NEVER TRUE

## 2021-05-25 SDOH — ECONOMIC STABILITY: INCOME INSECURITY: HOW HARD IS IT FOR YOU TO PAY FOR THE VERY BASICS LIKE FOOD, HOUSING, MEDICAL CARE, AND HEATING?: NOT HARD AT ALL

## 2021-05-25 SDOH — HEALTH STABILITY: MENTAL HEALTH: HOW MANY STANDARD DRINKS CONTAINING ALCOHOL DO YOU HAVE ON A TYPICAL DAY?: 1 OR 2

## 2021-05-25 SDOH — ECONOMIC STABILITY: INCOME INSECURITY: IN THE LAST 12 MONTHS, WAS THERE A TIME WHEN YOU WERE NOT ABLE TO PAY THE MORTGAGE OR RENT ON TIME?: NO

## 2021-05-25 ASSESSMENT — PAIN SCALES - GENERAL
PAINLEVEL_OUTOF10: 0
PAINLEVEL_OUTOF10: 6
PAINLEVEL_OUTOF10: 0
PAINLEVEL_OUTOF10: 0

## 2021-05-25 ASSESSMENT — PAIN SCALES - WONG BAKER
WONGBAKER_NUMERICALRESPONSE: 0
WONGBAKER_NUMERICALRESPONSE: 0

## 2021-05-25 ASSESSMENT — PAIN DESCRIPTION - PROGRESSION: CLINICAL_PROGRESSION: NOT CHANGED

## 2021-05-25 NOTE — PRE SEDATION
nightly As needed for restless leg syndrome 21   Historical Provider, MD   terazosin (HYTRIN) 2 MG capsule Take 2 mg by mouth daily  5/10/21   Historical Provider, MD   Multiple Vitamins-Minerals (PRESERVISION AREDS 2 PO) Take 1 tablet by mouth 2 times daily     Historical Provider, MD   alendronate (FOSAMAX) 70 MG tablet Take 70 mg by mouth every 7 days    Historical Provider, MD   pravastatin (PRAVACHOL) 40 MG tablet Take 40 mg by mouth daily  2/6/15   Historical Provider, MD   metoprolol tartrate (LOPRESSOR) 25 MG tablet Take 50 mg by mouth 2 times daily  2/6/15   Historical Provider, MD   Cholecalciferol (VITAMIN D PO) Take by mouth daily     Historical Provider, MD   Calcium Carbonate (CALTRATE 600 PO) Take 1 tablet by mouth daily     Historical Provider, MD     Coumadin Use Last 7 Days:  {yes/no:87585}  Antiplatelet drug therapy use last 7 days: {yes/no:31415}  Other anticoagulant use last 7 days: {yes/no:85959}  Additional Medication Information:  ***      Pre-Sedation Documentation and Exam:   {PRE SEDATION DOCUMENTATION AND EXAM:}    Mallampati Airway Assessment:  {AIRWAY ASSESSMENT:}    Prior History of Anesthesia Complications:   {PRIOR ATVDXVIMWSUOZ:35022}    ASA Classification:  {ASA CLASSIFICATION:40802}    Sedation/ Anesthesia Plan:   {SEDATION/ANESTHESIA [facility]:32472}    Medications Planned:   {CONSCIOUS SEDATION PRE PROCEDURE EEWO:86293}    Patient is an appropriate candidate for plan of sedation: {yes no free [facility]}    Electronically signed by Prasad Jones MD on 2021 at 11:00 AM

## 2021-05-25 NOTE — PROGRESS NOTES
Physical Therapy    Patient just had Kyphoplasty procedure for T8 compression fracture done today. Patient was still drowsy post procedure and had 45 in left in strict bedrest order. Patient will be followed up later when appropriate to participate in PT evaluation. No charge.     Héctor Zacarias MS PT, # [phone]

## 2021-05-25 NOTE — PROGRESS NOTES
Patient admitted to room 304 from ER. Patient oriented to room, call light, bed rails, phone, lights and bathroom. Patient instructed about the schedule of the day including: vital sign frequency, lab draws, possible tests, frequency of MD and staff rounds, daily weights, I &O's and prescribed diet. bed alarm in place, patient aware of placement and reason. Telemetry box in place, patient aware of placement and reason. Bed locked, in lowest position, side rails up 2/4, call light within reach. Recliner Assessment  Patient is able to demonstrate the ability to move from a reclining position to an upright position within the recliner. 4 Eyes Skin Assessment     The patient is being assess for   Admission    I agree that 2 RN's have performed a thorough Head to Toe Skin Assessment on the patient. ALL assessment sites listed below have been assessed. Areas assessed for pressure by both nurses:   [x]   Head, Face, and Ears   [x]   Shoulders, Back, and Chest, Abdomen  [x]   Arms, Elbows, and Hands   [x]   Coccyx, Sacrum, and Ischium  [x]   Legs, Feet, and Heels    Patient noted to have redness to waqas area, scattered bruising prominently noted of bilateral feet, arms, and two major bruises on her back. Patient has a dressing that is covering the incision site. No drainage noted. Patient has redness to L elbow. Skin Assessed Under all Medical Devices by both nurses:  no medical devices              All Mepilex Borders were peeled back and area peeked at by both nurses:  Yes  Please list where Mepilex Borders are located:  Heels. **SHARE this note so that the co-signing nurse is able to place an eSignature**    Co-signer eSignature: Electronically signed by Idris Willis RN on 5/25/21 at 4:32 PM EDT    Does the Patient have Skin Breakdown related to pressure?   No            Herbie Prevention initiated:  No   Wound Care Orders initiated:  No      [facility] nurse consulted for Pressure Injury (Stage 3,4, Unstageable, DTI, NWPT, Complex wounds)and New or Established Ostomies:  No      Primary Nurse eSignature: Electronically signed by Marie Renner RN on 5/25/21 at 4:28 PM EDT

## 2021-05-25 NOTE — PROGRESS NOTES
Physician Progress Note      PATIENT:               Mc Saxena  CSN #:                  [id number]  :                       1926  ADMIT DATE:       2021 1:32 PM  [address] [place] DATE:        2021 9:02 AM  RESPONDING  PROVIDER #:        Samira Leiva MD          QUERY TEXT:    Pt admitted with Non-traumatic acute T8 compression Fracture. Pt noted to have   \"Mild hypoxemia- Likely multifactorial due to atelectasis and pulmonary   edema/pleural effusions from IV fluids. \" in attending PN . If possible, please document in the progress notes and discharge summary if   you are evaluating and/or treating any of the following: The medical record reflects the following:  Risk Factors: Pulmonary HTN, CKD  Clinical Indicators: Per PN -\"Mild hypoxemia- Likely multifactorial due to   atelectasis and pulmonary edema/pleural effusions from IV fluids\". -ProBNP   2362.  CXR \"Increased interstitial markings reflecting pulmonary vascular   congestion/interstitial edema versus an atypical pneumonia. Small bilateral   pleural effusions\". Echo from  EF 55-60% with elevated Left ventricular diastolic filling   pressure. U/O- 2500 ml on     Treatment: Labs, CXR, Lasix 20mg IV x1 on ,O2 with monitoring of O2 sat,   ongoing supportive care and monitoring. Thank you,  Jose Call RN, BSN, [employer]   [phone]  Options provided:  -- Noncardiogenic acute pulmonary edema due to IV fluids  -- Acute pulmonary edema due to heart failure  -- Other - I will add my own diagnosis  -- Disagree - Not applicable / Not valid  -- Disagree - Clinically unable to determine / Unknown  -- Refer to Clinical Documentation Reviewer    PROVIDER RESPONSE TEXT:    This patient has noncardiogenic acute pulmonary edema due to IV fluids.     Query created by: Mirtha De Jesus on 2021 2:02 PM      Electronically signed by:  Samira Leiva MD 2021 2:53 PM

## 2021-05-25 NOTE — PRE SEDATION
Sedation Pre-Procedure Note    Patient Name: Marianne Cosby   YOB: 1926  Room/Bed: 0216/0216-01  Medical Record Number: [id number]  Date: 5/25/2021   Time: 11:04 AM       Indication:  T8 kyphoplasty    Consent: I have discussed with the patient and/or the patient representative the indication, alternatives, and the possible risks and/or complications of the planned procedure and the anesthesia methods. The patient and/or patient representative appear to understand and agree to proceed. Vital Signs:   Vitals:    05/25/21 0529   BP: 136/70   Pulse: 56   Resp: 18   Temp: 97.8 °F (36.6 °C)   SpO2: 96%       Past Medical History:   has a past medical history of Cancer ([address] Cataract, High blood pressure, and Thyroid disorder. Past Surgical History:   has a past surgical history that includes Hysterectomy; Cholecystectomy; Cataract removal; bladder suspension (2000); and Breast lumpectomy (Left, 08/21/15). Medications:   Scheduled Meds:   Continuous Infusions:   PRN Meds:   Home Meds:   Prior to Admission medications    Medication Sig Start Date End Date Taking? Authorizing Provider   HYDROcodone-acetaminophen (NORCO) 5-325 MG per tablet Take 1 tablet by mouth every 6 hours as needed for Pain for up to 3 days. 5/25/21 5/28/21 Yes Dipti Anglin MD   ipratropium-albuterol (DUONEB) 0.5-2.5 (3) MG/3ML SOLN nebulizer solution Inhale 3 mLs into the lungs every 4 hours as needed for Shortness of Breath or Other (wheezing) 5/25/21  Yes Dipti Anglin MD   gabapentin (NEURONTIN) 100 MG capsule Take 1 capsule by mouth 3 times daily for 3 days.  5/25/21 5/28/21 Yes Dipti Anglin MD   methocarbamol (ROBAXIN) 500 MG tablet Take 1 tablet by mouth 4 times daily for 10 days 5/25/21 6/4/21 Yes Dipti Anglin MD   Cyanocobalamin (VITAMIN B-12) 2500 MCG SUBL Take 2,500 mcg by mouth daily    Historical Provider, MD   levothyroxine (SYNTHROID) 112 MCG tablet Take 112 mcg by mouth Daily    Historical Provider, MD [facility]) 300 MG extended release capsule Take 300 mg by mouth daily 9/28/20   Historical Provider, MD   furosemide (LASIX) 20 MG tablet Take 20 mg by mouth daily 10/21/20   Historical Provider, MD   rOPINIRole (REQUIP) 0.25 MG tablet Take 0.25 mg by mouth nightly As needed for restless leg syndrome 4/16/21   Historical Provider, MD   terazosin (HYTRIN) 2 MG capsule Take 2 mg by mouth daily  5/10/21   Historical Provider, MD   Multiple Vitamins-Minerals (PRESERVISION AREDS 2 PO) Take 1 tablet by mouth 2 times daily     Historical Provider, MD   alendronate (FOSAMAX) 70 MG tablet Take 70 mg by mouth every 7 days    Historical Provider, MD   pravastatin (PRAVACHOL) 40 MG tablet Take 40 mg by mouth daily  2/6/15   Historical Provider, MD   metoprolol tartrate (LOPRESSOR) 25 MG tablet Take 50 mg by mouth 2 times daily  2/6/15   Historical Provider, MD   Cholecalciferol (VITAMIN D PO) Take by mouth daily     Historical Provider, MD   Calcium Carbonate (CALTRATE 600 PO) Take 1 tablet by mouth daily     Historical Provider, MD     Coumadin Use Last 7 Days:  no  Antiplatelet drug therapy use last 7 days: no  Other anticoagulant use last 7 days: no  Additional Medication Information:        Pre-Sedation Documentation and Exam:   I have reviewed the patient's history and review of systems.     Mallampati Airway Assessment:  normal neck range of motion    Prior History of Anesthesia Complications:   none    ASA Classification:  Class 2 - A normal healthy patient with mild systemic disease    Sedation/ Anesthesia Plan:   intravenous sedation    Medications Planned:   midazolam (Versed) intravenously and fentanyl intravenously    Patient is an appropriate candidate for plan of sedation: yes    Electronically signed by Chloe Abrams MD on 5/25/2021 at 11:04 AM

## 2021-05-25 NOTE — DISCHARGE SUMMARY
Hospital Medicine Discharge Summary    Patient ID: Eloy Martinez      Patient's PCP: Alize Dan MD    Admit Date: 5/16/2021     Discharge Date: 5/25/2021      Admitting Physician: Betty Green DO     Discharge Physician: Bk Hayes MD     Discharge Diagnoses: Active Hospital Problems    Diagnosis     Syncope and collapse [R55]     Compression fracture of body of thoracic vertebra (HCC) [S22.000A]     Generalized weakness [R53.1]     HTN (hypertension), benign [I10]     Compression fracture of thoracic spine, non-traumatic, sequela [M48.54XS]        The patient was seen and examined on day of discharge and this discharge summary is in conjunction with any daily progress note from day of discharge. Hospital Course:   Non-traumatic acute T8 compression fracture   - Spine surgery consulted who recommended bracing vs kyphoplasty. Pt wants to proceed with kyphoplasty. IR consulted. Tentative plan for kypho on 5/24.  - Continue gabapentin, robaxin, lidocaine patch and norco as needed.  -Could not get the hyperflexing done because of no OR available  -Patient is transferred to [facility] to get the vertebroplasty done today     Near syncope   - Consistent with vasovagal vs ?seizure. - CT head nonacute. Orthostatic vitals were negative. - Neurology consulted. Recommended MRI which showed no acute infarct, mass or hemorrhage. EEG unlikely to .  - ECHO showed normal LVEF of 55-60%, no RWMAs, LVDFP is elevated, mod mitral stenosis, mild MR and TR, severe aortic stenosis, mod AR, elevated SPAP at 63 mmHG consistent with severe PHTN. - Pt follows with Cardiology as an oupt, only wants medical treatment for her known AS as noted in her EMR.    - Continue to monitor on telemetry.      Mild hypoxemia   - Likely multifactorial due to atelectasis and pulmonary edema/pleural effusions from IV fluids. No PE noted on chest CT on admission.  - Fluids dc'd.  S/p x1 dose of 20 mg lasix on 5/21.   - Encourage pulmonary toilet with IS, cough, deep breathing, OOB/ambulation.   -Off oxygen     Previously diagnosed bacterial pneumonia  - CT chest did not show pneumonia. - Discontinued antibiotic on 5/18/21.  - Pt remains afebrile with normal WBC.      Chronic kidney disease stage 3  - Stable. Baseline creatinine 1.3-1.6.   - Monitor closely. - Avoid nephrotoxics as able.      Anemia  - Consistent with chronic disease.  - No overt signs of bleeding.   - Hgb stable. Monitor closely.      Hyponatremia (improved)  - Possibly hypovolemic.  - S/p IV fluids.   - Na remains stable.      Hypertension  - Variable control.   - Continue her home medications. - Monitor closely.      Stage III left breast cancer, recurrent  - On palbociclib 75 mg and monthly fulvestrant and trastuzumab    - Follows with oncologist at [facility]. - Palliative care has seen pt.     Pulmonary nodules and mediastinal lymphadenopathy  - Pt to follow up with Oncology. Informed family.     Chronic cognitive impairment and underlying dementia  - Continue supportive care.                Physical Exam Performed:     /70   Pulse 56   Temp 97.8 °F (36.6 °C) (Oral)   Resp 18   Ht 5' 2\" (1.575 m)   Wt 153 lb 9.6 oz (69.7 kg)   SpO2 96%   BMI 28.09 kg/m²       General appearance: No apparent distress, appears stated age and cooperative. HEENT: Pupils equal, round, and reactive to light. Conjunctivae/corneas clear. Neck: Supple, with full range of motion. No jugular venous distention. Trachea midline. Respiratory:  Normal respiratory effort. Clear to auscultation, bilaterally without Rales/Wheezes/Rhonchi. Cardiovascular: Regular rate and rhythm with normal S1/S2 without murmurs, rubs or gallops. Abdomen: Soft, non-tender, non-distended with normal bowel sounds. Musculoskeletal: No clubbing, cyanosis or edema bilaterally. Back pain   skin: Skin color, texture, turgor normal.  No rashes or lesions.   Neurologic: pleural effusions. CT CHEST PULMONARY EMBOLISM W CONTRAST   Final Result   1. No pulmonary embolism. 2. Cardiomegaly with a small right pleural effusion and mild interstitial   edema in the lung bases. 3. Small scattered bilateral pulmonary nodules without significant change   from 03/05/2021 measuring up to approximately 6 mm. 4. Unchanged mediastinal and right hilar lymphadenopathy. Stable to mildly   improved left axillary lymphadenopathy. 5. Moderate T8 vertebral body compression fracture new from 03/05/2021 with 5   mm retropulsion. If clinically indicated further evaluation could be   obtained with MRI. RECOMMENDATIONS:   Fleischner Society guidelines for follow-up and management of incidentally   detected pulmonary nodules:      Multiple Solid Nodules:      Nodule size less than 6 mm   In a high-risk patient, optional CT at 12 months. - Low risk patients include individuals with minimal or absent history of   smoking and other known risk factors. - High risk patients include individuals with a history or smoking or known   risk factors. Radiology 2017 http://pubs. rsna.org/doi/full/10.1148/radiol. 8133515358         CT HEAD WO CONTRAST   Final Result   No acute intracranial abnormality. Mild paranasal and left mastoid air cell disease. XR CHEST PORTABLE   Final Result   No acute process. Improved aeration right lung base.          IR KYPHOPLASTY THORACIC 1 VERTEBRAL BODY    (Results Pending)   IR KYPHOPLASTY THORACIC 1 VERTEBRAL BODY    (Results Pending)          Consults:     IP CONSULT TO HOSPITALIST  IP CONSULT TO NEUROLOGY  IP CONSULT TO PALLIATIVE CARE  IP CONSULT TO SPINE  IP CONSULT TO INTERVENTIONAL RADIOLOGY    Disposition: [facility]    Condition at Discharge: Stable    Discharge Instructions/Follow-up: Hospitalist and interventional radiologist    Code Status:  DNR-CC     Activity: activity as tolerated    Diet: cardiac diet      Discharge Medications:     Discharge Medication List as of 5/25/2021  8:16 AM           Details   HYDROcodone-acetaminophen (NORCO) 5-325 MG per tablet Take 1 tablet by mouth every 6 hours as needed for Pain for up to 3 days. , Disp-3 tablet, R-0Print      ipratropium-albuterol (DUONEB) 0.5-2.5 (3) MG/3ML SOLN nebulizer solution Inhale 3 mLs into the lungs every 4 hours as needed for Shortness of Breath or Other (wheezing), Disp-360 mLDC to SNF      gabapentin (NEURONTIN) 100 MG capsule Take 1 capsule by mouth 3 times daily for 3 days. , Disp-3 capsule, R-0DC to SNF      methocarbamol (ROBAXIN) 500 MG tablet Take 1 tablet by mouth 4 times daily for 10 days, Disp-40 tablet, R-0DC to SNF              Details   Cyanocobalamin (VITAMIN B-12) 2500 MCG SUBL Take 2,500 mcg by mouth dailyHistorical Med      levothyroxine (SYNTHROID) 112 MCG tablet Take 112 mcg by mouth DailyHistorical Med      dilTIAZem (TIAZAC) 300 MG extended release capsule Take 300 mg by mouth dailyHistorical Med      furosemide (LASIX) 20 MG tablet Take 20 mg by mouth dailyHistorical Med      rOPINIRole (REQUIP) 0.25 MG tablet Take 0.25 mg by mouth nightly As needed for restless leg syndromeHistorical Med      terazosin (HYTRIN) 2 MG capsule Take 2 mg by mouth daily Historical Med      Multiple Vitamins-Minerals (PRESERVISION AREDS 2 PO) Take 1 tablet by mouth 2 times daily Historical Med      alendronate (FOSAMAX) 70 MG tablet Take 70 mg by mouth every 7 days      pravastatin (PRAVACHOL) 40 MG tablet Take 40 mg by mouth daily Historical Med      metoprolol tartrate (LOPRESSOR) 25 MG tablet Take 50 mg by mouth 2 times daily Historical Med      Cholecalciferol (VITAMIN D PO) Take by mouth daily Historical Med      Calcium Carbonate (CALTRATE 600 PO) Take 1 tablet by mouth daily Historical Med             Time Spent on discharge is   45 minutes in the examination, evaluation, counseling and review of medications and discharge

## 2021-05-25 NOTE — FLOWSHEET NOTE
05/25/21 1201   Vital Signs   Temp 97.1 °F (36.2 °C)   Temp Source Oral   Pulse 69   Heart Rate Source Monitor   Resp 20   BP (!) 141/63   BP Location Left upper arm   Patient Position Supine   Level of Consciousness Alert (0)   MEWS Score 1   Patient Currently in Pain Denies   Oxygen Therapy   SpO2 98 %   Pulse Oximeter Device Mode Intermittent   Pulse Oximeter Device Location Finger   O2 Device Nasal cannula   O2 Flow Rate (L/min) 2 L/min   Patient brought up by LEONCIO Carver. VSS. Telemetry applied. Orders released.

## 2021-05-25 NOTE — PROGRESS NOTES
Patient arrived by stretcher from [facility] [place] for a T8 Kyphoplasty to be completed today. [facility] Patient's [facility] present. Both patient and patient's son updated on plan of care.   Doni Estrada RN

## 2021-05-25 NOTE — PROGRESS NOTES
Kyphoplasty complete, patient tolerated well. Dr. Pancho West bedside to update both patient and patient's son on outcome and plan of care. Patient transported with RN present to [facility], bedside report given to Genna Cheng RN. Care transferred. Orders placed from Radiologist prior to transfer.   Eric Renteria RN

## 2021-05-25 NOTE — PLAN OF CARE
Problem: Falls - Risk of:  Goal: Will remain free from falls  Description: Will remain free from falls  Outcome: Ongoing  Note: Pt will remain free from falls throughout hospital stay. Fall precautions in place, bed alarm on, bed in lowest position with wheels locked and side rails 2/4 up. Room door open and hourly rounding completed. Will continue to monitor throughout shift. Problem: Skin Integrity:  Goal: Will show no infection signs and symptoms  Description: Will show no infection signs and symptoms  Outcome: Ongoing  Note: Pt is at risk for skin breakdown. Pt will have skin assessments every shift, Q2 turns, heels elevated off of the bed, and friction and shear prevented when possible. Will continue to monitor for signs of skin breakdown and enforce prevention measures. Problem: Pain:  Goal: Pain level will decrease  Description: Pain level will decrease  Outcome: Ongoing  Note: Pt will be satisfied with pain control. Pt uses numeric pain rating scale with reassessments after pain med administration. Will continue to monitor progression throughout shift.

## 2021-05-25 NOTE — DISCHARGE INSTR - COC
Continuity of Care Form    Patient Name: Neymar Ohara   :  1926  MRN:  [id number]    Admit date:  2021  Discharge date:  21    Code Status Order: [facility]   Advance Directives:   [facility] Documentation     Date/Time Healthcare Directive Type of Healthcare Directive Copy in [address] Agent's Name Healthcare Agent's Phone Number    [phone]  Yes, patient has an advance directive for healthcare treatment  Durable power of  for health care  No, copy requested from family  Adult Children  --  --          Admitting Physician:  Fareed Mehta DO  PCP: Sanna Villareal MD    Discharging Nurse: [address] Unit/Room#: 0216/0216-01  Discharging Unit Phone Number: [phone]    Emergency Contact:   Extended Emergency Contact Information  Primary Emergency Contact: [address] Phone: [phone]  Work Phone: [phone]  Mobile Phone: [phone]  Relation: Child  Secondary Emergency Contact: [facility] Phone: [phone]  Relation: Child    Past Surgical History:  Past Surgical History:   Procedure Laterality Date    BLADDER SUSPENSION      BREAST LUMPECTOMY Left 08/21/15    CATARACT REMOVAL      CHOLECYSTECTOMY      HYSTERECTOMY         Immunization History: There is no immunization history on file for this patient.     Active Problems:  Patient Active Problem List   Diagnosis Code    Breast cancer of upper-inner quadrant of left female breast ([facility] [address] C50.212    Greater trochanteric bursitis M70.60    Pneumonia J18.9    Compression fracture of thoracic spine, non-traumatic, sequela M48.54XS    Syncope and collapse R55    Compression fracture of body of thoracic vertebra (HCC) S22.000A    Generalized weakness R53.1    HTN (hypertension), benign I10       Isolation/Infection:   Isolation          No Isolation        Patient Infection Status     Infection Onset Added Last Indicated Last Indicated By Review Planned Expiration Resolved Resolved By    None active    Resolved    COVID-19 Rule Out 05/13/21 05/13/21 05/13/21 SARS-CoV-2 NAAT (Rapid) (Ordered)   05/13/21 Rule-Out Test Resulted          Nurse Assessment:  Last Vital Signs: /70   Pulse 56   Temp 97.8 °F (36.6 °C) (Oral)   Resp 18   Ht 5' 2\" (1.575 m)   Wt 153 lb 9.6 oz (69.7 kg)   SpO2 96%   BMI 28.09 kg/m²     Last documented pain score (0-10 scale): Pain Level: 7  Last Weight:   Wt Readings from Last 1 Encounters:   05/25/21 153 lb 9.6 oz (69.7 kg)     Mental Status:  oriented    IV Access:  - Peripheral IV - site  R Antecubital, insertion date: 05/24/21    Nursing Mobility/ADLs:  Walking   Dependent  Transfer  Dependent  Bathing  Dependent  Dressing  Dependent  Toileting  Dependent  Feeding  Assisted  Med Admin  Assisted  Med Delivery   whole    Wound Care Documentation and Therapy:  Incision 08/21/15 Chest Left (Active)   Number of days: 2103        Elimination:  Continence:   · Bowel: Yes  · Bladder: Yes  Urinary Catheter: None   Colostomy/Ileostomy/Ileal Conduit: No       Date of Last BM: unknown     Intake/Output Summary (Last 24 hours) at 5/25/2021 7813  Last data filed at 5/24/2021 2054  Gross per 24 hour   Intake 480 ml   Output 1150 ml   Net -670 ml     I/O last 3 completed shifts: In: 480 [P.O.:480]  Out: 1500 [Urine:1500]    Safety Concerns: At Risk for Falls    Impairments/Disabilities:      None    Nutrition Therapy:  Current Nutrition Therapy:   - NPO    Routes of Feeding: Oral  Liquids: {Slp liquid thickness:86149}  Daily Fluid Restriction: no  Last Modified Barium Swallow with Video (Video Swallowing Test): not done    Treatments at the Time of Hospital Discharge:   Respiratory Treatments: ***  Oxygen Therapy:  is on oxygen at 1 L/min per nasal cannula.   Ventilator:    - No ventilator support    Rehab Therapies: {THERAPEUTIC INTERVENTION:8573341562}  Weight Bearing Status/Restrictions: Non-weight bearing on {Right/Left:16} leg  Other Medical Equipment (for information only, NOT a DME order):  hospital bed  Other Treatments: ***    Patient's personal belongings (please select all that are sent with patient):  clothes     RN SIGNATURE:  Electronically signed by Aleta Kwok RN on 5/25/21 at 8:15 AM EDT    CASE MANAGEMENT/SOCIAL WORK SECTION    Inpatient Status Date: ***    Readmission Risk Assessment Score:  Readmission Risk              Risk of Unplanned Readmission:  19           Discharging to Facility/ Agency   · Name:   · Address:  · Phone:  · Fax:    Dialysis Facility (if applicable)   · Name:  · Address:  · Dialysis Schedule:  · Phone:  · Fax:    / signature: {Esignature:[id number]}    PHYSICIAN SECTION    Prognosis: {Prognosis:1824846647}    Condition at Discharge: Smooth Li Patient Condition:753048278}    Rehab Potential (if transferring to Rehab): {Prognosis:0594332015}    Recommended Labs or Other Treatments After Discharge: ***    Physician Certification: I certify the above information and transfer of Governor Chimera  is necessary for the continuing treatment of the diagnosis listed and that she requires {Admit to Appropriate Level of Care:83199} for {GREATER/LESS:893028727} 30 days.      Update Admission H&P: {CHP DME Changes in UQDBI:856125377}    PHYSICIAN SIGNATURE:  {Esignature:[id number]}

## 2021-05-25 NOTE — PROGRESS NOTES
Bedside report given to Sangeeta Benoit RN. Care transferred. Norco prescription is in chart from [facility] [place]. Patient is resting with bed alarm on.

## 2021-05-25 NOTE — PROGRESS NOTES
Patient to IR suite for Kyphoplasty, prone on table. Time out complete, pre procedure medications given IV, see MAR. Patient's VSS on RA, patient denies pain at rest currently, 8/10 pain with movement in thoracic area that radiates to her front upper chest and diaphragm.   Malgorzata Barr RN

## 2021-05-26 LAB
ANION GAP SERPL CALCULATED.3IONS-SCNC: 6 MMOL/L (ref 3–16)
BUN BLDV-MCNC: 16 MG/DL (ref 7–20)
CALCIUM SERPL-MCNC: 8.4 MG/DL (ref 8.3–10.6)
CHLORIDE BLD-SCNC: 95 MMOL/L (ref 99–110)
CO2: 26 MMOL/L (ref 21–32)
CREAT SERPL-MCNC: 1 MG/DL (ref 0.6–1.2)
GFR AFRICAN AMERICAN: >60
GFR NON-AFRICAN AMERICAN: 51
GLUCOSE BLD-MCNC: 94 MG/DL (ref 70–99)
HCT VFR BLD CALC: 29.2 % (ref 36–48)
HEMOGLOBIN: 9.9 G/DL (ref 12–16)
MCH RBC QN AUTO: 32.4 PG (ref 26–34)
MCHC RBC AUTO-ENTMCNC: 33.8 G/DL (ref 31–36)
MCV RBC AUTO: 96 FL (ref 80–100)
PDW BLD-RTO: 20.9 % (ref 12.4–15.4)
PLATELET # BLD: 243 K/UL (ref 135–450)
PMV BLD AUTO: 6.9 FL (ref 5–10.5)
POTASSIUM REFLEX MAGNESIUM: 5 MMOL/L (ref 3.5–5.1)
RBC # BLD: 3.04 M/UL (ref 4–5.2)
SODIUM BLD-SCNC: 127 MMOL/L (ref 136–145)
WBC # BLD: 5.7 K/UL (ref 4–11)

## 2021-05-26 PROCEDURE — G0378 HOSPITAL OBSERVATION PER HR: HCPCS

## 2021-05-26 PROCEDURE — 97112 NEUROMUSCULAR REEDUCATION: CPT

## 2021-05-26 PROCEDURE — 99232 SBSQ HOSP IP/OBS MODERATE 35: CPT | Performed by: INTERNAL MEDICINE

## 2021-05-26 PROCEDURE — 97535 SELF CARE MNGMENT TRAINING: CPT

## 2021-05-26 PROCEDURE — 97161 PT EVAL LOW COMPLEX 20 MIN: CPT

## 2021-05-26 PROCEDURE — 96372 THER/PROPH/DIAG INJ SC/IM: CPT

## 2021-05-26 PROCEDURE — 85027 COMPLETE CBC AUTOMATED: CPT

## 2021-05-26 PROCEDURE — 97166 OT EVAL MOD COMPLEX 45 MIN: CPT

## 2021-05-26 PROCEDURE — 97116 GAIT TRAINING THERAPY: CPT

## 2021-05-26 PROCEDURE — 1200000000 HC SEMI PRIVATE

## 2021-05-26 PROCEDURE — 6370000000 HC RX 637 (ALT 250 FOR IP): Performed by: INTERNAL MEDICINE

## 2021-05-26 PROCEDURE — 80048 BASIC METABOLIC PNL TOTAL CA: CPT

## 2021-05-26 PROCEDURE — 97110 THERAPEUTIC EXERCISES: CPT

## 2021-05-26 PROCEDURE — 97530 THERAPEUTIC ACTIVITIES: CPT

## 2021-05-26 PROCEDURE — 2580000003 HC RX 258: Performed by: NURSE PRACTITIONER

## 2021-05-26 PROCEDURE — 6370000000 HC RX 637 (ALT 250 FOR IP): Performed by: NURSE PRACTITIONER

## 2021-05-26 PROCEDURE — 6360000002 HC RX W HCPCS: Performed by: INTERNAL MEDICINE

## 2021-05-26 PROCEDURE — 36415 COLL VENOUS BLD VENIPUNCTURE: CPT

## 2021-05-26 PROCEDURE — 6370000000 HC RX 637 (ALT 250 FOR IP): Performed by: PHYSICIAN ASSISTANT

## 2021-05-26 RX ORDER — METHOCARBAMOL 500 MG/1
500 TABLET, FILM COATED ORAL 3 TIMES DAILY
Status: DISCONTINUED | OUTPATIENT
Start: 2021-05-26 | End: 2021-05-28 | Stop reason: HOSPADM

## 2021-05-26 RX ADMIN — METOPROLOL TARTRATE 50 MG: 50 TABLET, FILM COATED ORAL at 19:56

## 2021-05-26 RX ADMIN — ENOXAPARIN SODIUM 30 MG: 100 INJECTION SUBCUTANEOUS at 10:59

## 2021-05-26 RX ADMIN — LEVOTHYROXINE SODIUM 112 MCG: 112 TABLET ORAL at 05:57

## 2021-05-26 RX ADMIN — Medication 1000 UNITS: at 08:41

## 2021-05-26 RX ADMIN — NEOMYCIN SULFATE, POLYMYXIN B SULFATE, HYDROCORTISONE 1 DROP: 3.5; 10000; 1 SOLUTION/ DROPS AURICULAR (OTIC) at 01:39

## 2021-05-26 RX ADMIN — Medication 10 ML: at 08:42

## 2021-05-26 RX ADMIN — DILTIAZEM HYDROCHLORIDE 300 MG: 300 CAPSULE, COATED, EXTENDED RELEASE ORAL at 08:41

## 2021-05-26 RX ADMIN — METHOCARBAMOL TABLETS 500 MG: 500 TABLET, COATED ORAL at 13:54

## 2021-05-26 RX ADMIN — DOXAZOSIN 2 MG: 2 TABLET ORAL at 08:41

## 2021-05-26 RX ADMIN — METOPROLOL TARTRATE 50 MG: 50 TABLET, FILM COATED ORAL at 08:41

## 2021-05-26 RX ADMIN — NEOMYCIN SULFATE, POLYMYXIN B SULFATE, HYDROCORTISONE 1 DROP: 3.5; 10000; 1 SOLUTION/ DROPS AURICULAR (OTIC) at 11:05

## 2021-05-26 RX ADMIN — GABAPENTIN 100 MG: 100 CAPSULE ORAL at 13:54

## 2021-05-26 RX ADMIN — METHOCARBAMOL TABLETS 500 MG: 500 TABLET, COATED ORAL at 19:55

## 2021-05-26 RX ADMIN — PRAVASTATIN SODIUM 40 MG: 40 TABLET ORAL at 08:41

## 2021-05-26 RX ADMIN — GABAPENTIN 100 MG: 100 CAPSULE ORAL at 19:56

## 2021-05-26 RX ADMIN — CALCIUM CARBONATE (ANTACID) CHEW TAB 500 MG 500 MG: 500 CHEW TAB at 08:42

## 2021-05-26 RX ADMIN — NEOMYCIN SULFATE, POLYMYXIN B SULFATE, HYDROCORTISONE 1 DROP: 3.5; 10000; 1 SOLUTION/ DROPS AURICULAR (OTIC) at 18:05

## 2021-05-26 RX ADMIN — METHOCARBAMOL TABLETS 500 MG: 500 TABLET, COATED ORAL at 08:41

## 2021-05-26 RX ADMIN — NEOMYCIN SULFATE, POLYMYXIN B SULFATE, HYDROCORTISONE 1 DROP: 3.5; 10000; 1 SOLUTION/ DROPS AURICULAR (OTIC) at 05:57

## 2021-05-26 RX ADMIN — ACETAMINOPHEN 650 MG: 325 TABLET ORAL at 16:47

## 2021-05-26 RX ADMIN — CALCIUM CARBONATE (ANTACID) CHEW TAB 500 MG 500 MG: 500 CHEW TAB at 19:55

## 2021-05-26 RX ADMIN — ROPINIROLE HYDROCHLORIDE 0.25 MG: 0.25 TABLET, FILM COATED ORAL at 19:56

## 2021-05-26 RX ADMIN — Medication 10 ML: at 19:56

## 2021-05-26 RX ADMIN — GABAPENTIN 100 MG: 100 CAPSULE ORAL at 08:41

## 2021-05-26 ASSESSMENT — PAIN SCALES - GENERAL: PAINLEVEL_OUTOF10: 3

## 2021-05-26 NOTE — PROGRESS NOTES
Assisted back to bed. Tolerated sitting up in chair for about 1 1/2hrs. Son at bedside. Call light within reach.

## 2021-05-26 NOTE — PROGRESS NOTES
Inpatient Occupational Therapy  Evaluation and Treatment    Unit: PCU  Date:  5/26/2021  Patient Name:    Kourtney Orr  Admitting diagnosis:  Compression fracture of body of thoracic vertebra ([facility] [address] [S22.000A]  Admit Date:  5/25/2021  Precautions/Restrictions/WB Status/ Lines/ Wounds/ Oxygen: Fall risk, Bed/chair alarm and kyphoplasty T8 5/25/21    Treatment Time:  6652-7277  Treatment Number: 1   Timed code treatment minutes 45 minutes   Total Treatment minutes:   55   minutes    Patient Goals for Therapy:  \" go to a rehab \"      Discharge Recommendations: SNF  DME needs for discharge: defer to facility       Therapy recommendations for staff:   Assist of 1 with use of rolling walker (RW) and gait belt for all transfers and ambulation to/from chair  to/from bathroom   *watch BP*    History of Present Illness: [facility] TESFAYE H&P 5/25/21:  \"The patient is a 80 y.o. female with recurrent breast cancer on chemotherapy, hypertension, CKD 3, hypothyroidism, severe aortic stenosis who was transferred from [facility] for T8 kyphoplasty. Patient developed acute onset mid back pain after a shaking episode on the commode around 5/16/21. She was taken to [facility] where imaging revealed an acute T8 compression fx. She was admitted to [facility] and evaluated by neuro for the shaking episode- no evidence of seizure, MRI unremarkable- no further work up planned. She was scheduled to undergo kyphoplasty at [facility] but due to schedule constraints she was transferred to [facility] to have procedure completed. She was seen after procedure today, she denies complaints. SHe currently lives at home alone, will need PT OT and CM consult for possible SNF placement. \"     Acute T8 compression fracture:  MRI thoracic spine 5/20/21  Impression   1. Acute/subacute T8 compression fracture demonstrating 70% loss of height.    2. Mild spinal canal stenosis at T8 secondary to 5 mm of retropulsion of the   posterior margin of the vertebral body into the spinal canal. 3. Interval development of the small bilateral pleural effusions. - sp T8 kyphoplasty by IR on      Left breast CA   - f/b Dr Cynthia Thompson- current tx on hold- she is going to f/u with oncology as OP    Home Health S4 Level Recommendation:  Level 3 Safety if going home  AM-PAC Score: AM-PAC Inpatient Daily Activity Raw Score: 15    Preadmission Environment    Pt. Lives Alone (has 3 sons, they visit about every other day)  Home environment:    one story home (Able to Live on Main level with bedroom/bathroom)  Steps to enter first floor: 4 steps to enter and hand rail unilateral (left side)  Steps to second floor: N/A  Bathroom: tub/shower unit, comfort height toilet and grab bars around shower and around toilet, had shower chair but doesn't use  Equipment owned: , [facility], [facility]     Preadmission Status:  Pt. Able to drive: No  Pt Fully independent with ADLs: No  Pt. Required assistance from family for: Cleaning and Laundry, Meal prep Stand by (family brings meals, has cleaning lady 1x/week)  Pt. independent for transfers and gait and walked with No Device  History of falls Yes 1 fall, lost balance while reaching forward and was able to get up from ground with increased time. Pain  Yes  Ratin  Location:surgical site at rest, spasm around bottom of rib cage with activity  Pain Medicine Status: RN notified      Cognition    A&O x4   Able to follow 1 step commands    Subjective  Patient lying supine in bed with family at bedside. (son)  Pt agreeable to this OT eval & tx. Upper Extremity ROM:    WFL,  pt able to perform all bed mobility, transfers, and gait without ROM limitation.     Upper Extremity Strength:    BUE strength WFL, but not formally assessed w/ MMT      Upper Extremity Sensation    WFL    Upper Extremity Proprioception:  WFL    Coordination and Tone  WFL    Balance  Functional Sitting Balance:  WFL  Functional Standing Balance:Impaired - CGA with RW    Bed mobility:    Supine to sit: CGA with cues for logroll technique  Sit to supine:   CGA  Rolling:    SBA  Scooting in sitting:  SBA  Scooting to head of bed:   Not Tested    Bridging:   Not Tested    Transfers:    Sit to stand:  CGA with cues for hand placement  Stand to sit:  CGA with cues for hand placement  Bed to chair:   CGA and with RW  Standard toilet: Min A with RW, cues for hand placement  Bed to [facility]:  Not Tested    Dressing:      UE:   Not Tested  LE:    Mod A don pullup, patient verbalized understanding of use of reacher but did not demo this session    Bathing:    UE:  Not Tested  LE:  Not Tested    Eating:   Not Tested    Toileting: Mod A for pericare    Activity Tolerance   Pt completed therapy session with Dizziness noted with functional mobility after BM on toilet  Pain noted with all activity and at rest.  RN notified.       SpO2  HR BP   Supine 92% 88 177/77   Sitting after to/from bathroom 88%, improved to 94% with deep breath/rest x 2 min 92 131/60          Positioning Needs:   Pt up in chair, alarm set, positioned in proper neutral alignment and pressure relief provided. Call light provided and all needs within reach   RN addressing alarm box which appears to be malfunctioning. Exercise / Activities Initiated: NA  N/A    Patient/Family Education:   Role of OT  Recommendations for DC  Energy conservation techniques  Safe RW use/hand placement    Assessment of Deficits: Pt seen for Occupational therapy evaluation in acute care setting. Pt demonstrated decreased Activity tolerance, ADLs, IADLs, Bed mobility, Safety Awareness, Strength and Transfers. Pt functioning below baseline and will likely benefit from skilled occupational therapy services to maximize safety and independence. Goal(s) : To be met in 3 Visits:  1). Bed to toilet/BSC: Modified Independent    To be met in 5 Visits:  1). Supine to/from Sit:  Independent  2). Upper Body Bathing:   SBA  3). Lower Body Bathing:   SBA  4).  Upper Body Dressing: SBA  5). Lower Body Dressing:  SBA  6). Pt to demonstrate UE exs x 15 reps with minimal cues    Rehabilitation Potential:  Good for goals listed above. Strengths for achieving goals include: Pt motivated, PLOF, Family Support and Pt cooperative  Barriers to achieving goals include:  Complexity of condition and Pain     Plan: To be seen 3-5 x/wk while in acute care setting for therapeutic exercises, bed mobility, transfers, dressing, bathing, family/patient education, ADL/IADL retraining, energy conservation training.      Luis Arguello, OTR/L [id number]            If patient discharges from this facility prior to next visit, this note will serve as the Discharge Summary

## 2021-05-26 NOTE — PROGRESS NOTES
[facility]  INTERVENTIONAL-RADIOLOGY    Patient Progress Note    Name: Zenon Bowman  :  1926  MRN: [id number]  Date: 21      CC: No chief complaint on file. ASSESSMENT/PLAN  Active Problems:  Compression fracture of body of thoracic vertebra (HCC)  Acute midline thoracic back pain  -POD #1  -S/p T8 Kyphoplasty with Dr Cathryn Ramsey  -Doing well  -Working with PT/OT  -Rec's for SNF and patient agreeable  -Pain well controlled, still having some spasms  -IR will sign off, please contact us if further needs arise    Hyponatremia  Anemia of chronic disease  -Na 127  -Hgb 8.5>9.9  -Per Primary       Robyn Manish is an 80 y.o. female POD #1 after T8 Kyphoplasty. She reports that her back pain is greatly improved. She was able to get up and walk to the restroom with the help of a walker. She is still reporting some abdominal spasms, which are also less severe than yesterday. She denies any needs at this time. She is eager to get to SNF and start rehab process.        No orders to display         Vitals:    21 0830   BP: (!) 150/70   Pulse: 78   Resp: 16   Temp: 98 °F (36.7 °C)   SpO2: 94%     Current Facility-Administered Medications: methocarbamol (ROBAXIN) tablet 500 mg, 500 mg, Oral, TID  enoxaparin (LOVENOX) injection 30 mg, 30 mg, Subcutaneous, Daily  dilTIAZem (CARDIZEM CD) extended release capsule 300 mg, 300 mg, Oral, Daily  doxazosin (CARDURA) tablet 2 mg, 2 mg, Oral, Daily  gabapentin (NEURONTIN) capsule 100 mg, 100 mg, Oral, TID  levothyroxine (SYNTHROID) tablet 112 mcg, 112 mcg, Oral, Daily  lidocaine 4 % external patch 1 patch, 1 patch, Transdermal, Daily  metoprolol tartrate (LOPRESSOR) tablet 50 mg, 50 mg, Oral, BID  neomycin-polymyxin-hydrocortisone otic solution 1 drop, 1 drop, Left Ear, 4 times per day  pravastatin (PRAVACHOL) tablet 40 mg, 40 mg, Oral, Daily  rOPINIRole (REQUIP) tablet 0.25 mg, 0.25 mg, Oral, Nightly  sodium chloride flush 0.9 % injection 10 mL, 10 mL, Intravenous, 2 times per day  Vitamin D (CHOLECALCIFEROL) tablet 1,000 Units, 1,000 Units, Oral, Daily  0.9 % sodium chloride infusion, 25 mL, Intravenous, PRN  acetaminophen (TYLENOL) tablet 650 mg, 650 mg, Oral, Q6H PRN **OR** acetaminophen (TYLENOL) suppository 650 mg, 650 mg, Rectal, Q6H PRN  HYDROcodone-acetaminophen (NORCO) 5-325 MG per tablet 1 tablet, 1 tablet, Oral, Q6H PRN  ipratropium-albuterol (DUONEB) nebulizer solution 1 ampule, 1 ampule, Inhalation, Q4H PRN  promethazine (PHENERGAN) tablet 12.5 mg, 12.5 mg, Oral, Q6H PRN **OR** ondansetron (ZOFRAN) injection 4 mg, 4 mg, Intravenous, Q6H PRN  sodium chloride flush 0.9 % injection 10 mL, 10 mL, Intravenous, PRN  calcium carbonate (TUMS) chewable tablet 500 mg, 500 mg, Oral, BID       Physical Exam  Vitals and nursing note reviewed. Constitutional:       Appearance: She is well-developed. She is not diaphoretic. HENT:      Head: Normocephalic and atraumatic. Nose: Nose normal.   Eyes:      General:         Right eye: No discharge. Left eye: No discharge. Cardiovascular:      Rate and Rhythm: Normal rate and regular rhythm. Heart sounds: Normal heart sounds. No murmur heard. No friction rub. No gallop. Pulmonary:      Effort: Pulmonary effort is normal. No respiratory distress. Breath sounds: Normal breath sounds. No wheezing or rales. Abdominal:      General: Abdomen is flat. Bowel sounds are normal.      Palpations: Abdomen is soft. Musculoskeletal:         General: Tenderness (To T8 area, consistent with recent procedure. Pain improved. ) present. Normal range of motion. Cervical back: Normal range of motion and neck supple. Right lower leg: No edema. Left lower leg: No edema. Skin:     General: Skin is warm and dry. Neurological:      Mental Status: She is alert and oriented to person, place, and time. Mental status is at baseline. Cranial Nerves: No cranial nerve deficit. Psychiatric:         Mood and Affect: Mood normal.         Behavior: Behavior normal.         Thought Content:  Thought content normal.           PEARL BEGUM PA-C  05/26/21 12:27 PM

## 2021-05-26 NOTE — PROGRESS NOTES
Progress Note    Admit Date:  5/25/2021    Compression fracture s.p T8 kyphoplasty     Subjective:  Ms. Ann Merrill reports feeling sore from the procedure yesterday. No numbness or tingling to LEs. Has ambulated to the bathroom once - this was yesterday. Denies any CP, SOB. Denies issues with BMs or urination. Objective:   Patient Vitals for the past 4 hrs:   BP Temp Temp src Pulse Resp SpO2 Weight   05/26/21 0438 (!) 157/73 98.3 °F (36.8 °C) Oral 75 16 91 % 152 lb 3.2 oz (69 kg)            Intake/Output Summary (Last 24 hours) at 5/26/2021 0836  Last data filed at 5/26/2021 0438  Gross per 24 hour   Intake 360 ml   Output 500 ml   Net -140 ml       Physical Exam:  Gen: Elderly female, mildly [place]. No distress. Alert. Eyes: No sclera icterus. No conjunctival injection. Neck: Trachea midline. Resp: No accessory muscle use. No crackles. No wheezes. No rhonchi. On RA  CV: Regular rate. Regular rhythm. 3/6 systolic murmur in aortic region. No rub. No edema. GI: Soft, Non-tender. Non-distended. Normal bowel sounds. Skin: Warm and dry. Dressing in place to along mid thoracic spine. M/S: No cyanosis. No joint deformity. No clubbing. Neuro: Awake. Grossly nonfocal    Strength and sensation intact in BLE   Psych: Oriented x 3. No anxiety or agitation. Indu Art MD have reviewed the chart on [facility] and personally interviewed and examined patient, reviewed the data (labs and imaging) and after discussion with my PA formulated the plan. Agree with note with the following edits. HPI:     I reviewed the patient's Past Medical History, Past Surgical History, Medications, and Allergies. Elderly female up in bed,   Pain controlled, had BM     General:  Awake, alert and oriented.  Appears to be not in any distress  Mucous Membranes:  Pink , anicteric  Neck: No JVD, no carotid bruit, no thyromegaly  Chest:  Clear to auscultation bilaterally, no added sounds  Cardiovascular:  RRR S1S2 heard, no murmurs or gallops  Abdomen:  Soft, undistended, non tender, no organomegaly, BS present  Posterior thoracic spine dressing dry   Extremities: No edema or cyanosis. Distal pulses well felt  Neurological : grossly normal              Scheduled Meds:   dilTIAZem  300 mg Oral Daily    doxazosin  2 mg Oral Daily    gabapentin  100 mg Oral TID    levothyroxine  112 mcg Oral Daily    lidocaine  1 patch Transdermal Daily    methocarbamol  500 mg Oral 4x Daily    metoprolol tartrate  50 mg Oral BID    neomycin-polymyxin-hydrocortisone  1 drop Left Ear 4 times per day    pravastatin  40 mg Oral Daily    rOPINIRole  0.25 mg Oral Nightly    sodium chloride flush  10 mL Intravenous 2 times per day    Vitamin D  1,000 Units Oral Daily    calcium carbonate  500 mg Oral BID       Continuous Infusions:   sodium chloride         PRN Meds:  sodium chloride, acetaminophen **OR** acetaminophen, HYDROcodone 5 mg - acetaminophen, ipratropium-albuterol, labetalol, promethazine **OR** ondansetron, sodium chloride flush      Data:  CBC:   Recent Labs     05/24/21  0508 05/25/21  0511 05/26/21  0448   WBC 4.6 3.8* 5.7   HGB 8.3* 8.5* 9.9*   HCT 24.0* 24.8* 29.2*   MCV 94.6 94.4 96.0    213 243     BMP:   Recent Labs     05/24/21  0509 05/25/21  0511 05/26/21  0448   * 135* 127*   K 5.1 4.8 5.0   CL 98* 99 95*   CO2 28 28 26   BUN 13 14 16   CREATININE 1.0 1.1 1.0     CULTURES  None     RADIOLOGY  None    Assessment/Plan:    Acute T8 compression fracture  - sp T8 kyphoplasty by IR on 5/25  Pain controlled with norco, lidocaine patch and robaxin     - PT/OT consult pending   - pain control - PRN Norco  - CM consult for possible SNF placement   -  Pt has diagnosis of osteoporosis and she is already on fosamax at home - continue. Cont home Calcium and Vit D supplementation     Hyponatremia  - 130 > 135 > 127  - will continue to monitor for now     Severe aortic stenosis  - f/b Dr. Andrés Ashley.     - Medical management only     Left breast CA   - f/b Dr Arthur Wolf - current tx on hold  - she is going to f/u with oncology as OP     HTN  - uncontrolled  - cont Cardizem, Cardura, lopressor; PRN Labetalol    Anemia   - Hb stable in the 8s since initialy hospitalization at [facility] in early May  - Iron studies at [facility] consistent with ACD     Hypothyroidism  - home dose of synthroid is 112 mcg daily- continued      CKD 3  - stable     DVT Prophylaxis: SCDs  Diet: DIET GENERAL;  Code Status: DNR-CC     dispo: PT/OT [facility] martin for discharge planning    MERA Bowers PA-C 8:46 AM 5/26/2021     Agree with above  Changes made to note  Change to renal diet  Pt stable for dc  Change scd to mazin Rivera MD, MD 5/26/2021 9:55 AM

## 2021-05-26 NOTE — PROGRESS NOTES
Awake, in bed. Denies pain or discomfort at this time. discussed general plan of care for the day, including plan for physical therapy eval. Pt verbalized understanding. Call light within reach. Bed alarm on.

## 2021-05-26 NOTE — PROGRESS NOTES
Inpatient Physical Therapy Evaluation and Treatment    Unit: PCU  Date:  5/26/2021  Patient Name:    Christa Mansfield  Admitting diagnosis:  Compression fracture of body of thoracic vertebra ([facility] [address] [S22.000A]  Admit Date:  5/25/2021  Precautions/Restrictions/WB Status/ Lines/ Wounds/ Oxygen: Fall risk, Bed/chair alarm, Lines -IV and Telemetry, Spine precautions ( no bending, twisting and lifting)    Treatment Time: 0955 - 1058  Treatment Number:  1   Timed Code Treatment Minutes: 53 minutes  Total Treatment Minutes:  63  minutes    Patient Goals for Therapy: \" Go to Rehab \"          Discharge Recommendations: SNF  DME needs for discharge: defer to facility       Therapy recommendation for EMS Transport: can transport by wheelchair    Therapy recommendations for staff:   Assist of 1 with use of rolling walker (RW) and gait belt for all transfers and ambulation to/from [facility]  to/from chair  to/from bathroom    History of Present Illness: The patient is a 80 y.o. female with recurrent breast cancer on chemotherapy, hypertension, CKD 3, hypothyroidism, severe aortic stenosis who was transferred from [facility] for T8 kyphoplasty. Patient developed acute onset mid back pain after a shaking episode on the commode around 5/16/21. She was taken to [facility] where imaging revealed an acute T8 compression fx. She was admitted to [facility] and evaluated by neuro for the shaking episode- no evidence of seizure, MRI unremarkable- no further work up planned. She was scheduled to undergo kyphoplasty at [facility] but due to schedule constraints she was transferred to [facility] to have procedure completed. She was seen after procedure today, she denies complaints. SHe currently lives at home alone, will need PT OT and CM consult for possible SNF placement. Acute T8 compression fracture:  MRI thoracic spine 5/20/21  Impression   1. Acute/subacute T8 compression fracture demonstrating 70% loss of height.    2. Mild spinal canal stenosis at T8 secondary to 5 mm of retropulsion of the   posterior margin of the vertebral body into the spinal canal.   3. Interval development of the small bilateral pleural effusions. - sp T8 kyphoplasty by IR on     Left breast CA   - f/b Dr Mariza Ramirez- current tx on hold- she is going to f/u with oncology as OP    Home Health S4 Level Recommendation:  Level 3 Safety  AM-PAC Mobility Score    AM-PAC Inpatient Mobility Raw Score : 17       Preadmission Environment    Pt. Lives Alone (patient said that she has 3 sons. patient said that they visit about every other day.)  Home environment:  one story home (Able to Live on Main level with bedroom/bathroom)  Steps to enter first floor: 4 steps to enter and hand rail unilateral (left side)  Steps to second floor: N/A  Bathroom: tub/shower unit, comfort height toilet and grab bars around shower and and around toilet, had shower chair but doesn't use  Equipment owned: , [facility] and [facility]    Preadmission Status:  Pt. Able to drive: No  Pt Fully independent with ADLs: No  Pt. Required assistance from family for: Cleaning and Laundry ,Meal prep Stand by (patient said that she does her own cooking but she has a cleaning lady that visits once a week)  Pt. independent for transfers and gait and walked with No Device  History of falls Yes 1 fall, lost balance while reaching forward and was able to get up from ground with increased time. Pain   Yes  Location: back   Ratin /10  Pain Medicine Status: RN notified    Cognition    A&O x4   Able to follow 2 step commands    Subjective  Patient lying supine in bed with son present. Pt agreeable to this PT eval & tx. Upper Extremity ROM/Strength  Please see OT evaluation.       Lower Extremity ROM / Strength   AROM WFL: Yes  ROM limitations: N/A    Strength Assessment (measured on a 0-5 scale):  R LE   Quad   4   Ant Tib  4   Hamstring 4   Iliopsoas 4  L LE  Quad   4   Ant Tib  4   Hamstring 4   Iliopsoas 4    Lower Extremity Sensation    [facility] Strength as well as decreased independence with Ambulation, Bed Mobility  and Transfers. Recommending SNF upon discharge as patient functioning well below baseline, demonstrates good rehab potential and unable to return home due to limited or no family support, inability to negotiate stairs to enter home/bedroom/bathroom, burden of care beyond caregiver ability, home environment not conducive to patient recovery and limited safety awareness. Goals : To be met in 3 visits:  1). Independent with LE Ex x 10 reps    To be met in 6 visits:  1). Supine to/from sit: Supervision  2). Sit to/from stand: Supervision  3). Bed to chair: Supervision  4). Gait: Ambulate 150 ft.  with  Supervision and use of LRAD (least restrictive assistive device)  5). Tolerate B LE exercises 3 sets of 10-15 reps  6). Ascend/descend 4 steps with SBA with use of hand rail unilateral and LRAD (least restrictive assistive device)    Rehabilitation Potential: Good  Strengths for achieving goals include:   Pt motivated, PLOF, Family Support and Pt cooperative   Barriers to achieving goals include:    Pain, low activity tolerance. Plan    To be seen 3-5 x / week  while in acute care setting for therapeutic exercises, bed mobility, transfers, progressive gait training, balance training, and family/patient education. Signature: Columba Harris MS PT, # [id number]    If patient discharges from this facility prior to next visit, this note will serve as the Discharge Summary.

## 2021-05-27 ENCOUNTER — APPOINTMENT (OUTPATIENT)
Dept: GENERAL RADIOLOGY | Age: 86
DRG: 516 | End: 2021-05-27
Attending: INTERNAL MEDICINE
Payer: MEDICARE

## 2021-05-27 LAB
ANION GAP SERPL CALCULATED.3IONS-SCNC: 6 MMOL/L (ref 3–16)
BUN BLDV-MCNC: 13 MG/DL (ref 7–20)
CALCIUM SERPL-MCNC: 8.3 MG/DL (ref 8.3–10.6)
CHLORIDE BLD-SCNC: 95 MMOL/L (ref 99–110)
CO2: 27 MMOL/L (ref 21–32)
CREAT SERPL-MCNC: 1 MG/DL (ref 0.6–1.2)
GFR AFRICAN AMERICAN: >60
GFR NON-AFRICAN AMERICAN: 51
GLUCOSE BLD-MCNC: 91 MG/DL (ref 70–99)
POTASSIUM REFLEX MAGNESIUM: 4.7 MMOL/L (ref 3.5–5.1)
SARS-COV-2, NAAT: NOT DETECTED
SODIUM BLD-SCNC: 128 MMOL/L (ref 136–145)

## 2021-05-27 PROCEDURE — G0378 HOSPITAL OBSERVATION PER HR: HCPCS

## 2021-05-27 PROCEDURE — 99232 SBSQ HOSP IP/OBS MODERATE 35: CPT | Performed by: INTERNAL MEDICINE

## 2021-05-27 PROCEDURE — 80048 BASIC METABOLIC PNL TOTAL CA: CPT

## 2021-05-27 PROCEDURE — 6370000000 HC RX 637 (ALT 250 FOR IP): Performed by: NURSE PRACTITIONER

## 2021-05-27 PROCEDURE — 2580000003 HC RX 258: Performed by: NURSE PRACTITIONER

## 2021-05-27 PROCEDURE — 6360000002 HC RX W HCPCS: Performed by: INTERNAL MEDICINE

## 2021-05-27 PROCEDURE — 96372 THER/PROPH/DIAG INJ SC/IM: CPT

## 2021-05-27 PROCEDURE — 71045 X-RAY EXAM CHEST 1 VIEW: CPT

## 2021-05-27 PROCEDURE — 87635 SARS-COV-2 COVID-19 AMP PRB: CPT

## 2021-05-27 PROCEDURE — 1200000000 HC SEMI PRIVATE

## 2021-05-27 PROCEDURE — 6370000000 HC RX 637 (ALT 250 FOR IP): Performed by: PHYSICIAN ASSISTANT

## 2021-05-27 PROCEDURE — 96374 THER/PROPH/DIAG INJ IV PUSH: CPT

## 2021-05-27 PROCEDURE — 6370000000 HC RX 637 (ALT 250 FOR IP): Performed by: INTERNAL MEDICINE

## 2021-05-27 PROCEDURE — 36415 COLL VENOUS BLD VENIPUNCTURE: CPT

## 2021-05-27 RX ORDER — FUROSEMIDE 10 MG/ML
20 INJECTION INTRAMUSCULAR; INTRAVENOUS ONCE
Status: COMPLETED | OUTPATIENT
Start: 2021-05-27 | End: 2021-05-27

## 2021-05-27 RX ADMIN — PRAVASTATIN SODIUM 40 MG: 40 TABLET ORAL at 11:11

## 2021-05-27 RX ADMIN — NEOMYCIN SULFATE, POLYMYXIN B SULFATE, HYDROCORTISONE 1 DROP: 3.5; 10000; 1 SOLUTION/ DROPS AURICULAR (OTIC) at 01:08

## 2021-05-27 RX ADMIN — ROPINIROLE HYDROCHLORIDE 0.25 MG: 0.25 TABLET, FILM COATED ORAL at 20:47

## 2021-05-27 RX ADMIN — LEVOTHYROXINE SODIUM 112 MCG: 112 TABLET ORAL at 05:41

## 2021-05-27 RX ADMIN — METHOCARBAMOL TABLETS 500 MG: 500 TABLET, COATED ORAL at 11:12

## 2021-05-27 RX ADMIN — DOXAZOSIN 2 MG: 2 TABLET ORAL at 11:11

## 2021-05-27 RX ADMIN — GABAPENTIN 100 MG: 100 CAPSULE ORAL at 11:12

## 2021-05-27 RX ADMIN — NEOMYCIN SULFATE, POLYMYXIN B SULFATE, HYDROCORTISONE 1 DROP: 3.5; 10000; 1 SOLUTION/ DROPS AURICULAR (OTIC) at 17:19

## 2021-05-27 RX ADMIN — METHOCARBAMOL TABLETS 500 MG: 500 TABLET, COATED ORAL at 20:47

## 2021-05-27 RX ADMIN — ENOXAPARIN SODIUM 30 MG: 100 INJECTION SUBCUTANEOUS at 11:12

## 2021-05-27 RX ADMIN — FUROSEMIDE 20 MG: 10 INJECTION, SOLUTION INTRAMUSCULAR; INTRAVENOUS at 19:37

## 2021-05-27 RX ADMIN — METHOCARBAMOL TABLETS 500 MG: 500 TABLET, COATED ORAL at 15:29

## 2021-05-27 RX ADMIN — METOPROLOL TARTRATE 50 MG: 50 TABLET, FILM COATED ORAL at 11:12

## 2021-05-27 RX ADMIN — CALCIUM CARBONATE (ANTACID) CHEW TAB 500 MG 500 MG: 500 CHEW TAB at 11:11

## 2021-05-27 RX ADMIN — Medication 10 ML: at 11:13

## 2021-05-27 RX ADMIN — NEOMYCIN SULFATE, POLYMYXIN B SULFATE, HYDROCORTISONE 1 DROP: 3.5; 10000; 1 SOLUTION/ DROPS AURICULAR (OTIC) at 23:49

## 2021-05-27 RX ADMIN — NEOMYCIN SULFATE, POLYMYXIN B SULFATE, HYDROCORTISONE 1 DROP: 3.5; 10000; 1 SOLUTION/ DROPS AURICULAR (OTIC) at 05:41

## 2021-05-27 RX ADMIN — Medication 1000 UNITS: at 11:12

## 2021-05-27 RX ADMIN — DILTIAZEM HYDROCHLORIDE 300 MG: 300 CAPSULE, COATED, EXTENDED RELEASE ORAL at 11:13

## 2021-05-27 RX ADMIN — Medication 10 ML: at 20:48

## 2021-05-27 RX ADMIN — GABAPENTIN 100 MG: 100 CAPSULE ORAL at 20:47

## 2021-05-27 RX ADMIN — METOPROLOL TARTRATE 50 MG: 50 TABLET, FILM COATED ORAL at 20:47

## 2021-05-27 RX ADMIN — CALCIUM CARBONATE (ANTACID) CHEW TAB 500 MG 500 MG: 500 CHEW TAB at 20:47

## 2021-05-27 RX ADMIN — NEOMYCIN SULFATE, POLYMYXIN B SULFATE, HYDROCORTISONE 1 DROP: 3.5; 10000; 1 SOLUTION/ DROPS AURICULAR (OTIC) at 11:20

## 2021-05-27 NOTE — PLAN OF CARE
Problem: Falls - Risk of:  Goal: Will remain free from falls  Description: Will remain free from falls  Outcome: Ongoing  Goal: Absence of physical injury  Description: Absence of physical injury  Outcome: Ongoing     Problem: Safety:  Goal: Free from accidental physical injury  Description: Free from accidental physical injury  Outcome: Ongoing  Goal: Free from intentional harm  Description: Free from intentional harm  Outcome: Ongoing

## 2021-05-27 NOTE — FLOWSHEET NOTE
05/27/21 1106   Vitals   Temp 98.6 °F (37 °C)   Temp Source Oral   Pulse 67   Heart Rate Source Monitor   Resp 16   BP (!) 152/60   BP Location Right upper arm   BP Upper/Lower Upper   BP Method Automatic   Patient Position Semi fowlers   Level of Consciousness Alert (0)   MEWS Score 1   Patient Currently in Pain Denies   Oxygen Therapy   SpO2 94 %   O2 Device None (Room air)     Pt in bed. Awake, oriented X4. Declines any pain at this time, repositioned in bed Lidocaine to Left Lumbar Spine. Shift assessment complete. Diminshed lung sounds. RR easy unlabored at rest on RA. Active bowel sounds. BM per pt 05/26. Heart NSR, murmur present on ascultation. Per pt chronic murmur. AM meds one at time w/water. Bed alarm. Will monitor.

## 2021-05-27 NOTE — PROGRESS NOTES
Pt has crackles noted to LL base. Pt spo2 sat low 90's on RA, congested cough persistent, pt not coughing anything up at this time. No recent chest xray, pt encouraged to use IS.  Would you like a portable chest xray    Perfect serve to Teryl Or

## 2021-05-27 NOTE — FLOWSHEET NOTE
05/27/21 1529   Vitals   Temp 97.9 °F (36.6 °C)   Temp Source Oral   Pulse 61   Heart Rate Source Monitor   Resp 16   /65   BP Location Left upper arm   BP Upper/Lower Upper   BP Method Automatic   Patient Position Semi fowlers   Level of Consciousness Alert (0)   MEWS Score 1   Cardiac Rhythm NSR   Oxygen Therapy   SpO2 91 %   O2 Device None (Room air)     Pt encouraged and educated on IS at bedside, learning observed by this RN. Will monitor.

## 2021-05-27 NOTE — PROGRESS NOTES
Progress Note    Admit Date:  5/25/2021    Compression fracture s.p T8 kyphoplasty     Subjective:  Ms. Alma Garzon reports feeling fine today. Pain controlled  Had BM  Worked with PT, planning for rehab      Objective:   No data found. Intake/Output Summary (Last 24 hours) at 5/27/2021 0958  Last data filed at 5/27/2021 0547  Gross per 24 hour   Intake 660 ml   Output 1500 ml   Net -840 ml       Physical Exam:  Gen: Elderly female, mildly [place]. No distress. Alert. Eyes: No sclera icterus. No conjunctival injection. Neck: Trachea midline. Resp: No accessory muscle use. No crackles. No wheezes. No rhonchi. On RA  CV: Regular rate. Regular rhythm. 3/6 systolic murmur in aortic region. No rub. No edema. GI: Soft, Non-tender. Non-distended. Normal bowel sounds. Skin: Warm and dry. Dressing in place to along mid thoracic spine. M/S: No cyanosis. No joint deformity. No clubbing. Neuro: Awake. Grossly nonfocal    Strength and sensation intact in BLE   Psych: Oriented x 3. No anxiety or agitation.                Scheduled Meds:   methocarbamol  500 mg Oral TID    enoxaparin  30 mg Subcutaneous Daily    dilTIAZem  300 mg Oral Daily    doxazosin  2 mg Oral Daily    gabapentin  100 mg Oral TID    levothyroxine  112 mcg Oral Daily    lidocaine  1 patch Transdermal Daily    metoprolol tartrate  50 mg Oral BID    neomycin-polymyxin-hydrocortisone  1 drop Left Ear 4 times per day    pravastatin  40 mg Oral Daily    rOPINIRole  0.25 mg Oral Nightly    sodium chloride flush  10 mL Intravenous 2 times per day    Vitamin D  1,000 Units Oral Daily    calcium carbonate  500 mg Oral BID       Continuous Infusions:   sodium chloride         PRN Meds:  sodium chloride, acetaminophen **OR** acetaminophen, HYDROcodone 5 mg - acetaminophen, ipratropium-albuterol, promethazine **OR** ondansetron, sodium chloride flush      Data:  CBC:   Recent Labs     05/25/21  0511 05/26/21  0448   WBC 3.8* 5.7   HGB 8.5* 9.9* HCT 24.8* 29.2*   MCV 94.4 96.0    243     BMP:   Recent Labs     05/25/21  0511 05/26/21  0448 05/27/21  0415   * 127* 128*   K 4.8 5.0 4.7   CL 99 95* 95*   CO2 28 26 27   BUN 14 16 13   CREATININE 1.1 1.0 1.0     CULTURES  None     RADIOLOGY  None    Assessment/Plan:    Acute T8 compression fracture  - sp T8 kyphoplasty by IR on 5/25  Pain controlled with norco, lidocaine patch and robaxin     - PT/OT consulted  - pain control - PRN Norco  - CM consult for possible SNF placement   -  Pt has diagnosis of osteoporosis and she is already on fosamax at home - continue. Cont home Calcium and Vit D supplementation     Hyponatremia  - 130 > 135 > 128  - will continue to monitor for now     Severe aortic stenosis  - f/b Dr. Ezekiel Banda.     - no symptoms  - Medical management only     Left breast CA   - f/b Dr Maranda Spain - current tx on hold  - she is going to f/u with oncology as OP     HTN  - uncontrolled  - cont Cardizem, Cardura, lopressor; PRN Labetalol    Anemia   - Hb stable in the 8s since initialy hospitalization at [facility] in early May  - Iron studies at [facility] consistent with ACD     Hypothyroidism  - home dose of synthroid is 112 mcg daily- continued      CKD 3  - stable     DVT Prophylaxis: SCDs  Diet: DIET RENAL;  Code Status: DNR-CC     dispo: PT/OT [facility] ED[facility] martin for discharge planning        Marcos Zepeda MD, MD 5/27/2021 9:58 AM

## 2021-05-28 VITALS
WEIGHT: 154.5 LBS | HEIGHT: 62 IN | OXYGEN SATURATION: 91 % | TEMPERATURE: 98.5 F | SYSTOLIC BLOOD PRESSURE: 109 MMHG | HEART RATE: 60 BPM | DIASTOLIC BLOOD PRESSURE: 57 MMHG | BODY MASS INDEX: 28.43 KG/M2 | RESPIRATION RATE: 16 BRPM

## 2021-05-28 LAB
ANION GAP SERPL CALCULATED.3IONS-SCNC: 6 MMOL/L (ref 3–16)
BUN BLDV-MCNC: 12 MG/DL (ref 7–20)
CALCIUM SERPL-MCNC: 8.8 MG/DL (ref 8.3–10.6)
CHLORIDE BLD-SCNC: 95 MMOL/L (ref 99–110)
CO2: 29 MMOL/L (ref 21–32)
CREAT SERPL-MCNC: 1.1 MG/DL (ref 0.6–1.2)
GFR AFRICAN AMERICAN: 56
GFR NON-AFRICAN AMERICAN: 46
GLUCOSE BLD-MCNC: 120 MG/DL (ref 70–99)
GLUCOSE BLD-MCNC: 136 MG/DL (ref 70–99)
GLUCOSE BLD-MCNC: 85 MG/DL (ref 70–99)
GLUCOSE BLD-MCNC: 86 MG/DL (ref 70–99)
PERFORMED ON: ABNORMAL
PERFORMED ON: ABNORMAL
PERFORMED ON: NORMAL
POTASSIUM REFLEX MAGNESIUM: 4.4 MMOL/L (ref 3.5–5.1)
SODIUM BLD-SCNC: 130 MMOL/L (ref 136–145)

## 2021-05-28 PROCEDURE — 2580000003 HC RX 258: Performed by: NURSE PRACTITIONER

## 2021-05-28 PROCEDURE — 6370000000 HC RX 637 (ALT 250 FOR IP): Performed by: NURSE PRACTITIONER

## 2021-05-28 PROCEDURE — G0378 HOSPITAL OBSERVATION PER HR: HCPCS

## 2021-05-28 PROCEDURE — 36415 COLL VENOUS BLD VENIPUNCTURE: CPT

## 2021-05-28 PROCEDURE — 6370000000 HC RX 637 (ALT 250 FOR IP): Performed by: PHYSICIAN ASSISTANT

## 2021-05-28 PROCEDURE — 96372 THER/PROPH/DIAG INJ SC/IM: CPT

## 2021-05-28 PROCEDURE — 97535 SELF CARE MNGMENT TRAINING: CPT

## 2021-05-28 PROCEDURE — 80048 BASIC METABOLIC PNL TOTAL CA: CPT

## 2021-05-28 PROCEDURE — 97110 THERAPEUTIC EXERCISES: CPT

## 2021-05-28 PROCEDURE — 99238 HOSP IP/OBS DSCHRG MGMT 30/<: CPT | Performed by: INTERNAL MEDICINE

## 2021-05-28 PROCEDURE — 97530 THERAPEUTIC ACTIVITIES: CPT

## 2021-05-28 PROCEDURE — 6370000000 HC RX 637 (ALT 250 FOR IP): Performed by: INTERNAL MEDICINE

## 2021-05-28 PROCEDURE — 6360000002 HC RX W HCPCS: Performed by: INTERNAL MEDICINE

## 2021-05-28 RX ORDER — HYDROCODONE BITARTRATE AND ACETAMINOPHEN 5; 325 MG/1; MG/1
1 TABLET ORAL EVERY 6 HOURS PRN
Qty: 6 TABLET | Refills: 0 | Status: SHIPPED | OUTPATIENT
Start: 2021-05-28 | End: 2021-05-31

## 2021-05-28 RX ORDER — LIDOCAINE 4 G/G
1 PATCH TOPICAL DAILY
Qty: 30 PATCH | Refills: 0
Start: 2021-05-28

## 2021-05-28 RX ADMIN — NEOMYCIN SULFATE, POLYMYXIN B SULFATE, HYDROCORTISONE 1 DROP: 3.5; 10000; 1 SOLUTION/ DROPS AURICULAR (OTIC) at 11:13

## 2021-05-28 RX ADMIN — LEVOTHYROXINE SODIUM 112 MCG: 112 TABLET ORAL at 06:34

## 2021-05-28 RX ADMIN — PRAVASTATIN SODIUM 40 MG: 40 TABLET ORAL at 11:10

## 2021-05-28 RX ADMIN — DILTIAZEM HYDROCHLORIDE 300 MG: 300 CAPSULE, COATED, EXTENDED RELEASE ORAL at 11:09

## 2021-05-28 RX ADMIN — NEOMYCIN SULFATE, POLYMYXIN B SULFATE, HYDROCORTISONE 1 DROP: 3.5; 10000; 1 SOLUTION/ DROPS AURICULAR (OTIC) at 06:34

## 2021-05-28 RX ADMIN — ENOXAPARIN SODIUM 30 MG: 100 INJECTION SUBCUTANEOUS at 11:08

## 2021-05-28 RX ADMIN — DOXAZOSIN 2 MG: 2 TABLET ORAL at 11:10

## 2021-05-28 RX ADMIN — METOPROLOL TARTRATE 50 MG: 50 TABLET, FILM COATED ORAL at 11:10

## 2021-05-28 RX ADMIN — METHOCARBAMOL TABLETS 500 MG: 500 TABLET, COATED ORAL at 11:11

## 2021-05-28 RX ADMIN — Medication 10 ML: at 11:13

## 2021-05-28 RX ADMIN — CALCIUM CARBONATE (ANTACID) CHEW TAB 500 MG 500 MG: 500 CHEW TAB at 11:10

## 2021-05-28 RX ADMIN — METHOCARBAMOL TABLETS 500 MG: 500 TABLET, COATED ORAL at 15:38

## 2021-05-28 RX ADMIN — Medication 1000 UNITS: at 11:10

## 2021-05-28 ASSESSMENT — PAIN SCALES - GENERAL: PAINLEVEL_OUTOF10: 0

## 2021-05-28 NOTE — PROGRESS NOTES
Shift assessment complete, patient resting with no complaints of pain. Warm blanket provided for comfort. Will continue to monitor.   Yojana Person RN

## 2021-05-28 NOTE — PROGRESS NOTES
Occupational Therapy Daily Treatment Note    Unit: PCU  Date:  5/28/2021  Patient Name:    Eloy Martinez  Admitting diagnosis:  Compression fracture of body of thoracic vertebra ([facility] [address] [S22.000A]  Admit Date:  5/25/2021  Precautions/Restrictions:  Fall risk, Bed/chair alarm and Lines -IV, kyphoplasty T8 5/25/21        Discharge Recommendations: SNF  DME needs for discharge: defer to facility       Therapy recommendations for staff:   Assist of 1 with use of rolling walker (RW) and gait belt for all transfers and ambulation to/from chair  to/from bathroom  within room    AM-PAC Score: AM-PAC Inpatient Daily Activity Raw Score: [address] S4 Level: Level 3- Safety if going home (will also need 24 hour assist)       Treatment Time:  4603-7499  Treatment number:  2    Timed code treatment minutes: 38 minutes  Total treatment minutes:   38 minutes    History of Present Illness: [facility] TESFAYE H&P 5/25/21:  \"The patient is a 80 y. o. female with recurrent breast cancer on chemotherapy, hypertension, CKD 3, hypothyroidism, severe aortic stenosis who was transferred from [facility] for T8 kyphoplasty.  Patient developed acute onset mid back pain after a shaking episode on the commode around 5/16/21.  She was taken to [facility] where imaging revealed an acute T8 compression fx.  She was admitted to [facility] and evaluated by neuro for the shaking episode- no evidence of seizure, MRI unremarkable- no further work up planned. Charlene Holder was scheduled to undergo kyphoplasty at [facility] but due to schedule constraints she was transferred to [facility] to have procedure completed. Charlene Holder was seen after procedure today, she denies complaints.  SHe currently lives at home alone, will need PT OT and CM consult for possible SNF placement. \"      Acute T8 compression fracture:  MRI thoracic spine 5/20/21  Impression   1. Acute/subacute T8 compression fracture demonstrating 70% loss of height.    2. Mild spinal canal stenosis at T8 secondary to 5 mm of retropulsion of the   posterior margin of the vertebral body into the spinal canal.   3. Interval development of the small bilateral pleural effusions. - sp T8 kyphoplasty by IR on 5/25     Left breast CA   - f/b Dr Ted Cervantes- current tx on hold- she is going to f/u with oncology as OP    Subjective:  Patient in bed and agreeable to therapy. Pain   Yes  Rating: mild  Location:bottom of rib cage/abdomen  Pain Medicine Status: No request made  - provided with pillow to provide counterpressure to abdomen, patient reports this helps with mild pain    Bed Mobility:   Supine to Sit:  CGA  Sit to Supine:  Not Tested  Rolling:           Not Tested  Scooting:        SBA    Transfer Training:   Sit to stand:   CGA with cues for hand placement from EOB, MIN A from toilet  Stand to sit:  CGA to chair, MIN A to low toilet with cues for hand placement (grab bar)  Bed to Chair:  CGA and with RW  Bed to [facility]:   Not Tested  Standard toilet:   Min A with RW, initially had BSC over toilet but found it to be too high, able to move bowels once BSC removed and patient able to have feet on floor on low toilet    Activity Tolerance   Pt completed therapy session with Pain noted with extended stance at window    ADL Training:   Upper body dressing: Not Tested  Upper body bathing:  Not Tested  Lower body dressing:  Max A don pullup   Lower body bathing:  Not Tested  Toileting: Max A cleanse periarea after BM, patient using BUEs on RW  Grooming/Hygiene:  Not Tested    Therapeutic Exercise: all completed bilaterally unless indicated  Hand clasped and punching up toward ceiling to address digit/wrist/elbow/shoulder flex/ext x10, fatigued at end of 10 reps    Patient Education:   Role of OT  Recommendations for DC  Energy conservation techniques  Safe RW use/hand placement  Importance of OOB/activity    Positioning Needs:   Pt up in chair, alarm set, positioned in proper neutral alignment and pressure relief provided.    Call light provided and all needs within reach    Family Present:  Yes, Dtr    Assessment: Making progress toward goals, continue as tolerated. GOALS  To be met in 3 Visits:  1). Bed to toilet/BSC: Modified Independent     To be met in 5 Visits:  1). Supine to/from Sit:             Independent  2). Upper Body Bathing:         SBA  3). Lower Body Bathing:         SBA  4). Upper Body Dressing:       SBA  5). Lower Body Dressing:       SBA  6).  Pt to demonstrate UE exs x 15 reps with minimal cues    Plan: cont with [address], OTR/L 8498        If patient discharges from this facility prior to next visit, this note will serve as the Discharge Summary

## 2021-05-28 NOTE — FLOWSHEET NOTE
05/28/21 1106   Vitals   Temp 98.2 °F (36.8 °C)   Temp Source Oral   Pulse 60   Heart Rate Source Monitor   Resp 16   /65   BP Location Right upper arm   BP Upper/Lower Upper   Patient Position Semi fowlers   Level of Consciousness Alert (0)   MEWS Score 1   Patient Currently in Pain Denies   Oxygen Therapy   SpO2 93 %   O2 Device None (Room air)     Pt in bed. Awake alert oriented X4. Pt declines pain, states abdominal gas-  aware at rounding. Shift assessment complete. Lungs diminished bilateral, RR easy unlabored. Heart chronic murmur. AM meds whole w/water. Call light in reach. Family at bedside. Will monitor.

## 2021-05-28 NOTE — PROGRESS NOTES
Progress Note    Admit Date:  5/25/2021    Compression fracture s.p T8 kyphoplasty     Had lasix yesterday for crackles in lung, remains on RA    Subjective:  Ms. Caterina Ramos reports feeling fine today. Some gas pains in stomach but had BM   Pain controlled  Remains on RA   Worked with PT, planning for rehab      Objective:   Patient Vitals for the past 4 hrs:   BP Temp Temp src Pulse Resp SpO2 Weight   05/28/21 0356 131/63 97.9 °F (36.6 °C) Oral 61 16 92 % 154 lb 8 oz (70.1 kg)            Intake/Output Summary (Last 24 hours) at 5/28/2021 0732  Last data filed at 5/28/2021 0636  Gross per 24 hour   Intake 600 ml   Output 2700 ml   Net -2100 ml       Physical Exam:  Gen: Elderly female, mildly [place]. No distress. Alert. Eyes: No sclera icterus. No conjunctival injection. Neck: Trachea midline. Resp: No accessory muscle use. No crackles. No wheezes. No rhonchi. On RA  CV: Regular rate. Regular rhythm. 3/6 systolic murmur in aortic region. No rub. No edema. GI: Soft, Non-tender. Non-distended. Normal bowel sounds. Skin: Warm and dry. Dressing in place to along mid thoracic spine. M/S: No cyanosis. No joint deformity. No clubbing. Neuro: Awake. Grossly nonfocal    Strength and sensation intact in BLE   Psych: Oriented x 3. No anxiety or agitation.                Scheduled Meds:   methocarbamol  500 mg Oral TID    enoxaparin  30 mg Subcutaneous Daily    dilTIAZem  300 mg Oral Daily    doxazosin  2 mg Oral Daily    gabapentin  100 mg Oral TID    levothyroxine  112 mcg Oral Daily    lidocaine  1 patch Transdermal Daily    metoprolol tartrate  50 mg Oral BID    neomycin-polymyxin-hydrocortisone  1 drop Left Ear 4 times per day    pravastatin  40 mg Oral Daily    rOPINIRole  0.25 mg Oral Nightly    sodium chloride flush  10 mL Intravenous 2 times per day    Vitamin D  1,000 Units Oral Daily    calcium carbonate  500 mg Oral BID       Continuous Infusions:   sodium chloride         PRN Meds:  sodium chloride, acetaminophen **OR** acetaminophen, HYDROcodone 5 mg - acetaminophen, ipratropium-albuterol, promethazine **OR** ondansetron, sodium chloride flush      Data:  CBC:   Recent Labs     05/26/21  0448   WBC 5.7   HGB 9.9*   HCT 29.2*   MCV 96.0        BMP:   Recent Labs     05/26/21  0448 05/27/21  0415 05/28/21  0427   * 128* 130*   K 5.0 4.7 4.4   CL 95* 95* 95*   CO2 26 27 29   BUN 16 13 12   CREATININE 1.0 1.0 1.1     CULTURES  None     RADIOLOGY  None    Assessment/Plan:    Acute T8 compression fracture  - sp T8 kyphoplasty by IR on 5/25  Pain controlled with norco, lidocaine patch and robaxin     - PT/OT consulted  - pain control - PRN Norco  - CM consult for possible SNF placement   -  Pt has diagnosis of osteoporosis and she is already on fosamax at home - continue. Cont home Calcium and Vit D supplementation     Hyponatremia  - 130 > 135 > 130  - will continue to monitor for now     Severe aortic stenosis  - f/b Dr. Bijan Carbajal.     - no symptoms  - Medical management only     Left breast CA   - f/b Dr Sherwin Santiago - current tx on hold  - she is going to f/u with oncology as OP     HTN  - uncontrolled  - cont Cardizem, Cardura, lopressor; PRN Labetalol    Anemia   - Hb stable in the 8s since initialy hospitalization at [facility] in early May  - Iron studies at [facility] consistent with ACD     Hypothyroidism  - home dose of synthroid is 112 mcg daily- continued      CKD 3  - stable     DVT Prophylaxis: SCDs  Diet: DIET GENERAL;  Code Status: DNR-CC     dispo: PT/OT LUIS EDUARDO martin for discharge planning        Trenton Mcrae MD, MD 5/28/2021 7:40 AM

## 2021-05-28 NOTE — DISCHARGE SUMMARY
Name:  Dominick Cavazos  Room:  /6130-36  MRN:    [id number]    Discharge Summary      This discharge summary is in conjunction with a complete physical exam done on the day of discharge. Discharging Physician: Dr. Shaun Nelson: 5/25/2021  Discharge:  5/28/2021    HPI taken from admission H&P:      The patient is a 80 y.o. female with recurrent breast cancer on chemotherapy, hypertension, CKD 3, hypothyroidism, severe aortic stenosis who was transferred from [facility] for T8 kyphoplasty. Patient developed acute onset mid back pain after a shaking episode on the commode around 5/16/21. She was taken to [facility] where imaging revealed an acute T8 compression fx. She was admitted to [facility] and evaluated by neuro for the shaking episode- no evidence of seizure, MRI unremarkable- no further work up planned. She was scheduled to undergo kyphoplasty at [facility] but due to schedule constraints she was transferred to [facility] to have procedure completed. She was seen after procedure today, she denies complaints. SHe currently lives at home alone, will need PT OT and CM consult for possible SNF placement.        Diagnoses this Admission and Hospital Course     Acute T8 compression fracture  - s/p T8 kyphoplasty by IR on 5/25  - Pain controlled with norco, lidocaine patch and robaxin   - PT/OT consulted  - pain control - PRN Norco  - CM consulted for possible SNF placement   -  Pt has diagnosis of osteoporosis and she is already on fosamax at home - continued.  Continued home Calcium and Vit D supplementation   - discharged with lidocaine patch     Hyponatremia  - 130 > 135 > 130  - monitored     Severe aortic stenosis  - f/b Dr. Dana Gomez.    - no symptoms  - Medical management only     Left breast CA   - f/b Dr Ken Hollingsworth - current tx on hold  - she is going to f/u with oncology as OP     HTN  - uncontrolled > improved  - continued Cardizem, Cardura, lopressor; PRN Labetalol     Anemia   - Hb stable in the 8s since initialy drug: alendronate     furosemide 20 MG tablet  Commonly known as: LASIX     gabapentin 100 MG capsule  Commonly known as: NEURONTIN  Take 1 capsule by mouth 3 times daily for 3 days. HYDROcodone-acetaminophen 5-325 MG per tablet  Commonly known as: NORCO  Take 1 tablet by mouth every 6 hours as needed for Pain for up to 3 days. ipratropium-albuterol 0.5-2.5 (3) MG/3ML Soln nebulizer solution  Commonly known as: DUONEB  Inhale 3 mLs into the lungs every 4 hours as needed for Shortness of Breath or Other (wheezing)     levothyroxine 112 MCG tablet  Commonly known as: SYNTHROID     methocarbamol 500 MG tablet  Commonly known as: ROBAXIN  Take 1 tablet by mouth 4 times daily for 10 days     metoprolol tartrate 25 MG tablet  Commonly known as: LOPRESSOR     pravastatin 40 MG tablet  Commonly known as: PRAVACHOL     PRESERVISION AREDS 2 PO     rOPINIRole 0.25 MG tablet  Commonly known as: REQUIP     terazosin 2 MG capsule  Commonly known as: HYTRIN     Vitamin B-12 2500 MCG Subl     VITAMIN D PO           Where to Get Your Medications      You can get these medications from any pharmacy    Bring a paper prescription for each of these medications  · HYDROcodone-acetaminophen 5-325 MG per tablet     Information about where to get these medications is not yet available    Ask your nurse or doctor about these medications  · lidocaine 4 % external patch           Discharged in stable condition to SNF. Follow Up: Follow up with physician at SNF.       Cornell Gross MD, 6/28/2021 9:42 AM

## 2021-05-28 NOTE — PROGRESS NOTES
Patient educated on discharge instructions as well as new medications use, dosage, administration and possible side effects. Patient verified knowledge. IV removed without difficulty and dry dressing in place. Telemetry monitor removed and returned to [facility]. Pt left facility in stable condition to Rehab with all of their personal belongings.    Report called to

## 2021-05-28 NOTE — PROGRESS NOTES
Pt is lying in bed with their eyes closed. Respirations are easy and even. Call light within reach bed in lowest position with the wheels locked. Will continue to monitor.  Ar Rasmussen RN

## 2021-05-28 NOTE — CARE COORDINATION
0947 LVM for Brianna @ [facility] to inquire status of precert. 1410 LVM for Brianna @ [facility] to inquire status of precert. 1415 TC to [facility] @ [place] [phone] and she states precert is still pending and they will have a determination today. INTERDISCIPLINARY PLAN OF CARE CONFERENCE    Date/Time: 5/28/2021 4:12 PM  Completed by: Mazin Brumfield RN, Case Management      Patient Name:  Rosa Elena Banegas  YOB: 1926  Admitting Diagnosis: Compression fracture of body of thoracic vertebra ([address] [R43.168E]     Admit Date/Time:  5/25/2021 10:34 AM    Chart reviewed. Interdisciplinary team contacted or reviewed plan related to patient progress and discharge plans. Disciplines included Case Management, Nursing, and Dietitian. Current Status:PCU-waiting on precert  PT/OT recommendation for discharge plan of care: SNF    Expected D/C Disposition:  [facility]  Confirmed plan with patient and/or family Yes confirmed with: (name) pt and dtr   Met with:pt and dtr  Discharge Plan Comments: Chart reviewed. Met with pt and dtr at bedside and plan continues for STR @ [facility]. Per Laurie Butt @ [place] precert is approved, she is aware of  time. Per Laurie Butt pt will go to   [facility] room 208  Report # [phone]  Fax [phone]    Negative COVID on 5/27.     Home O2 in place on admit: ecf  Pt informed of need to bring portable home O2 tank on day of discharge for nursing to connect prior to leaving:  Not Indicated  Verbalized agreement/Understanding:  Not Indicated
INTERDISCIPLINARY PLAN OF CARE CONFERENCE    Date/Time: 5/26/2021 2:26 PM  Completed by: DONATO Patel. Case Management      Patient Name:  Verito Wisdom  YOB: 1926  Admitting Diagnosis: Compression fracture of body of thoracic vertebra ([facility] [address] [Q96.161H]     Admit Date/Time:  5/25/2021 10:34 AM    Chart reviewed. Interdisciplinary team contacted or reviewed plan related to patient progress and discharge plans. Disciplines included Case Management, Nursing, and Dietitian. Current Status: Ongoing   PT/OT recommendation for discharge plan of care: SNF    Expected D/C Disposition:  Skilled nursing facility  Confirmed plan with patient and/or family Yes confirmed with: (name) pt's daughter Theone Ninilchik at bedside  Met with: pt's daughter Theone Ninilchik at bedside    Discharge Plan Comments: Chart review completed. Met with pt and pt's daughter Theone Ninilchik at bedside. Pt was soundly sleeping and did not participate in conversation. Pt's daughter stated that they already discussed SNF's and pt will be agreeable to SNF. Provided daughter with SNF list to review and she requested referrals to 1. [facility] 2. [facility] 3. [facility]. She is aware pt will need insurance authorization. Spoke with Susan Martin at the [facility] and Brittaney at [facility] who stated they do not take pt's insurance. Message left for admissions at [facility] requesting call back. Pt's daughter updated     Home O2 in place on admit: No    Pt will require pre-cert for SNF. CM will continue to follow. Please notify CM if needs or concerns arise.      Addendum at 4:00pm: LEONCIO HOFF spoke with Madyson Lucia, at [facility] and she is reviewing the referral.
follow for potential DC O2 need  Informed of need for care provider to bring portable home O2 tank on day of discharge for nursing to connect prior to leaving:   Not Indicated  Verbalized agreement/Understanding:   Not Indicated    Community Service Affiliation  Dialysis:  No    · Agency:  · Location:  · Dialysis Schedule:  · Phone:   · Fax: Other Community Services: [employer] once a week that family hired. DISCHARGE PLAN: Explained Case Management role/services. Chart review completed. Attempted meeting with pt at bedside but she was sleeping and individuals visiting was not listed as emergency contacts on the facesheet. Called and spoke with pt's son Nell Levin who completed the assessment. He stated pt is normally independent at home and uses no DME. Pt's son Nell Levin stated that pt will likely need SNF on discharge. Explained PT/OT is ordered to work with pt when appropriate and once recommendations are in, a SNF list would be provided to pt and the family. He stated agreement and denied needs or questions for CM at this time. PT/OT pending. CM will continue to follow. Please notify CM if needs or concerns arise.

## 2021-05-28 NOTE — DISCHARGE INSTR - COC
Continuity of Care Form    Patient Name: Marcell Espinosa   :  1926  MRN:  [id number]    Admit date:  2021  Discharge date:  21    Code Status Order: [facility]   Advance Directives:   [address] [facility] Documentation       Date/Time Healthcare Directive Type of Healthcare Directive Copy in [address] Agent's Name Healthcare Agent's Phone Number    21 0050  Yes, patient has an advance directive for healthcare treatment  Durable power of  for health care  No, copy requested from family  Adult Children  --  --            Admitting Physician:  Nikki Martinez MD  PCP: Nevin Michelle MD    Discharging Nurse: Elias Cervantes Unit/Room#: /6172-08  [address] [facility] Unit Phone Number: [phone]    Emergency Contact:   Extended Emergency Contact Information  Primary Emergency Contact: [address] Phone: [phone]  Work Phone: [phone]  Mobile Phone: [phone]  Relation: Child  Secondary Emergency Contact: [address] [place] [address] Phone: [phone]  Relation: Child    Past Surgical History:  Past Surgical History:   Procedure Laterality Date    BLADDER SUSPENSION  2000    BREAST LUMPECTOMY Left 08/21/15    CATARACT REMOVAL      CHOLECYSTECTOMY      COSMETIC SURGERY      HYSTERECTOMY      IR KYPHOPLASTY THORACIC FIRST LEVEL  2021    IR KYPHOPLASTY THORACIC FIRST LEVEL 2021 [address] SPECIAL PROCEDURES       Immunization History: There is no immunization history on file for this patient.     Active Problems:  Patient Active Problem List   Diagnosis Code    Breast cancer of upper-inner quadrant of left female breast ([facility] [address] C50.212    Greater trochanteric bursitis M70.60    Pneumonia J18.9    Compression fracture of thoracic spine, non-traumatic, sequela M48.54XS    Syncope and collapse R55    Compression fracture of body of thoracic vertebra (HCC) S22.000A    Generalized weakness R53.1    HTN (hypertension), benign I10    Acute midline thoracic back pain M54.6    Hyponatremia E87.1    Anemia of chronic disease D63.8       Isolation/Infection:   Isolation            No Isolation          Patient Infection Status       Infection Onset Added Last Indicated Last Indicated By Review Planned Expiration Resolved Resolved By    None active    Resolved    COVID-19 Rule Out 05/27/21 05/27/21 05/27/21 COVID-19, Rapid (Ordered)   05/27/21 Rule-Out Test Resulted    COVID-19 Rule Out 05/13/21 05/13/21 05/13/21 SARS-CoV-2 NAAT (Rapid) (Ordered)   05/13/21 Rule-Out Test Resulted            Nurse Assessment:  Last Vital Signs: /63   Pulse 61   Temp 97.9 °F (36.6 °C) (Oral)   Resp 16   Ht 5' 2\" (1.575 m)   Wt 154 lb 8 oz (70.1 kg)   SpO2 92%   BMI 28.26 kg/m²     Last documented pain score (0-10 scale): Pain Level: 3  Last Weight:   Wt Readings from Last 1 Encounters:   05/28/21 154 lb 8 oz (70.1 kg)     Mental Status:  oriented, alert and able to concentrate and follow conversation    IV Access:  - None    Nursing Mobility/ADLs:  Walking   Assisted  Transfer  Assisted  Bathing  Assisted  Dressing  Assisted  Toileting  Assisted  Feeding  Independent  Med [facility]  Assisted  Med Delivery   whole    Wound Care Documentation and Therapy:  Incision 08/21/15 Chest Left (Active)   Number of days: 2106        Elimination:  Continence:   · Bowel: Yes  · Bladder: Yes  Urinary Catheter: None   Colostomy/Ileostomy/Ileal Conduit: No       Date of Last BM: 05/28/21    Intake/Output Summary (Last 24 hours) at 5/28/2021 1016  Last data filed at 5/28/2021 0636  Gross per 24 hour   Intake 360 ml   Output 2700 ml   Net -2340 ml     I/O last 3 completed shifts: In: 600 [P.O.:600]  Out: 2700 [Urine:2700]    Safety Concerns: At Risk for Falls    Impairments/Disabilities:      T8 Spinal FX    Nutrition Therapy:  Current Nutrition Therapy:   - Oral Diet:  General    Routes of Feeding: Oral  Liquids:  Thin Liquids  Daily Fluid Restriction: no  Last Modified Barium Swallow with Video (Video Swallowing Test): not done    Treatments at the Time of Hospital Discharge:   Respiratory Treatments: Incentive Spirometer   Oxygen Therapy:  is not on home oxygen therapy. Ventilator:    - No ventilator support    Rehab Therapies: Physical Therapy and Occupational Therapy  Weight Bearing Status/Restrictions: No weight bearing restirctions  Other Medical Equipment (for information only, NOT a DME order): Walker Assist X1   Other Treatments:     Patient's personal belongings (please select all that are sent with patient):  Pt belongings sent with patient    RN SIGNATURE:  Electronically signed by Dylon Tubbs RN on 5/28/21 at 4:08 PM EDT    CASE MANAGEMENT/SOCIAL WORK SECTION    Inpatient Status Date: 5/25/2021    Readmission Risk Assessment Score:  Readmission Risk              Risk of Unplanned Readmission:  23           Discharging to Facility/ Agency   · Name: [facility]        Address: [address], [place], [facility]  · Phone: [phone]  · Fax: [phone]    Dialysis Facility (if applicable)   · Name:  · Address:  · Dialysis Schedule:  · Phone:  · Fax:    / signature: Electronically signed by Jade Smith RN on 5/28/21 at 4:20 PM EDT    PHYSICIAN SECTION    Prognosis: Good    Condition at Discharge: Stable    Rehab Potential (if transferring to Rehab): Good    Recommended Labs or Other Treatments After Discharge:  fall precautions, daily stool softners     Physician Certification: I certify the above information and transfer of Charito Guzmán  is necessary for the continuing treatment of the diagnosis listed and that she requires [facility] for less 30 days.      Update Admission H&P: No change in H&P    PHYSICIAN SIGNATURE:  Electronically signed by Cornell Gross MD on 5/28/21 at 10:17 AM EDT

## 2021-05-28 NOTE — PROGRESS NOTES
Nightly medications given without difficulty. Water provided for patient to brush her teeth. No other needs at this time. Call light within reach.

## 2021-05-28 NOTE — FLOWSHEET NOTE
05/28/21 1537   Vitals   Temp 98.5 °F (36.9 °C)   Temp Source Oral   Pulse 60   Heart Rate Source Monitor   Resp 16   BP (!) 109/57   BP Location Right upper arm   BP Upper/Lower Upper   Patient Position Semi fowlers   Level of Consciousness Alert (0)   MEWS Score 1   Patient Currently in Pain Denies   Oxygen Therapy   SpO2 91 %   O2 Device None (Room air)     Pt encouraged to use IS, educated with family at bedside. Per Moran Echevaria pending precert family aware. Will monitor.

## 2023-05-01 NOTE — PROGRESS NOTES
Physical Therapy    Facility/Department: [facility] A2 CARD TELEMETRY  Initial Assessment    NAME: Jolynn Howard  : 1926  MRN: [id number]    Date of Service: 2021    Discharge Recommendations:  24 hour supervision or assist, Home with Home health PT   PT Equipment Recommendations  Equipment Needed:  (no, family reports they own many walkers due to another family  members chronic illness)  If pt discharges prior to next PT session this note will serve as discharge summary. Assessment   Body structures, Functions, Activity limitations: Decreased functional mobility ; Increased pain;Decreased strength;Decreased posture  Assessment: pt is 81 yo female with T8 compression fx. She lives alone and is normally indep in amb and ADLs. Grandaughter present during PT session. pt and family educated in spinal precauitons and basic body mechanics given T8 fx and voiced understnading of instructions. Pt was able to perform bed mob, transfers and ambulation with RW within hospital room with CG. Belle Martínez reports family can provide RW and will take turns providing 24 hr care for pt. Pt will benefit from skilled PT to address deficits. Recommend Home with 24 hr assist and home PT  Treatment Diagnosis: Back pain, decreased mobility  Prognosis: Good  Decision Making: Medium Complexity  PT Education: Goals;Transfer Training;Disease Specific Education;PT Role;Plan of Care;General Safety;Home Exercise Program;Gait Training;Precautions  Patient Education: Pt and grandaughter educated in spinal precautions and basic body mechanics for bed mob/transfers/ADLs given T8 fx. They voice understanding  Barriers to Learning: none  REQUIRES PT FOLLOW UP: Yes  Activity Tolerance  Activity Tolerance: Patient Tolerated treatment well  Activity Tolerance: Pain 3-4/10 at rest, 6/10 with mobility.   /58  HR 62  SpO2  92%       Patient Diagnosis(es): The primary encounter diagnosis was Compression fracture of body of thoracic vertebra [facility]). Diagnoses of Syncope and collapse and Generalized weakness were also pertinent to this visit. has a past medical history of Cancer ([address] Cataract, High blood pressure, and Thyroid disorder. has a past surgical history that includes Hysterectomy; Cholecystectomy; Cataract removal; bladder suspension (2000); and Breast lumpectomy (Left, 08/21/15).     Restrictions  Restrictions/Precautions: Fall Risk, Up as Tolerated, General Precautions  Position Activity Restriction  Spinal Precautions: pt has moderate T8 compression fx with 5mm retropusion per x ray report  Vision/Hearing  Vision: Impaired  Vision Exceptions: Wears glasses for reading  Hearing: Within functional limits     Subjective  Chart Reviewed: Yes  Referring Practitioner: Gerri Colunga  Referral Date : 05/16/21  Diagnosis: T8 compression fx  Pain Screening  Patient Currently in Pain: Yes  Pain Location: mid back  Pain Rating: 3-4/10 at rest, 6/10 with mobility  Pt provided emotional support and repositioning and able to cont with session, pt satisfied  Vital Signs- See activity tolerance section above   Orientation  Overall Orientation Status: Within Functional Limits  Social/Functional History  Social/Functional History  Lives With: Alone (family recently staying with her per case management note)  Type of Home: [address] [place] [address],[address]: One level  Home Access: Stairs to enter with rails (4)  Bathroom Shower/Tub: Tub/Shower unit  Bathroom Toilet: Standard  Bathroom Equipment: Shower chair, Grab bars in shower  Home Equipment:  (can obtain RW from family member per pt report)  Receives Help From: Family  ADL Assistance: Independent  Ambulation Assistance: Independent  Transfer Assistance: Independent    Objective     RLE PROM: WFL  LLE PROM: WFL  Strength RLE: WFL  Strength LLE: WFL        Bed mobility  Rolling to Right: Stand by assistance  Supine to Sit: Contact guard assistance  Sit to Supine: Contact guard assistance  Comment: pt educated in log roll technique to protect spine as she initiates supine to sit, she voices and demonstrates understanding  Transfers  Sit to Stand: Contact guard assistance  Stand to sit: Contact guard assistance  Ambulation  Surface: level tile  Device: Rolling Walker  Assistance: Contact guard assistance  Quality of Gait: slow pace, recipricol pattern, shortened stride  Distance: 26 ft        Exercises  Quad Sets: 10 x B  Gluteal Sets: 10 x B  Ankle Pumps: 15 x B  Diaphragmatic breathing with TA set 10 x     Plan   Times per week: 3-5 x week  Times per day: Daily  Current Treatment Recommendations: Strengthening, Transfer Training, Endurance Training, Gait Training, Functional Mobility Training, Safety Education & Training, Home Exercise Program  Safety Devices  Type of devices:  All fall risk precautions in place, Gait belt, Call light within reach, Left in bed, Patient at risk for falls, Bed alarm in place, Nurse notified    AM-PAC Score  AM-PAC Inpatient Mobility Raw Score : 18 (05/18/21 1102)  AM-PAC Inpatient T-Scale Score : 43.63 (05/18/21 1102)  Mobility Inpatient CMS 0-100% Score: 46.58 (05/18/21 1102)  Mobility Inpatient CMS G-Code Modifier : CK (05/18/21 1102)   Goals  Short term goals  Time Frame for Short term goals: 1 week (5/25) unless otherwise specified  Short term goal 1: pt to perform bed mob modified indep  Short term goal 2: pt to perform trasnfers modified indep  Short term goal 3: pt to amb 120 ft with SBA using RW  Short term goal 4: pt to participate in 12-15 reps LE Ex by 5/21  Patient Goals   Patient goals : \"to know how to move so I protect my back and have less pain\"- MET     Therapy Time   Individual Concurrent Group Co-treatment   Time In 1012         Time Out 1048         Minutes 36         Timed Code Treatment Minutes: Damian 62, PT Myalgia Treatment: I explained this is common when taking isotretinoin. If this worsens they will contact us. They may try OTC ibuprofen.